# Patient Record
Sex: MALE | Race: WHITE | NOT HISPANIC OR LATINO | Employment: OTHER | URBAN - METROPOLITAN AREA
[De-identification: names, ages, dates, MRNs, and addresses within clinical notes are randomized per-mention and may not be internally consistent; named-entity substitution may affect disease eponyms.]

---

## 2023-02-24 LAB
ALANINE AMINOTRANSFERASE (SGPT) (U/L) IN SER/PLAS: 11 U/L (ref 10–52)
ALBUMIN (G/DL) IN SER/PLAS: 4.2 G/DL (ref 3.4–5)
ALKALINE PHOSPHATASE (U/L) IN SER/PLAS: 70 U/L (ref 33–136)
ANION GAP IN SER/PLAS: 10 MMOL/L (ref 10–20)
ASPARTATE AMINOTRANSFERASE (SGOT) (U/L) IN SER/PLAS: 14 U/L (ref 9–39)
BASOPHILS (10*3/UL) IN BLOOD BY AUTOMATED COUNT: 0.06 X10E9/L (ref 0–0.1)
BASOPHILS/100 LEUKOCYTES IN BLOOD BY AUTOMATED COUNT: 0.6 % (ref 0–2)
BILIRUBIN TOTAL (MG/DL) IN SER/PLAS: 0.7 MG/DL (ref 0–1.2)
CALCIUM (MG/DL) IN SER/PLAS: 9.6 MG/DL (ref 8.6–10.3)
CARBON DIOXIDE, TOTAL (MMOL/L) IN SER/PLAS: 30 MMOL/L (ref 21–32)
CHLORIDE (MMOL/L) IN SER/PLAS: 101 MMOL/L (ref 98–107)
CREATININE (MG/DL) IN SER/PLAS: 0.85 MG/DL (ref 0.5–1.3)
EOSINOPHILS (10*3/UL) IN BLOOD BY AUTOMATED COUNT: 0.27 X10E9/L (ref 0–0.4)
EOSINOPHILS/100 LEUKOCYTES IN BLOOD BY AUTOMATED COUNT: 2.6 % (ref 0–6)
ERYTHROCYTE DISTRIBUTION WIDTH (RATIO) BY AUTOMATED COUNT: 13.4 % (ref 11.5–14.5)
ERYTHROCYTE MEAN CORPUSCULAR HEMOGLOBIN CONCENTRATION (G/DL) BY AUTOMATED: 33.8 G/DL (ref 32–36)
ERYTHROCYTE MEAN CORPUSCULAR VOLUME (FL) BY AUTOMATED COUNT: 96 FL (ref 80–100)
ERYTHROCYTES (10*6/UL) IN BLOOD BY AUTOMATED COUNT: 4.79 X10E12/L (ref 4.5–5.9)
GFR MALE: >90 ML/MIN/1.73M2
GLUCOSE (MG/DL) IN SER/PLAS: 91 MG/DL (ref 74–99)
HEMATOCRIT (%) IN BLOOD BY AUTOMATED COUNT: 46.1 % (ref 41–52)
HEMOGLOBIN (G/DL) IN BLOOD: 15.6 G/DL (ref 13.5–17.5)
IMMATURE GRANULOCYTES/100 LEUKOCYTES IN BLOOD BY AUTOMATED COUNT: 0.2 % (ref 0–0.9)
LEUKOCYTES (10*3/UL) IN BLOOD BY AUTOMATED COUNT: 10.4 X10E9/L (ref 4.4–11.3)
LYMPHOCYTES (10*3/UL) IN BLOOD BY AUTOMATED COUNT: 4.11 X10E9/L (ref 0.8–3)
LYMPHOCYTES/100 LEUKOCYTES IN BLOOD BY AUTOMATED COUNT: 39.5 % (ref 13–44)
MONOCYTES (10*3/UL) IN BLOOD BY AUTOMATED COUNT: 1.04 X10E9/L (ref 0.05–0.8)
MONOCYTES/100 LEUKOCYTES IN BLOOD BY AUTOMATED COUNT: 10 % (ref 2–10)
NEUTROPHILS (10*3/UL) IN BLOOD BY AUTOMATED COUNT: 4.9 X10E9/L (ref 1.6–5.5)
NEUTROPHILS/100 LEUKOCYTES IN BLOOD BY AUTOMATED COUNT: 47.1 % (ref 40–80)
PLATELETS (10*3/UL) IN BLOOD AUTOMATED COUNT: 321 X10E9/L (ref 150–450)
POTASSIUM (MMOL/L) IN SER/PLAS: 5 MMOL/L (ref 3.5–5.3)
PROTEIN TOTAL: 7.1 G/DL (ref 6.4–8.2)
SODIUM (MMOL/L) IN SER/PLAS: 136 MMOL/L (ref 136–145)
UREA NITROGEN (MG/DL) IN SER/PLAS: 17 MG/DL (ref 6–23)

## 2023-03-01 ENCOUNTER — DOCUMENTATION (OUTPATIENT)
Dept: PRIMARY CARE | Facility: CLINIC | Age: 73
End: 2023-03-01
Payer: MEDICARE

## 2023-03-01 DIAGNOSIS — I10 HYPERTENSION, UNSPECIFIED TYPE: ICD-10-CM

## 2023-03-01 DIAGNOSIS — I25.10 CVD (CARDIOVASCULAR DISEASE): ICD-10-CM

## 2023-04-05 LAB
HEPATITIS C VIRUS AB PRESENCE IN SERUM: NONREACTIVE
HEPATITIS C VIRUS AB PRESENCE IN SERUM: NORMAL

## 2023-04-06 ENCOUNTER — TELEPHONE (OUTPATIENT)
Dept: PRIMARY CARE | Facility: CLINIC | Age: 73
End: 2023-04-06
Payer: MEDICARE

## 2023-04-11 PROBLEM — J32.9 CHRONIC SINUSITIS: Status: ACTIVE | Noted: 2023-04-11

## 2023-04-11 PROBLEM — R13.19 ESOPHAGEAL DYSPHAGIA: Status: ACTIVE | Noted: 2023-04-11

## 2023-04-11 PROBLEM — D72.820 PERSISTENT LYMPHOCYTOSIS: Status: ACTIVE | Noted: 2023-04-11

## 2023-04-11 PROBLEM — E78.5 HLD (HYPERLIPIDEMIA): Status: ACTIVE | Noted: 2023-04-11

## 2023-04-11 PROBLEM — M47.816 LUMBAR SPONDYLOSIS: Status: ACTIVE | Noted: 2023-04-11

## 2023-04-11 PROBLEM — M51.36 LUMBAR DEGENERATIVE DISC DISEASE: Status: ACTIVE | Noted: 2023-04-11

## 2023-04-11 PROBLEM — D72.829 LEUKOCYTOSIS: Status: ACTIVE | Noted: 2023-04-11

## 2023-04-11 PROBLEM — N52.9 MALE ERECTILE DISORDER OF ORGANIC ORIGIN: Status: ACTIVE | Noted: 2023-04-11

## 2023-04-11 PROBLEM — M17.32 POST-TRAUMATIC OSTEOARTHRITIS OF LEFT KNEE: Status: ACTIVE | Noted: 2023-04-11

## 2023-04-11 PROBLEM — M79.18 MYOFASCIAL PAIN SYNDROME: Status: ACTIVE | Noted: 2023-04-11

## 2023-04-11 PROBLEM — M19.90 ARTHRITIS: Status: ACTIVE | Noted: 2023-04-11

## 2023-04-11 PROBLEM — I25.10 CORONARY ARTERY DISEASE INVOLVING NATIVE CORONARY ARTERY OF NATIVE HEART WITHOUT ANGINA PECTORIS: Status: ACTIVE | Noted: 2023-04-11

## 2023-04-11 PROBLEM — N40.1 BENIGN PROSTATIC HYPERPLASIA WITH NOCTURIA: Status: ACTIVE | Noted: 2023-04-11

## 2023-04-11 PROBLEM — B37.0 THRUSH, ORAL: Status: RESOLVED | Noted: 2023-04-11 | Resolved: 2023-04-11

## 2023-04-11 PROBLEM — G57.63 MORTON'S NEUROMA OF BOTH FEET: Status: ACTIVE | Noted: 2023-04-11

## 2023-04-11 PROBLEM — B34.1 COXSACKIE VIRUSES: Status: ACTIVE | Noted: 2023-04-11

## 2023-04-11 PROBLEM — J02.9 SORE THROAT: Status: RESOLVED | Noted: 2023-04-11 | Resolved: 2023-04-11

## 2023-04-11 PROBLEM — M47.16 OSTEOARTHRITIS OF LUMBAR SPINE WITH MYELOPATHY: Status: ACTIVE | Noted: 2023-04-11

## 2023-04-11 PROBLEM — M96.1 CERVICAL POST-LAMINECTOMY SYNDROME: Status: ACTIVE | Noted: 2023-04-11

## 2023-04-11 PROBLEM — E04.2 NONTOXIC MULTINODULAR GOITER: Status: ACTIVE | Noted: 2023-04-11

## 2023-04-11 PROBLEM — M54.2 CERVICALGIA: Status: ACTIVE | Noted: 2023-04-11

## 2023-04-11 PROBLEM — G47.33 OBSTRUCTIVE SLEEP APNEA: Status: ACTIVE | Noted: 2023-04-11

## 2023-04-11 PROBLEM — M51.369 LUMBAR DEGENERATIVE DISC DISEASE: Status: ACTIVE | Noted: 2023-04-11

## 2023-04-11 PROBLEM — I10 HTN (HYPERTENSION): Status: ACTIVE | Noted: 2023-04-11

## 2023-04-11 PROBLEM — I51.9 HEART DISEASE: Status: ACTIVE | Noted: 2023-04-11

## 2023-04-11 PROBLEM — M51.16 DISPLACEMENT OF LUMBAR DISC WITH RADICULOPATHY: Status: ACTIVE | Noted: 2023-04-11

## 2023-04-11 PROBLEM — M47.812 SPONDYLOSIS OF CERVICAL REGION WITHOUT MYELOPATHY OR RADICULOPATHY: Status: ACTIVE | Noted: 2023-04-11

## 2023-04-11 PROBLEM — E66.9 OBESITY (BMI 30.0-34.9): Status: ACTIVE | Noted: 2023-04-11

## 2023-04-11 PROBLEM — R35.1 BENIGN PROSTATIC HYPERPLASIA WITH NOCTURIA: Status: ACTIVE | Noted: 2023-04-11

## 2023-04-11 PROBLEM — E66.811 OBESITY (BMI 30.0-34.9): Status: ACTIVE | Noted: 2023-04-11

## 2023-04-11 PROBLEM — L30.9 ECZEMA: Status: ACTIVE | Noted: 2023-04-11

## 2023-04-11 RX ORDER — FOLIC ACID 0.8 MG
1 TABLET ORAL DAILY
COMMUNITY
Start: 2019-12-26

## 2023-04-11 RX ORDER — OXYCODONE AND ACETAMINOPHEN 10; 325 MG/1; MG/1
TABLET ORAL EVERY 8 HOURS PRN
COMMUNITY
Start: 2022-11-16 | End: 2023-10-19 | Stop reason: SDUPTHER

## 2023-04-11 RX ORDER — ACETAMINOPHEN 500 MG
1000 TABLET ORAL EVERY 6 HOURS PRN
COMMUNITY

## 2023-04-11 RX ORDER — PRAVASTATIN SODIUM 10 MG/1
TABLET ORAL
COMMUNITY
End: 2023-09-08 | Stop reason: SDUPTHER

## 2023-04-11 RX ORDER — FUROSEMIDE 20 MG/1
TABLET ORAL
COMMUNITY
End: 2023-09-08 | Stop reason: SDUPTHER

## 2023-04-11 RX ORDER — UBIDECARENONE 75 MG
1 CAPSULE ORAL DAILY
COMMUNITY

## 2023-04-11 RX ORDER — QUINAPRIL 10 MG/1
TABLET ORAL
COMMUNITY
Start: 2013-12-24 | End: 2023-04-13 | Stop reason: ALTCHOICE

## 2023-04-11 RX ORDER — POTASSIUM CHLORIDE 750 MG/1
TABLET, EXTENDED RELEASE ORAL
COMMUNITY
End: 2023-04-13 | Stop reason: ALTCHOICE

## 2023-04-11 RX ORDER — CLOPIDOGREL BISULFATE 75 MG/1
75 TABLET ORAL DAILY
COMMUNITY
End: 2023-04-24

## 2023-04-11 RX ORDER — ATENOLOL 25 MG/1
25 TABLET ORAL DAILY
COMMUNITY
End: 2023-04-24

## 2023-04-11 RX ORDER — METHOCARBAMOL 750 MG/1
1 TABLET, FILM COATED ORAL
COMMUNITY
Start: 2023-04-02 | End: 2023-10-19 | Stop reason: SDUPTHER

## 2023-04-11 RX ORDER — SILDENAFIL 50 MG/1
TABLET, FILM COATED ORAL
COMMUNITY
Start: 2019-12-26 | End: 2023-04-13

## 2023-04-11 RX ORDER — LIDOCAINE HYDROCHLORIDE 20 MG/ML
SOLUTION OROPHARYNGEAL
COMMUNITY
Start: 2022-08-25 | End: 2023-04-13 | Stop reason: ALTCHOICE

## 2023-04-11 RX ORDER — PANTOPRAZOLE SODIUM 20 MG/1
1 TABLET, DELAYED RELEASE ORAL DAILY
COMMUNITY
Start: 2023-02-07 | End: 2023-10-19 | Stop reason: WASHOUT

## 2023-04-11 RX ORDER — CHOLECALCIFEROL (VITAMIN D3) 50 MCG
TABLET ORAL
COMMUNITY

## 2023-04-11 RX ORDER — CETIRIZINE HYDROCHLORIDE 10 MG/1
10 TABLET ORAL DAILY
COMMUNITY
Start: 2019-11-19

## 2023-04-11 RX ORDER — ESCITALOPRAM OXALATE 10 MG/1
10 TABLET ORAL DAILY
COMMUNITY
Start: 2023-02-17 | End: 2023-04-13 | Stop reason: ALTCHOICE

## 2023-04-11 RX ORDER — LISINOPRIL 10 MG/1
10 TABLET ORAL DAILY
COMMUNITY
Start: 2023-01-03 | End: 2023-07-07 | Stop reason: SDUPTHER

## 2023-04-11 RX ORDER — NALOXONE HYDROCHLORIDE 4 MG/.1ML
SPRAY NASAL
COMMUNITY
Start: 2019-10-04 | End: 2023-10-19 | Stop reason: SDUPTHER

## 2023-04-11 RX ORDER — PENICILLIN V POTASSIUM 500 MG/1
TABLET, FILM COATED ORAL
COMMUNITY
Start: 2023-03-08 | End: 2023-04-13 | Stop reason: ALTCHOICE

## 2023-04-11 RX ORDER — FAMOTIDINE 20 MG/1
20 TABLET, FILM COATED ORAL DAILY
COMMUNITY
Start: 2021-05-05

## 2023-04-11 RX ORDER — EZETIMIBE 10 MG/1
10 TABLET ORAL DAILY
COMMUNITY
End: 2023-04-24

## 2023-04-13 ENCOUNTER — OFFICE VISIT (OUTPATIENT)
Dept: PRIMARY CARE | Facility: CLINIC | Age: 73
End: 2023-04-13
Payer: MEDICARE

## 2023-04-13 VITALS
WEIGHT: 253 LBS | HEIGHT: 70 IN | DIASTOLIC BLOOD PRESSURE: 84 MMHG | SYSTOLIC BLOOD PRESSURE: 136 MMHG | OXYGEN SATURATION: 96 % | HEART RATE: 68 BPM | BODY MASS INDEX: 36.22 KG/M2

## 2023-04-13 DIAGNOSIS — R79.89 LOW TESTOSTERONE IN MALE: ICD-10-CM

## 2023-04-13 DIAGNOSIS — M47.16 OSTEOARTHRITIS OF LUMBAR SPINE WITH MYELOPATHY: ICD-10-CM

## 2023-04-13 DIAGNOSIS — M70.61 TROCHANTERIC BURSITIS OF RIGHT HIP: ICD-10-CM

## 2023-04-13 DIAGNOSIS — I10 PRIMARY HYPERTENSION: ICD-10-CM

## 2023-04-13 DIAGNOSIS — E66.01 OBESITY, MORBID (MULTI): Primary | ICD-10-CM

## 2023-04-13 PROCEDURE — 99214 OFFICE O/P EST MOD 30 MIN: CPT | Performed by: NURSE PRACTITIONER

## 2023-04-13 PROCEDURE — 3075F SYST BP GE 130 - 139MM HG: CPT | Performed by: NURSE PRACTITIONER

## 2023-04-13 PROCEDURE — 1159F MED LIST DOCD IN RCRD: CPT | Performed by: NURSE PRACTITIONER

## 2023-04-13 PROCEDURE — 1160F RVW MEDS BY RX/DR IN RCRD: CPT | Performed by: NURSE PRACTITIONER

## 2023-04-13 PROCEDURE — 1036F TOBACCO NON-USER: CPT | Performed by: NURSE PRACTITIONER

## 2023-04-13 PROCEDURE — 3079F DIAST BP 80-89 MM HG: CPT | Performed by: NURSE PRACTITIONER

## 2023-04-13 RX ORDER — MORPHINE SULFATE 15 MG/1
15 TABLET ORAL EVERY 4 HOURS PRN
COMMUNITY
End: 2023-10-19 | Stop reason: WASHOUT

## 2023-04-13 RX ORDER — MAGNESIUM 250 MG
250 TABLET ORAL 3 TIMES WEEKLY
COMMUNITY
End: 2023-08-14 | Stop reason: ALTCHOICE

## 2023-04-13 ASSESSMENT — ENCOUNTER SYMPTOMS
CARDIOVASCULAR NEGATIVE: 1
ARTHRALGIAS: 1
LOSS OF SENSATION IN FEET: 0
CONSTITUTIONAL NEGATIVE: 1
OCCASIONAL FEELINGS OF UNSTEADINESS: 0
NEUROLOGICAL NEGATIVE: 1
BACK PAIN: 1
GASTROINTESTINAL NEGATIVE: 1
JOINT SWELLING: 1
RESPIRATORY NEGATIVE: 1
DEPRESSION: 0

## 2023-04-13 ASSESSMENT — COLUMBIA-SUICIDE SEVERITY RATING SCALE - C-SSRS
1. IN THE PAST MONTH, HAVE YOU WISHED YOU WERE DEAD OR WISHED YOU COULD GO TO SLEEP AND NOT WAKE UP?: NO
6. HAVE YOU EVER DONE ANYTHING, STARTED TO DO ANYTHING, OR PREPARED TO DO ANYTHING TO END YOUR LIFE?: NO
2. HAVE YOU ACTUALLY HAD ANY THOUGHTS OF KILLING YOURSELF?: NO

## 2023-04-13 ASSESSMENT — PATIENT HEALTH QUESTIONNAIRE - PHQ9
SUM OF ALL RESPONSES TO PHQ9 QUESTIONS 1 AND 2: 0
2. FEELING DOWN, DEPRESSED OR HOPELESS: NOT AT ALL
1. LITTLE INTEREST OR PLEASURE IN DOING THINGS: NOT AT ALL

## 2023-04-13 NOTE — PATIENT INSTRUCTIONS
Bryson Sherwood MD  Endocrinologist in [place], [place]  Address: [id number], [place] [address]  Phone: [phone]    Consider Cymbalta if mood does not improve and help with pain    I printed out stretching exercises for you symptoms worsen with your hip and do not improve please consider following back up with orthopedic if needed   Wounds

## 2023-04-13 NOTE — PROGRESS NOTES
"Subjective   Patient ID: Phuc Cheek is a 72 y.o. male who presents for Follow-up (8 week ).    Pt  here to follow up on starting on Lexapro. He has stopped this medication 4 weeks into taking. He did not feel it helped any.  He is under pain management. He feels frustrated.  Would like to not start on any new medicatons.         Review of Systems   Constitutional: Negative.    Respiratory: Negative.     Cardiovascular: Negative.    Gastrointestinal: Negative.    Musculoskeletal:  Positive for arthralgias, back pain and joint swelling.   Neurological: Negative.        Objective   /84   Pulse 68   Ht 1.778 m (5' 10\")   Wt 115 kg (253 lb)   SpO2 96%   BMI 36.30 kg/m²     Physical Exam  Vitals reviewed.   Cardiovascular:      Rate and Rhythm: Normal rate.      Heart sounds: Normal heart sounds.   Pulmonary:      Effort: Pulmonary effort is normal.   Musculoskeletal:      Right upper leg: Tenderness present.      Right lower leg: Normal.      Comments: Tenderness to palpation at the right hip joint   Neurological:      Mental Status: He is alert.         Assessment/Plan   Problem List Items Addressed This Visit          Nervous    Osteoarthritis of lumbar spine with myelopathy     Continues to have pain in his right hip.  He does follow up with pain management            Circulatory    HTN (hypertension)     Blood pressure stable            Musculoskeletal    Trochanteric bursitis of right hip     We will print out stretching exercises patient to notify office if symptoms worsen do not improve continue to follow-up with orthopedic as needed and pain management            Endocrine/Metabolic    Obesity, morbid (CMS/HCC) - Primary     Other Visit Diagnoses       Low testosterone in male        Relevant Orders    Referral to Endocrinology               "

## 2023-04-23 DIAGNOSIS — I25.10 CORONARY ARTERY DISEASE INVOLVING NATIVE CORONARY ARTERY OF NATIVE HEART WITHOUT ANGINA PECTORIS: ICD-10-CM

## 2023-04-23 DIAGNOSIS — E78.5 HYPERLIPIDEMIA, UNSPECIFIED HYPERLIPIDEMIA TYPE: ICD-10-CM

## 2023-04-23 DIAGNOSIS — I10 PRIMARY HYPERTENSION: ICD-10-CM

## 2023-04-24 RX ORDER — CLOPIDOGREL BISULFATE 75 MG/1
TABLET ORAL
Qty: 90 TABLET | Refills: 0 | Status: SHIPPED | OUTPATIENT
Start: 2023-04-24 | End: 2023-07-17

## 2023-04-24 RX ORDER — ATENOLOL 25 MG/1
TABLET ORAL
Qty: 90 TABLET | Refills: 0 | Status: SHIPPED | OUTPATIENT
Start: 2023-04-24 | End: 2023-07-17

## 2023-04-24 RX ORDER — EZETIMIBE 10 MG/1
TABLET ORAL
Qty: 90 TABLET | Refills: 0 | Status: SHIPPED | OUTPATIENT
Start: 2023-04-24 | End: 2023-07-17

## 2023-04-25 ENCOUNTER — PATIENT OUTREACH (OUTPATIENT)
Dept: PRIMARY CARE | Facility: CLINIC | Age: 73
End: 2023-04-25
Payer: MEDICARE

## 2023-04-25 DIAGNOSIS — M47.16 OSTEOARTHRITIS OF LUMBAR SPINE WITH MYELOPATHY: ICD-10-CM

## 2023-04-25 DIAGNOSIS — I10 PRIMARY HYPERTENSION: ICD-10-CM

## 2023-04-25 DIAGNOSIS — E66.01 OBESITY, MORBID (MULTI): ICD-10-CM

## 2023-04-25 PROCEDURE — 99490 CHRNC CARE MGMT STAFF 1ST 20: CPT | Performed by: NURSE PRACTITIONER

## 2023-04-25 NOTE — PROGRESS NOTES
[facility] call to patient. He reports he is doing about the same. Continues with back pain and pain in joints. He went to see pain management Dr Enriquez on 4-5-23. No changes made. He also seen endocrinologist last Friday. Dr wants patient to have blood work done and then follow up with him again. Denies any new medications or changes. Patient stated he does have stretches to do but since he pulled a muscle in his groin, he hasn't really been able to do them. He reports blood pressure last week of 135/70.  He has appointment in June with sleep specialist and will further determine plan of care for sleep apnea at that time. Reports eating and drinking with no problems. He feels he is doing alright with anxiety and depression without taking the Lexapro. He does not want any different medication to try.  Denies any questions or concerns at time.

## 2023-05-16 ENCOUNTER — PATIENT OUTREACH (OUTPATIENT)
Dept: PRIMARY CARE | Facility: CLINIC | Age: 73
End: 2023-05-16
Payer: MEDICARE

## 2023-05-16 DIAGNOSIS — I10 PRIMARY HYPERTENSION: ICD-10-CM

## 2023-05-16 DIAGNOSIS — M47.16 OSTEOARTHRITIS OF LUMBAR SPINE WITH MYELOPATHY: ICD-10-CM

## 2023-05-16 DIAGNOSIS — I25.10 CVD (CARDIOVASCULAR DISEASE): ICD-10-CM

## 2023-05-16 NOTE — PROGRESS NOTES
No new appointments noted in [facility]. Dr Enriquez seen April 5,2023.  Call noted to patient from endocrinology. Will try at a later date to contact patient.

## 2023-05-23 ENCOUNTER — PATIENT OUTREACH (OUTPATIENT)
Dept: PRIMARY CARE | Facility: CLINIC | Age: 73
End: 2023-05-23
Payer: MEDICARE

## 2023-05-23 DIAGNOSIS — M47.16 OSTEOARTHRITIS OF LUMBAR SPINE WITH MYELOPATHY: ICD-10-CM

## 2023-05-23 DIAGNOSIS — I25.10 CVD (CARDIOVASCULAR DISEASE): ICD-10-CM

## 2023-05-23 DIAGNOSIS — I10 PRIMARY HYPERTENSION: ICD-10-CM

## 2023-05-23 PROCEDURE — 99490 CHRNC CARE MGMT STAFF 1ST 20: CPT | Performed by: NURSE PRACTITIONER

## 2023-05-23 NOTE — PROGRESS NOTES
[facility] call to patient. He reports he is not having a very good day. Reports pain in his back, hip, feet and knees and today received a call from dermatology that he has basal cell carcinoma on his nose and will have to have it removed. He reports he is scheduled to have moles procedure done on June 7. He is seeing foot Dr this Thursday 5-25-23 for his foot pain.   He did see Dr Clayton in March for the knee pain at which time the Dr did x-rays and said his knees were alright and the knee pain eventually went away on its own and now comes and goes. He has not had x-rays of his back recently. He did have injection for back pain in December that he reports really lasted about 1 day. Pain management Dr Lorraine Enriquez has retired and is now gone from . He reports he has a new pain management Dr that he is scheduled to see at the end of June. He currently is taking his morphine at night and percocet 3 x daily. He has exercises and stretches that were provided to him, however, he has not been able to do them due to pain. Instructed on need to at least walk for short distances and move around frequently to avoid further joint issues. He reports he is still doing as much as possible and trying to work through the pain. Instructed if pain becomes too bad before he sees his new pain management Dr, then to call as NP may be able to assist. He states he has not been monitoring his blood pressure. Instructed on blood pressure medications he is taking and need to monitor his blood pressure to assure they are affective with keeping blood pressure under control. Instructed on pain often times increases blood pressure as well. Patient agreeable to monitoring blood pressure. Patient reports he is eating good, taking all medications as ordered and no issues with urination or bowel movements.  Patient will call if any questions or concerns. Agreeable to follow up call after being seen by foot Dr.

## 2023-06-01 ENCOUNTER — PATIENT OUTREACH (OUTPATIENT)
Dept: PRIMARY CARE | Facility: CLINIC | Age: 73
End: 2023-06-01
Payer: MEDICARE

## 2023-06-01 DIAGNOSIS — I25.10 CVD (CARDIOVASCULAR DISEASE): ICD-10-CM

## 2023-06-01 DIAGNOSIS — M47.16 OSTEOARTHRITIS OF LUMBAR SPINE WITH MYELOPATHY: ICD-10-CM

## 2023-06-01 DIAGNOSIS — I10 PRIMARY HYPERTENSION: ICD-10-CM

## 2023-06-01 PROCEDURE — 99490 CHRNC CARE MGMT STAFF 1ST 20: CPT | Performed by: NURSE PRACTITIONER

## 2023-06-01 NOTE — PROGRESS NOTES
[facility] call to patient. He did see foot Dr . She provided him with a boot for his right foot. She also gave him pads for inside both shoes. Initially dr thought he had a stress fracture of the right foot but then noticed left foot was the same. He started prednisone dose back. He does feel the dose pack and pads have helped some. He is icing his foot 2 x daily as she instructed him to do and elevating it. He has appointment to return to her next week. He reports his blood pressure was 130/77. He continues with chronic back pain and taking his pain medications. He has appointment with new pain management  on 6-29-23. He reports taking all medications as ordered. Denies need for refills at this time. Instructed to continue to monitor blood pressure and call with any questions or concerns. Instructed pads in shoe may help with his back pain as sometimes stepping the wrong way can cause back pain. He will see what she says at next week's appointment. No concerns at this time. Agreeable to follow up call in 3-4 weeks.

## 2023-07-07 ENCOUNTER — PATIENT OUTREACH (OUTPATIENT)
Dept: PRIMARY CARE | Facility: CLINIC | Age: 73
End: 2023-07-07
Payer: MEDICARE

## 2023-07-07 DIAGNOSIS — I10 PRIMARY HYPERTENSION: ICD-10-CM

## 2023-07-07 DIAGNOSIS — I10 HYPERTENSION, UNSPECIFIED TYPE: ICD-10-CM

## 2023-07-07 DIAGNOSIS — I25.10 CVD (CARDIOVASCULAR DISEASE): ICD-10-CM

## 2023-07-07 DIAGNOSIS — M47.16 OSTEOARTHRITIS OF LUMBAR SPINE WITH MYELOPATHY: ICD-10-CM

## 2023-07-07 PROCEDURE — 99490 CHRNC CARE MGMT STAFF 1ST 20: CPT | Performed by: NURSE PRACTITIONER

## 2023-07-07 NOTE — PROGRESS NOTES
[facility] call to patient. He had MRI done of his right foot. It showed   Mild midfoot arthrosis.  Likely partial plantar plate tear 2nd MTP.  Evidence of stress fracture or stress-related injury.  Patient received injection in foot. He stated it was feeling better until he was walking outside yesterday and stepped on a stone where the tear is and started hurting all over again. He continues to be wearing the pads in his shoes. He continues to be using ice for pain management. He continues also with chronic back pain that he is using pain medication for. He reports the following blood pressures and pulses  6-21  7:11 pm      149/74     67           10:45 pm     152/74     66            11:25pm      147/68  6/24                      168/86  6/30                       158/85        66  7/5                         130/80       55  7/6                         128/71           69                                 124/73   He also was in need of refill. Refill request sent to NP. He denies any increased salt intake. He denies increased pain at the time of higher readings. Instructed to continue to monitor and I will send readings to NP and if she wants any further orders or changes I will call him back next week when NP returns. Patient agreeable.  He has appointment with new pain management  next month.  Appointment with NP 8-14-23.

## 2023-07-10 RX ORDER — LISINOPRIL 10 MG/1
10 TABLET ORAL DAILY
Qty: 90 TABLET | Refills: 1 | Status: SHIPPED | OUTPATIENT
Start: 2023-07-10 | End: 2023-10-05 | Stop reason: SDUPTHER

## 2023-07-14 DIAGNOSIS — E78.5 HYPERLIPIDEMIA, UNSPECIFIED HYPERLIPIDEMIA TYPE: ICD-10-CM

## 2023-07-14 DIAGNOSIS — I10 PRIMARY HYPERTENSION: ICD-10-CM

## 2023-07-14 DIAGNOSIS — I25.10 CORONARY ARTERY DISEASE INVOLVING NATIVE CORONARY ARTERY OF NATIVE HEART WITHOUT ANGINA PECTORIS: ICD-10-CM

## 2023-07-14 PROBLEM — I11.9 HYPERTENSIVE HEART DISEASE: Status: ACTIVE | Noted: 2022-07-30

## 2023-07-14 RX ORDER — METHOCARBAMOL 500 MG/1
500 TABLET, FILM COATED ORAL 3 TIMES DAILY PRN
COMMUNITY
Start: 2023-05-22 | End: 2023-08-14 | Stop reason: ALTCHOICE

## 2023-07-14 RX ORDER — POTASSIUM CHLORIDE 750 MG/1
TABLET, EXTENDED RELEASE ORAL
COMMUNITY
Start: 2023-05-03 | End: 2023-09-08 | Stop reason: SDUPTHER

## 2023-07-14 RX ORDER — DIAZEPAM 5 MG/1
TABLET ORAL
COMMUNITY
Start: 2022-01-12 | End: 2023-11-27 | Stop reason: ALTCHOICE

## 2023-07-17 RX ORDER — EZETIMIBE 10 MG/1
TABLET ORAL
Qty: 90 TABLET | Refills: 0 | Status: SHIPPED | OUTPATIENT
Start: 2023-07-17 | End: 2023-10-05 | Stop reason: SDUPTHER

## 2023-07-17 RX ORDER — ATENOLOL 25 MG/1
TABLET ORAL
Qty: 90 TABLET | Refills: 0 | Status: SHIPPED | OUTPATIENT
Start: 2023-07-17 | End: 2023-10-05 | Stop reason: SDUPTHER

## 2023-07-17 RX ORDER — CLOPIDOGREL BISULFATE 75 MG/1
TABLET ORAL
Qty: 90 TABLET | Refills: 0 | Status: SHIPPED | OUTPATIENT
Start: 2023-07-17 | End: 2023-10-05 | Stop reason: SDUPTHER

## 2023-08-07 ENCOUNTER — PATIENT OUTREACH (OUTPATIENT)
Dept: PRIMARY CARE | Facility: CLINIC | Age: 73
End: 2023-08-07
Payer: MEDICARE

## 2023-08-07 DIAGNOSIS — M47.16 OSTEOARTHRITIS OF LUMBAR SPINE WITH MYELOPATHY: ICD-10-CM

## 2023-08-07 DIAGNOSIS — I10 PRIMARY HYPERTENSION: ICD-10-CM

## 2023-08-07 DIAGNOSIS — I25.10 CVD (CARDIOVASCULAR DISEASE): ICD-10-CM

## 2023-08-07 LAB
ALANINE AMINOTRANSFERASE (SGPT) (U/L) IN SER/PLAS: 15 U/L (ref 10–52)
ALBUMIN (G/DL) IN SER/PLAS: 4 G/DL (ref 3.4–5)
ALKALINE PHOSPHATASE (U/L) IN SER/PLAS: 67 U/L (ref 33–136)
ANION GAP IN SER/PLAS: 13 MMOL/L (ref 10–20)
ASPARTATE AMINOTRANSFERASE (SGOT) (U/L) IN SER/PLAS: 18 U/L (ref 9–39)
BASOPHILS (10*3/UL) IN BLOOD BY AUTOMATED COUNT: 0.09 X10E9/L (ref 0–0.1)
BASOPHILS/100 LEUKOCYTES IN BLOOD BY AUTOMATED COUNT: 1 % (ref 0–2)
BILIRUBIN TOTAL (MG/DL) IN SER/PLAS: 0.6 MG/DL (ref 0–1.2)
CALCIUM (MG/DL) IN SER/PLAS: 9 MG/DL (ref 8.6–10.3)
CARBON DIOXIDE, TOTAL (MMOL/L) IN SER/PLAS: 24 MMOL/L (ref 21–32)
CHLORIDE (MMOL/L) IN SER/PLAS: 104 MMOL/L (ref 98–107)
CHOLESTEROL (MG/DL) IN SER/PLAS: 132 MG/DL (ref 0–199)
CHOLESTEROL IN HDL (MG/DL) IN SER/PLAS: 45.6 MG/DL
CHOLESTEROL/HDL RATIO: 2.9
CREATININE (MG/DL) IN SER/PLAS: 0.84 MG/DL (ref 0.5–1.3)
EOSINOPHILS (10*3/UL) IN BLOOD BY AUTOMATED COUNT: 0.84 X10E9/L (ref 0–0.4)
EOSINOPHILS/100 LEUKOCYTES IN BLOOD BY AUTOMATED COUNT: 8.9 % (ref 0–6)
ERYTHROCYTE DISTRIBUTION WIDTH (RATIO) BY AUTOMATED COUNT: 13.2 % (ref 11.5–14.5)
ERYTHROCYTE MEAN CORPUSCULAR HEMOGLOBIN CONCENTRATION (G/DL) BY AUTOMATED: 33.3 G/DL (ref 32–36)
ERYTHROCYTE MEAN CORPUSCULAR VOLUME (FL) BY AUTOMATED COUNT: 96 FL (ref 80–100)
ERYTHROCYTES (10*6/UL) IN BLOOD BY AUTOMATED COUNT: 4.77 X10E12/L (ref 4.5–5.9)
GFR MALE: >90 ML/MIN/1.73M2
GLUCOSE (MG/DL) IN SER/PLAS: 83 MG/DL (ref 74–99)
HEMATOCRIT (%) IN BLOOD BY AUTOMATED COUNT: 46 % (ref 41–52)
HEMOGLOBIN (G/DL) IN BLOOD: 15.3 G/DL (ref 13.5–17.5)
IMMATURE GRANULOCYTES/100 LEUKOCYTES IN BLOOD BY AUTOMATED COUNT: 0.1 % (ref 0–0.9)
LDL: 62 MG/DL (ref 0–99)
LEUKOCYTES (10*3/UL) IN BLOOD BY AUTOMATED COUNT: 9.4 X10E9/L (ref 4.4–11.3)
LYMPHOCYTES (10*3/UL) IN BLOOD BY AUTOMATED COUNT: 4.54 X10E9/L (ref 0.8–3)
LYMPHOCYTES/100 LEUKOCYTES IN BLOOD BY AUTOMATED COUNT: 48.3 % (ref 13–44)
MONOCYTES (10*3/UL) IN BLOOD BY AUTOMATED COUNT: 1.2 X10E9/L (ref 0.05–0.8)
MONOCYTES/100 LEUKOCYTES IN BLOOD BY AUTOMATED COUNT: 12.8 % (ref 2–10)
NEUTROPHILS (10*3/UL) IN BLOOD BY AUTOMATED COUNT: 2.71 X10E9/L (ref 1.6–5.5)
NEUTROPHILS/100 LEUKOCYTES IN BLOOD BY AUTOMATED COUNT: 28.9 % (ref 40–80)
PLATELETS (10*3/UL) IN BLOOD AUTOMATED COUNT: 318 X10E9/L (ref 150–450)
POTASSIUM (MMOL/L) IN SER/PLAS: 4 MMOL/L (ref 3.5–5.3)
PROTEIN TOTAL: 6.6 G/DL (ref 6.4–8.2)
SODIUM (MMOL/L) IN SER/PLAS: 137 MMOL/L (ref 136–145)
THYROTROPIN (MIU/L) IN SER/PLAS BY DETECTION LIMIT <= 0.05 MIU/L: 0.29 MIU/L (ref 0.44–3.98)
THYROXINE (T4) FREE (NG/DL) IN SER/PLAS: 0.95 NG/DL (ref 0.61–1.12)
TRIGLYCERIDE (MG/DL) IN SER/PLAS: 122 MG/DL (ref 0–149)
UREA NITROGEN (MG/DL) IN SER/PLAS: 16 MG/DL (ref 6–23)
VLDL: 24 MG/DL (ref 0–40)

## 2023-08-07 NOTE — PROGRESS NOTES
Message left on answering machine.  Chart reviewed.  Patient seen new pain management Dr on 7-25-23.

## 2023-08-08 LAB
COBALAMIN (VITAMIN B12) (PG/ML) IN SER/PLAS: 662 PG/ML (ref 211–911)
ESTIMATED AVERAGE GLUCOSE FOR HBA1C: 108 MG/DL
HEMOGLOBIN A1C/HEMOGLOBIN TOTAL IN BLOOD: 5.4 %
PROSTATE SPECIFIC ANTIGEN,SCREEN: 0.79 NG/ML (ref 0–4)

## 2023-08-11 LAB
TESTOSTERONE FREE (CHAN): 42.4 PG/ML (ref 30–135)
TESTOSTERONE,TOTAL,LC-MS/MS: 420 NG/DL (ref 250–1100)

## 2023-08-14 ENCOUNTER — OFFICE VISIT (OUTPATIENT)
Dept: PRIMARY CARE | Facility: CLINIC | Age: 73
End: 2023-08-14
Payer: MEDICARE

## 2023-08-14 VITALS
BODY MASS INDEX: 36.82 KG/M2 | WEIGHT: 248.6 LBS | OXYGEN SATURATION: 96 % | DIASTOLIC BLOOD PRESSURE: 80 MMHG | SYSTOLIC BLOOD PRESSURE: 128 MMHG | HEIGHT: 69 IN | HEART RATE: 53 BPM

## 2023-08-14 DIAGNOSIS — I25.10 CORONARY ARTERY DISEASE INVOLVING NATIVE CORONARY ARTERY OF NATIVE HEART WITHOUT ANGINA PECTORIS: ICD-10-CM

## 2023-08-14 DIAGNOSIS — E66.01 CLASS 2 SEVERE OBESITY DUE TO EXCESS CALORIES WITH SERIOUS COMORBIDITY AND BODY MASS INDEX (BMI) OF 36.0 TO 36.9 IN ADULT (MULTI): ICD-10-CM

## 2023-08-14 DIAGNOSIS — I10 PRIMARY HYPERTENSION: ICD-10-CM

## 2023-08-14 DIAGNOSIS — G47.33 OBSTRUCTIVE SLEEP APNEA: ICD-10-CM

## 2023-08-14 DIAGNOSIS — Z00.00 ROUTINE GENERAL MEDICAL EXAMINATION AT HEALTH CARE FACILITY: Primary | ICD-10-CM

## 2023-08-14 PROBLEM — E78.5 HLD (HYPERLIPIDEMIA): Status: RESOLVED | Noted: 2023-04-11 | Resolved: 2023-08-14

## 2023-08-14 PROBLEM — R35.1 BENIGN PROSTATIC HYPERPLASIA WITH NOCTURIA: Status: RESOLVED | Noted: 2023-04-11 | Resolved: 2023-08-14

## 2023-08-14 PROBLEM — N40.1 BENIGN PROSTATIC HYPERPLASIA WITH NOCTURIA: Status: RESOLVED | Noted: 2023-04-11 | Resolved: 2023-08-14

## 2023-08-14 PROBLEM — L30.9 ECZEMA: Status: RESOLVED | Noted: 2023-04-11 | Resolved: 2023-08-14

## 2023-08-14 PROCEDURE — 1170F FXNL STATUS ASSESSED: CPT | Performed by: NURSE PRACTITIONER

## 2023-08-14 PROCEDURE — 3074F SYST BP LT 130 MM HG: CPT | Performed by: NURSE PRACTITIONER

## 2023-08-14 PROCEDURE — 1160F RVW MEDS BY RX/DR IN RCRD: CPT | Performed by: NURSE PRACTITIONER

## 2023-08-14 PROCEDURE — 1159F MED LIST DOCD IN RCRD: CPT | Performed by: NURSE PRACTITIONER

## 2023-08-14 PROCEDURE — 1036F TOBACCO NON-USER: CPT | Performed by: NURSE PRACTITIONER

## 2023-08-14 PROCEDURE — 3079F DIAST BP 80-89 MM HG: CPT | Performed by: NURSE PRACTITIONER

## 2023-08-14 PROCEDURE — 1125F AMNT PAIN NOTED PAIN PRSNT: CPT | Performed by: NURSE PRACTITIONER

## 2023-08-14 PROCEDURE — G0439 PPPS, SUBSEQ VISIT: HCPCS | Performed by: NURSE PRACTITIONER

## 2023-08-14 RX ORDER — ESZOPICLONE 2 MG/1
2 TABLET, FILM COATED ORAL NIGHTLY
COMMUNITY
Start: 2023-07-17 | End: 2023-10-05 | Stop reason: ALTCHOICE

## 2023-08-14 ASSESSMENT — ACTIVITIES OF DAILY LIVING (ADL)
BATHING: INDEPENDENT
TAKING_MEDICATION: INDEPENDENT
DOING_HOUSEWORK: INDEPENDENT
DRESSING: INDEPENDENT
MANAGING_FINANCES: INDEPENDENT
GROCERY_SHOPPING: INDEPENDENT

## 2023-08-14 ASSESSMENT — PATIENT HEALTH QUESTIONNAIRE - PHQ9
SUM OF ALL RESPONSES TO PHQ9 QUESTIONS 1 AND 2: 1
1. LITTLE INTEREST OR PLEASURE IN DOING THINGS: NOT AT ALL
10. IF YOU CHECKED OFF ANY PROBLEMS, HOW DIFFICULT HAVE THESE PROBLEMS MADE IT FOR YOU TO DO YOUR WORK, TAKE CARE OF THINGS AT HOME, OR GET ALONG WITH OTHER PEOPLE: NOT DIFFICULT AT ALL
2. FEELING DOWN, DEPRESSED OR HOPELESS: SEVERAL DAYS

## 2023-08-14 ASSESSMENT — COLUMBIA-SUICIDE SEVERITY RATING SCALE - C-SSRS
2. HAVE YOU ACTUALLY HAD ANY THOUGHTS OF KILLING YOURSELF?: NO
6. HAVE YOU EVER DONE ANYTHING, STARTED TO DO ANYTHING, OR PREPARED TO DO ANYTHING TO END YOUR LIFE?: NO
1. IN THE PAST MONTH, HAVE YOU WISHED YOU WERE DEAD OR WISHED YOU COULD GO TO SLEEP AND NOT WAKE UP?: NO

## 2023-08-14 ASSESSMENT — ENCOUNTER SYMPTOMS
LOSS OF SENSATION IN FEET: 0
CONSTITUTIONAL NEGATIVE: 1
DEPRESSION: 0
OCCASIONAL FEELINGS OF UNSTEADINESS: 0
RESPIRATORY NEGATIVE: 1
PSYCHIATRIC NEGATIVE: 1
CARDIOVASCULAR NEGATIVE: 1
GASTROINTESTINAL NEGATIVE: 1
BACK PAIN: 1

## 2023-08-14 NOTE — PROGRESS NOTES
"Subjective   Reason for Visit: Phuc Cheek is an 73 y.o. male here for a Medicare Wellness visit.     Past Medical, Surgical, and Family History reviewed and updated in chart.    Reviewed all medications by prescribing practitioner or clinical pharmacist (such as prescriptions, OTCs, herbal therapies and supplements) and documented in the medical record.    Patient presents today for his routine Medicare wellness.  Patient frustrated as he has a list of diagnoses that have been associated with his name or his chart at 1 point in time.  Discussed with patient we would review.  Reviewed his current blood work        Patient Care Team:  JIM Juares as PCP - General  JIM Juares as P[facility] - [facility]   Dr. Osman cardiology  Eduin Giang: [facility] neurology  Dr. Enriquez (needs new provider): pain management  [facility] Endo: testoterone  Urology: Dr. Monroy   Review of Systems   Constitutional: Negative.    Respiratory: Negative.     Cardiovascular: Negative.    Gastrointestinal: Negative.    Musculoskeletal:  Positive for back pain.   Skin: Negative.    Psychiatric/Behavioral: Negative.         Objective   Vitals:  /80   Pulse 53   Ht 1.76 m (5' 9.3\")   Wt 113 kg (248 lb 9.6 oz)   SpO2 96%   BMI 36.39 kg/m²       Physical Exam  Constitutional:       Appearance: He is obese.   Eyes:      Conjunctiva/sclera: Conjunctivae normal.      Pupils: Pupils are equal, round, and reactive to light.   Cardiovascular:      Rate and Rhythm: Normal rate.      Heart sounds: Normal heart sounds.   Pulmonary:      Effort: Pulmonary effort is normal.      Breath sounds: Normal breath sounds.   Abdominal:      General: Bowel sounds are normal.      Palpations: Abdomen is soft.   Skin:     Capillary Refill: Capillary refill takes less than 2 seconds.   Neurological:      General: No focal deficit present.      Mental Status: He is alert and oriented to " person, place, and time.   Psychiatric:         Mood and Affect: Mood normal.         Behavior: Behavior normal.         Thought Content: Thought content normal.         Judgment: Judgment normal.         Assessment/Plan   Problem List Items Addressed This Visit       Coronary artery disease involving native coronary artery of native heart without angina pectoris    Current Assessment & Plan     Has upcoming appointment in November his cardiologist Dr Osman         HTN (hypertension)    Current Assessment & Plan     Blood pressure stable         Obstructive sleep apnea    Current Assessment & Plan     Scheduled for sleep study next month          Other Visit Diagnoses       Routine general medical examination at health care facility    -  Primary    Class 2 severe obesity due to excess calories with serious comorbidity and body mass index (BMI) of 36.0 to 36.9 in adult (CMS/[facility])              Reviewed recent blood work at length with patient.  Discussed importance of routine follow-up with a specialist.  Also discussed at length diagnoses that have been on his chart previously.  Extended time spent with patient.     Patient to follow-up in 6 months sooner if needed

## 2023-08-14 NOTE — PATIENT INSTRUCTIONS
"As we reviewed today.  I am unsure how some of those things did get on your list.  I am glad we were able to clarify them.  Please continue to follow-up with specialist    Follow-up every 6 months and as needed    I want to emphasize the importance of regular exercise for maintaining and improving your overall health and well-being.  If you only engage in activity for 15 minutes a day, engaging in physical activity is one of the most beneficial things you can do for your body and mind. Here are some key reasons why exercise is crucial for your health:    1. Cardiovascular Health:    Exercise strengthens your heart and improves circulation, reducing the risk of heart disease, high blood pressure, and stroke.  Regular aerobic exercise, like walking, jogging, or cycling, helps keep your cardiovascular system in excellent condition.  2. Weight Management:    Regular physical activity plays a vital role in managing and maintaining a healthy weight.  Combining exercise with a balanced diet can help you achieve and sustain a healthy body weight.  3. Muscle Strength and Flexibility:    Exercise helps build and maintain strong muscles, which support and protect your bones and joints.  Stretching exercises improve flexibility, reducing the risk of injuries and promoting better posture.  4. Bone Health:    Weight-bearing exercises, such as walking, dancing, or weightlifting, stimulate bone growth and help prevent bone loss as you age.  Strong bones reduce the risk of osteoporosis and fractures.  5. Mental Health and Mood:    Physical activity triggers the release of endorphins, the \"feel-good\" hormones, which can help reduce stress, anxiety, and depression.  Regular exercise is associated with improved mood, better sleep, and enhanced cognitive function.  6. Energy and Endurance:    Exercise boosts your energy levels and increases your stamina, making daily activities easier to accomplish.  Improved endurance allows you to " Bedside shift change report given to [address] [place] [address] (oncoming nurse) by Gelacio Patrick RN (offgoing nurse). Report included the following information SBAR, Kardex, ED Summary, Procedure Summary, Intake/Output, MAR, Accordion, Recent Results and Cardiac Rhythm NSR. engage in activities for more extended periods without feeling fatigued.  7. Chronic Disease Prevention:    Regular exercise can help prevent or manage various chronic conditions, such as type 2 diabetes, certain cancers, and metabolic syndrome.  8. Immune System Support:    Exercise can enhance the immune system, making it more effective in defending against illnesses and infections.  9. Social Interaction:    Participating in group exercise classes or outdoor activities provides opportunities for social interaction, reducing feelings of isolation and loneliness.  10. Longevity and Quality of Life:    Studies show that individuals who maintain an active lifestyle tend to live longer and enjoy a higher quality of life.

## 2023-08-14 NOTE — PROGRESS NOTES
"Subjective   Patient ID: Phuc Cheek is a 73 y.o. male who presents for Medicare Annual Wellness Visit Initial.    HPI     Review of Systems    Objective   /80   Pulse 53   Ht 1.76 m (5' 9.3\")   Wt 113 kg (248 lb 9.6 oz)   SpO2 96%   BMI 36.39 kg/m²     Physical Exam    Assessment/Plan   Problem List Items Addressed This Visit    None         "

## 2023-08-28 ENCOUNTER — PATIENT OUTREACH (OUTPATIENT)
Dept: PRIMARY CARE | Facility: CLINIC | Age: 73
End: 2023-08-28
Payer: MEDICARE

## 2023-08-28 DIAGNOSIS — I10 HYPERTENSION, UNSPECIFIED TYPE: ICD-10-CM

## 2023-08-28 DIAGNOSIS — G47.33 OBSTRUCTIVE SLEEP APNEA: ICD-10-CM

## 2023-08-28 DIAGNOSIS — M47.16 OSTEOARTHRITIS OF LUMBAR SPINE WITH MYELOPATHY: ICD-10-CM

## 2023-08-28 DIAGNOSIS — E66.01 OBESITY, MORBID (MULTI): ICD-10-CM

## 2023-08-28 PROCEDURE — 99490 CHRNC CARE MGMT STAFF 1ST 20: CPT | Performed by: NURSE PRACTITIONER

## 2023-08-28 NOTE — PROGRESS NOTES
[facility] call to patient. He had MWV with NP on 8-14-23. He reports all went well. He currently is experiencing pain in his left shoulder blade that goes down about 6 inches. He thinks+ he has a pinched nerve. In the past he had several bad discs in his neck and thinks this pain is the same type of pain. He follows with Dr Chaves. Instructed to call Dr Chaves and make appointment and make aware of what is happening. Instructed use of ice and heat. He has been using them but as soon as the ice and heat are removed, it starts to hurt again.  He currently takes morphine and percocet for pain. He also has tried massage that helps for a short time. He is eating well. Denies any issues with urination. He is trying to walk more as much as pain allows him to. Foot pain is slowly getting better.Instructed on pain management and to call Dr Chaves's office and if the pain becomes too bad while waiting to get in to see Dr Chaves to call. Instructed on NP's instructions of importance of getting exercise. He verbalized understanding. He is scheduled for sleep study on 9-7-23. He reports blood pressures averaging 120-138/ 70-78.

## 2023-08-31 PROBLEM — R73.09 ELEVATED GLUCOSE LEVEL: Status: ACTIVE | Noted: 2023-08-31

## 2023-08-31 PROBLEM — K64.8 BLEEDING INTERNAL HEMORRHOIDS: Status: ACTIVE | Noted: 2023-08-31

## 2023-08-31 PROBLEM — I50.9 CONGESTIVE HEART FAILURE (MULTI): Status: ACTIVE | Noted: 2022-07-30

## 2023-08-31 PROBLEM — R45.89 SYMPTOMS OF DEPRESSION: Status: ACTIVE | Noted: 2023-08-31

## 2023-08-31 PROBLEM — M47.816 LUMBAR FACET ARTHROPATHY: Status: ACTIVE | Noted: 2023-08-31

## 2023-08-31 PROBLEM — R53.83 FATIGUE: Status: ACTIVE | Noted: 2023-08-31

## 2023-08-31 PROBLEM — Q55.20 SCROTAL ANOMALY: Status: ACTIVE | Noted: 2023-08-31

## 2023-08-31 RX ORDER — IBUPROFEN 800 MG/1
800 TABLET ORAL EVERY 6 HOURS PRN
COMMUNITY
End: 2023-10-05 | Stop reason: ALTCHOICE

## 2023-08-31 RX ORDER — SILDENAFIL 50 MG/1
50 TABLET, FILM COATED ORAL DAILY PRN
COMMUNITY
End: 2023-10-05 | Stop reason: ALTCHOICE

## 2023-08-31 RX ORDER — ESCITALOPRAM OXALATE 10 MG/1
10 TABLET ORAL DAILY
COMMUNITY
End: 2023-10-19 | Stop reason: WASHOUT

## 2023-08-31 RX ORDER — ASPIRIN 81 MG/1
81 TABLET ORAL DAILY
COMMUNITY
End: 2023-10-05 | Stop reason: ALTCHOICE

## 2023-09-08 DIAGNOSIS — I10 HYPERTENSION, UNSPECIFIED TYPE: Primary | ICD-10-CM

## 2023-09-08 DIAGNOSIS — I25.10 CVD (CARDIOVASCULAR DISEASE): ICD-10-CM

## 2023-09-08 RX ORDER — FUROSEMIDE 20 MG/1
TABLET ORAL
Qty: 39 TABLET | Refills: 0 | Status: SHIPPED | OUTPATIENT
Start: 2023-09-08 | End: 2023-12-01

## 2023-09-08 RX ORDER — POTASSIUM CHLORIDE 750 MG/1
TABLET, FILM COATED, EXTENDED RELEASE ORAL
Qty: 36 TABLET | Refills: 0 | Status: SHIPPED | OUTPATIENT
Start: 2023-09-08 | End: 2023-09-12 | Stop reason: SDUPTHER

## 2023-09-08 RX ORDER — PRAVASTATIN SODIUM 10 MG/1
TABLET ORAL
Qty: 39 TABLET | Refills: 0 | Status: SHIPPED | OUTPATIENT
Start: 2023-09-08 | End: 2024-01-22 | Stop reason: SDUPTHER

## 2023-09-11 DIAGNOSIS — I25.10 CVD (CARDIOVASCULAR DISEASE): ICD-10-CM

## 2023-09-11 DIAGNOSIS — I10 HYPERTENSION, UNSPECIFIED TYPE: ICD-10-CM

## 2023-09-11 RX ORDER — POTASSIUM CHLORIDE 750 MG/1
TABLET, FILM COATED, EXTENDED RELEASE ORAL
Qty: 36 TABLET | Refills: 0 | Status: CANCELLED | OUTPATIENT
Start: 2023-09-11

## 2023-09-12 DIAGNOSIS — I10 HYPERTENSION, UNSPECIFIED TYPE: ICD-10-CM

## 2023-09-12 DIAGNOSIS — I25.10 CVD (CARDIOVASCULAR DISEASE): ICD-10-CM

## 2023-09-12 RX ORDER — POTASSIUM CHLORIDE 750 MG/1
TABLET, FILM COATED, EXTENDED RELEASE ORAL
Qty: 42 TABLET | Refills: 0 | Status: SHIPPED | OUTPATIENT
Start: 2023-09-12 | End: 2023-11-27

## 2023-09-29 ENCOUNTER — PATIENT OUTREACH (OUTPATIENT)
Dept: PRIMARY CARE | Facility: CLINIC | Age: 73
End: 2023-09-29
Payer: MEDICARE

## 2023-09-29 DIAGNOSIS — M47.16 OSTEOARTHRITIS OF LUMBAR SPINE WITH MYELOPATHY: ICD-10-CM

## 2023-09-29 DIAGNOSIS — E66.01 OBESITY, MORBID (MULTI): ICD-10-CM

## 2023-09-29 DIAGNOSIS — I10 HYPERTENSION, UNSPECIFIED TYPE: ICD-10-CM

## 2023-09-29 DIAGNOSIS — G47.33 OBSTRUCTIVE SLEEP APNEA: ICD-10-CM

## 2023-09-29 DIAGNOSIS — E66.01 CLASS 2 SEVERE OBESITY DUE TO EXCESS CALORIES WITH SERIOUS COMORBIDITY AND BODY MASS INDEX (BMI) OF 36.0 TO 36.9 IN ADULT (MULTI): ICD-10-CM

## 2023-09-29 PROCEDURE — 99490 CHRNC CARE MGMT STAFF 1ST 20: CPT | Performed by: NURSE PRACTITIONER

## 2023-09-29 NOTE — PROGRESS NOTES
[facility] call to patient. He reports he is doing alright. He did go for sleep study and reports he had failed. He reports he did not sleep enough to be able to qualify for CPAP and he will have to go to have another test done. He does not want to go to same place. Inquired if he is able to go to places he had gone in the past and he will have to get authorizations to have it done. He has appointment scheduled with sleep Dr in a few weeks and they will discuss future plans. He went to endocrinologist. No changes made. He is taking medications as ordered. He has follow up appointment with pain management Dr. He reports he did not schedule appointment with Dr Chaves due to his neck/left shoulder blade pain healed on its own.  Offered phone number for [facility] but he declines at this time. He does feel comfortable to manage his care. Instructed we can opt out of chronic care and always restart services if he does need assistance with anything or if something changes. He is agreeable. NP notified of graduating from [facility].

## 2023-10-05 ENCOUNTER — OFFICE VISIT (OUTPATIENT)
Dept: PRIMARY CARE | Facility: CLINIC | Age: 73
End: 2023-10-05
Payer: MEDICARE

## 2023-10-05 VITALS
OXYGEN SATURATION: 97 % | HEIGHT: 69 IN | SYSTOLIC BLOOD PRESSURE: 118 MMHG | BODY MASS INDEX: 36.76 KG/M2 | HEART RATE: 62 BPM | DIASTOLIC BLOOD PRESSURE: 70 MMHG | WEIGHT: 248.2 LBS

## 2023-10-05 DIAGNOSIS — Z09 HOSPITAL DISCHARGE FOLLOW-UP: Primary | ICD-10-CM

## 2023-10-05 DIAGNOSIS — I10 HYPERTENSION, UNSPECIFIED TYPE: ICD-10-CM

## 2023-10-05 DIAGNOSIS — I10 PRIMARY HYPERTENSION: ICD-10-CM

## 2023-10-05 DIAGNOSIS — J32.4 CHRONIC PANSINUSITIS: ICD-10-CM

## 2023-10-05 DIAGNOSIS — I25.10 CORONARY ARTERY DISEASE INVOLVING NATIVE CORONARY ARTERY OF NATIVE HEART WITHOUT ANGINA PECTORIS: ICD-10-CM

## 2023-10-05 DIAGNOSIS — E78.5 HYPERLIPIDEMIA, UNSPECIFIED HYPERLIPIDEMIA TYPE: ICD-10-CM

## 2023-10-05 PROCEDURE — 1125F AMNT PAIN NOTED PAIN PRSNT: CPT | Performed by: NURSE PRACTITIONER

## 2023-10-05 PROCEDURE — 99214 OFFICE O/P EST MOD 30 MIN: CPT | Performed by: NURSE PRACTITIONER

## 2023-10-05 PROCEDURE — 3074F SYST BP LT 130 MM HG: CPT | Performed by: NURSE PRACTITIONER

## 2023-10-05 PROCEDURE — 1159F MED LIST DOCD IN RCRD: CPT | Performed by: NURSE PRACTITIONER

## 2023-10-05 PROCEDURE — 1036F TOBACCO NON-USER: CPT | Performed by: NURSE PRACTITIONER

## 2023-10-05 PROCEDURE — 3078F DIAST BP <80 MM HG: CPT | Performed by: NURSE PRACTITIONER

## 2023-10-05 PROCEDURE — 1160F RVW MEDS BY RX/DR IN RCRD: CPT | Performed by: NURSE PRACTITIONER

## 2023-10-05 RX ORDER — EPINEPHRINE 0.3 MG/.3ML
1 INJECTION SUBCUTANEOUS ONCE AS NEEDED
COMMUNITY
Start: 2023-07-10

## 2023-10-05 RX ORDER — ATENOLOL 25 MG/1
25 TABLET ORAL DAILY
Qty: 90 TABLET | Refills: 0 | Status: SHIPPED | OUTPATIENT
Start: 2023-10-05 | End: 2024-01-22 | Stop reason: SDUPTHER

## 2023-10-05 RX ORDER — CLOPIDOGREL BISULFATE 75 MG/1
75 TABLET ORAL DAILY
Qty: 90 TABLET | Refills: 0 | Status: SHIPPED | OUTPATIENT
Start: 2023-10-05 | End: 2024-01-22 | Stop reason: SDUPTHER

## 2023-10-05 RX ORDER — LISINOPRIL 10 MG/1
10 TABLET ORAL DAILY
Qty: 90 TABLET | Refills: 1 | Status: SHIPPED | OUTPATIENT
Start: 2023-10-05

## 2023-10-05 RX ORDER — EZETIMIBE 10 MG/1
10 TABLET ORAL DAILY
Qty: 90 TABLET | Refills: 0 | Status: SHIPPED | OUTPATIENT
Start: 2023-10-05 | End: 2024-01-22 | Stop reason: SDUPTHER

## 2023-10-05 ASSESSMENT — COLUMBIA-SUICIDE SEVERITY RATING SCALE - C-SSRS
1. IN THE PAST MONTH, HAVE YOU WISHED YOU WERE DEAD OR WISHED YOU COULD GO TO SLEEP AND NOT WAKE UP?: NO
2. HAVE YOU ACTUALLY HAD ANY THOUGHTS OF KILLING YOURSELF?: NO
6. HAVE YOU EVER DONE ANYTHING, STARTED TO DO ANYTHING, OR PREPARED TO DO ANYTHING TO END YOUR LIFE?: NO

## 2023-10-05 ASSESSMENT — ENCOUNTER SYMPTOMS
DEPRESSION: 0
LOSS OF SENSATION IN FEET: 0
OCCASIONAL FEELINGS OF UNSTEADINESS: 0
NEUROLOGICAL NEGATIVE: 1
CONSTITUTIONAL NEGATIVE: 1
RESPIRATORY NEGATIVE: 1
CARDIOVASCULAR NEGATIVE: 1

## 2023-10-05 ASSESSMENT — PATIENT HEALTH QUESTIONNAIRE - PHQ9
2. FEELING DOWN, DEPRESSED OR HOPELESS: NOT AT ALL
SUM OF ALL RESPONSES TO PHQ9 QUESTIONS 1 AND 2: 0
1. LITTLE INTEREST OR PLEASURE IN DOING THINGS: NOT AT ALL

## 2023-10-05 NOTE — PATIENT INSTRUCTIONS
ER Follow-up and Scalp Wound:    The staples in your scalp need to be removed.  There is a large scab over the fifth staple.  You should apply bacitracin to the wound three times a day for several days.  A follow-up appointment has been scheduled for Tuesday to remove the staples.    Chest X-ray:    A chest x-ray was performed after a fall.  It showed mild atelectasis.  Atelectasis is a condition where a part of the lung collapses or doesn't inflate properly. It can occur for various reasons, including pressure on the lung due to injury or other factors.  You are not currently experiencing any associated symptoms.  The healthcare provider does not believe this is an acute issue.    CT Scan and Chronic Sinusitis:    A CT scan conducted in the ER revealed chronic sinus changes, indicating chronic sinusitis.  You've had sinus surgery in the past, but it hasn't been successful.  A referral has been made for you to follow up with an Ear, Nose, and Throat (ENT) specialist to discuss your chronic sinusitis and explore potential treatment options.

## 2023-10-05 NOTE — PROGRESS NOTES
"Subjective   Patient ID: Phuc Cheek is a 73 y.o. male who presents for Suture / Staple Removal (ER visit on 10/29).    Patient presents today for an ER follow-up.  Patient had fallen.  Patient had CT scans chest x-rays.  Patient reports he has questions on the scans.  Patient has staples on the right aspect of his scalp.  They report there is been a lot of dried blood.  No fevers no chills    Suture / Staple Removal  The sutures were placed 3 to 6 days ago. He tried soaks since the wound repair. There has been bloody (Patient reports appears to be dry blood) discharge from the wound. There is no redness present. There is no swelling present. There is no pain present. He has no difficulty moving the affected extremity or digit.        Review of Systems   Constitutional: Negative.    Respiratory: Negative.     Cardiovascular: Negative.    Neurological: Negative.        Objective   /70   Pulse 62   Ht 1.76 m (5' 9.3\")   Wt 113 kg (248 lb 3.2 oz)   SpO2 97%   BMI 36.34 kg/m²     Physical Exam  Constitutional:       Appearance: Normal appearance.   Pulmonary:      Effort: Pulmonary effort is normal. No respiratory distress.   Skin:            Comments: Ecchymosis noted on right shoulder blade  Scalp laceration with staples intact on right aspect midline area.  4 staples noted unable to see the fifth staple as a large scab over area.   Neurological:      Mental Status: He is alert.         Assessment/Plan   Problem List Items Addressed This Visit             ICD-10-CM    Chronic sinusitis J32.9    Relevant Orders    Referral to ENT     Other Visit Diagnoses         Codes    Hospital discharge follow-up    -  Primary Z09          Reviewed with both patient and wife would like them to use the bacitracin 3 times a day until next Tuesday hopefully that will soften the scabs we can get to the fifth staple and remove staples at that time.  Reviewed CT scan as well as chest x-ray with patient.  Discussed we " will place referral to ENT due to patient having chronic sinusitis noted on CT scan.

## 2023-10-09 ENCOUNTER — OFFICE VISIT (OUTPATIENT)
Dept: PRIMARY CARE | Facility: CLINIC | Age: 73
End: 2023-10-09
Payer: MEDICARE

## 2023-10-09 ENCOUNTER — APPOINTMENT (OUTPATIENT)
Dept: PAIN MEDICINE | Facility: CLINIC | Age: 73
End: 2023-10-09
Payer: MEDICARE

## 2023-10-09 VITALS
BODY MASS INDEX: 36.78 KG/M2 | WEIGHT: 248.3 LBS | SYSTOLIC BLOOD PRESSURE: 128 MMHG | OXYGEN SATURATION: 95 % | HEART RATE: 67 BPM | DIASTOLIC BLOOD PRESSURE: 76 MMHG | HEIGHT: 69 IN
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
## 2023-10-09 DIAGNOSIS — S01.01XD LACERATION OF SCALP, SUBSEQUENT ENCOUNTER: ICD-10-CM

## 2023-10-09 PROBLEM — M51.37 DEGENERATION OF LUMBAR OR LUMBOSACRAL INTERVERTEBRAL DISC: Status: ACTIVE | Noted: 2023-04-11

## 2023-10-09 PROBLEM — K21.9 GASTROESOPHAGEAL REFLUX DISEASE: Status: ACTIVE | Noted: 2023-04-11

## 2023-10-09 PROBLEM — S01.01XA LACERATION OF SCALP: Status: ACTIVE | Noted: 2023-10-09

## 2023-10-09 PROBLEM — M51.379 DEGENERATION OF LUMBAR OR LUMBOSACRAL INTERVERTEBRAL DISC: Status: ACTIVE | Noted: 2023-04-11

## 2023-10-09 PROCEDURE — 1125F AMNT PAIN NOTED PAIN PRSNT: CPT | Performed by: NURSE PRACTITIONER

## 2023-10-09 PROCEDURE — 99213 OFFICE O/P EST LOW 20 MIN: CPT | Performed by: NURSE PRACTITIONER

## 2023-10-09 PROCEDURE — 3078F DIAST BP <80 MM HG: CPT | Performed by: NURSE PRACTITIONER

## 2023-10-09 PROCEDURE — 1159F MED LIST DOCD IN RCRD: CPT | Performed by: NURSE PRACTITIONER

## 2023-10-09 PROCEDURE — 3074F SYST BP LT 130 MM HG: CPT | Performed by: NURSE PRACTITIONER

## 2023-10-09 PROCEDURE — 1036F TOBACCO NON-USER: CPT | Performed by: NURSE PRACTITIONER

## 2023-10-09 PROCEDURE — 1160F RVW MEDS BY RX/DR IN RCRD: CPT | Performed by: NURSE PRACTITIONER

## 2023-10-09 ASSESSMENT — ENCOUNTER SYMPTOMS
CHANGE IN BOWEL HABIT: 0
NECK PAIN: 0
FATIGUE: 0
COUGH: 0
NAUSEA: 0
NUMBNESS: 0
WEAKNESS: 0
FEVER: 0
VERTIGO: 0
ARTHRALGIAS: 0
SWOLLEN GLANDS: 0
VISUAL CHANGE: 0
HEADACHES: 0
MYALGIAS: 0
JOINT SWELLING: 0
ANOREXIA: 0
CHILLS: 0
VOMITING: 0
DIAPHORESIS: 0
SORE THROAT: 0
ABDOMINAL PAIN: 0

## 2023-10-09 NOTE — PROGRESS NOTES
"Subjective   Patient ID: Phuc Cheek is a 73 y.o. male who presents for Suture / Staple Removal and ER Follow-up (9/29/23).    Suture / Staple Removal  The sutures were placed 7 to 10 days ago. He tried antibiotic ointment use since the wound repair. The treatment provided moderate relief. His temperature was unmeasured prior to arrival. There has been clear discharge from the wound. There is no redness present. The swelling has improved. The pain has improved. He has no difficulty moving the affected extremity or digit.   ER Follow-up  This is a new problem. The current episode started 1 to 4 weeks ago. The problem has been gradually improving. Pertinent negatives include no abdominal pain, anorexia, arthralgias, change in bowel habit, chest pain, chills, congestion, coughing, diaphoresis, fatigue, fever, headaches, joint swelling, myalgias, nausea, neck pain, numbness, rash, sore throat, swollen glands, urinary symptoms, vertigo, visual change, vomiting or weakness. Nothing aggravates the symptoms. The treatment provided significant relief.        Review of Systems   Constitutional:  Negative for chills, diaphoresis, fatigue and fever.   HENT:  Negative for congestion and sore throat.    Respiratory:  Negative for cough.    Cardiovascular:  Negative for chest pain.   Gastrointestinal:  Negative for abdominal pain, anorexia, change in bowel habit, nausea and vomiting.   Musculoskeletal:  Negative for arthralgias, joint swelling, myalgias and neck pain.   Skin:  Negative for rash.   Neurological:  Negative for vertigo, weakness, numbness and headaches.       Objective   /76   Pulse 67   Ht 1.76 m (5' 9.3\")   Wt 113 kg (248 lb 4.8 oz)   SpO2 95%   BMI 36.35 kg/m²     Physical Exam  Constitutional:       Appearance: Normal appearance.   Cardiovascular:      Rate and Rhythm: Normal rate.      Heart sounds: Normal heart sounds.   Pulmonary:      Effort: Pulmonary effort is normal.      Breath sounds: " Normal breath sounds.   Skin:     Comments: Scalp laceration on the right side of scalp with some scabbing pink healing no odor or drainage or sign of infection 4 staples intact removed patient tolerated   Neurological:      General: No focal deficit present.      Mental Status: He is alert and oriented to person, place, and time.      Cranial Nerves: No cranial nerve deficit.      Sensory: No sensory deficit.      Motor: No weakness.      Coordination: Coordination normal.      Gait: Gait normal.   Psychiatric:         Mood and Affect: Mood normal.         Behavior: Behavior normal.         Thought Content: Thought content normal.         Judgment: Judgment normal.         Assessment/Plan   Problem List Items Addressed This Visit             ICD-10-CM    Fall at home, sequela - Primary W19.XXXS, Y92.009    Laceration of scalp S01.01XA        Patient ID: Phuc Cheek is a 73 y.o. male.    Suture Removal    Date/Time: 10/9/2023 12:16 PM    Performed by: JIM Juares  Authorized by: JIM Juares    Consent:     Consent obtained:  Verbal    Consent given by:  Patient    Risks, benefits, and alternatives were discussed: yes      Risks discussed:  Bleeding, wound separation and pain    Alternatives discussed:  Delayed treatment, alternative treatment and referral  Universal protocol:     Procedure explained and questions answered to patient or proxy's satisfaction: yes      Relevant documents present and verified: yes      Test results available: yes      Imaging studies available: yes      Site/side marked: yes      Patient identity confirmed:  Verbally with patient  Location:     Location:  Head/neck    Head/neck location:  Scalp  Procedure details:     Wound appearance:  No signs of infection, good wound healing, clean and pink    Number of staples removed:  4  Post-procedure details:     Post-removal:  Antibiotic ointment applied and no dressing applied    Procedure completion:   Tolerated

## 2023-10-09 NOTE — ASSESSMENT & PLAN NOTE
Doing well status post fall.  Patient denies any symptoms of increased intercranial pressure such as dizziness blurred vision loss of balance.  No slurred speech.

## 2023-10-10 ENCOUNTER — APPOINTMENT (OUTPATIENT)
Dept: PRIMARY CARE | Facility: CLINIC | Age: 73
End: 2023-10-10
Payer: MEDICARE

## 2023-10-17 ENCOUNTER — APPOINTMENT (OUTPATIENT)
Dept: PAIN MEDICINE | Facility: CLINIC | Age: 73
End: 2023-10-17
Payer: MEDICARE

## 2023-10-19 ENCOUNTER — OFFICE VISIT (OUTPATIENT)
Dept: PAIN MEDICINE | Facility: CLINIC | Age: 73
End: 2023-10-19
Payer: MEDICARE

## 2023-10-19 VITALS — SYSTOLIC BLOOD PRESSURE: 130 MMHG | HEART RATE: 60 BPM | DIASTOLIC BLOOD PRESSURE: 80 MMHG | RESPIRATION RATE: 16 BRPM

## 2023-10-19 DIAGNOSIS — M96.1 CERVICAL POST-LAMINECTOMY SYNDROME: ICD-10-CM

## 2023-10-19 DIAGNOSIS — F11.90 CHRONIC, CONTINUOUS USE OF OPIOIDS: ICD-10-CM

## 2023-10-19 PROCEDURE — 1159F MED LIST DOCD IN RCRD: CPT | Performed by: ANESTHESIOLOGY

## 2023-10-19 PROCEDURE — 3075F SYST BP GE 130 - 139MM HG: CPT | Performed by: ANESTHESIOLOGY

## 2023-10-19 PROCEDURE — 1036F TOBACCO NON-USER: CPT | Performed by: ANESTHESIOLOGY

## 2023-10-19 PROCEDURE — 1125F AMNT PAIN NOTED PAIN PRSNT: CPT | Performed by: ANESTHESIOLOGY

## 2023-10-19 PROCEDURE — 99213 OFFICE O/P EST LOW 20 MIN: CPT | Performed by: ANESTHESIOLOGY

## 2023-10-19 PROCEDURE — 1160F RVW MEDS BY RX/DR IN RCRD: CPT | Performed by: ANESTHESIOLOGY

## 2023-10-19 PROCEDURE — 3079F DIAST BP 80-89 MM HG: CPT | Performed by: ANESTHESIOLOGY

## 2023-10-19 RX ORDER — OXYCODONE AND ACETAMINOPHEN 10; 325 MG/1; MG/1
1 TABLET ORAL 3 TIMES DAILY PRN
Qty: 84 TABLET | Refills: 0 | Status: SHIPPED | OUTPATIENT
Start: 2023-12-14 | End: 2024-01-09 | Stop reason: SDUPTHER

## 2023-10-19 RX ORDER — NALOXONE HYDROCHLORIDE 4 MG/.1ML
SPRAY NASAL
Qty: 1 EACH | Refills: 1 | Status: SHIPPED | OUTPATIENT
Start: 2023-10-19 | End: 2023-11-27 | Stop reason: ALTCHOICE

## 2023-10-19 RX ORDER — OXYCODONE AND ACETAMINOPHEN 10; 325 MG/1; MG/1
1 TABLET ORAL 3 TIMES DAILY PRN
Qty: 84 TABLET | Refills: 0 | Status: SHIPPED | OUTPATIENT
Start: 2023-10-19 | End: 2024-01-09 | Stop reason: SDUPTHER

## 2023-10-19 RX ORDER — OXYCODONE AND ACETAMINOPHEN 10; 325 MG/1; MG/1
1 TABLET ORAL 3 TIMES DAILY PRN
Qty: 84 TABLET | Refills: 0 | Status: SHIPPED | OUTPATIENT
Start: 2023-11-16 | End: 2024-01-09 | Stop reason: SDUPTHER

## 2023-10-19 RX ORDER — METHOCARBAMOL 750 MG/1
750 TABLET, FILM COATED ORAL 3 TIMES DAILY PRN
Qty: 90 TABLET | Refills: 2 | Status: SHIPPED | OUTPATIENT
Start: 2023-10-19 | End: 2024-01-09 | Stop reason: SDUPTHER

## 2023-10-19 ASSESSMENT — ENCOUNTER SYMPTOMS
DEPRESSION: 0
LOSS OF SENSATION IN FEET: 0
OCCASIONAL FEELINGS OF UNSTEADINESS: 0

## 2023-10-19 ASSESSMENT — PAIN - FUNCTIONAL ASSESSMENT: PAIN_FUNCTIONAL_ASSESSMENT: 0-10

## 2023-10-19 ASSESSMENT — PAIN DESCRIPTION - DESCRIPTORS: DESCRIPTORS: SHARP;JABBING

## 2023-10-19 ASSESSMENT — PAIN SCALES - GENERAL: PAINLEVEL_OUTOF10: 8

## 2023-10-19 NOTE — PROGRESS NOTES
Subjective   Patient ID: Phuc Cheek is a 73 y.o. male who presents for Med Refill.    Med Refill      Patient sustained a head injury secondary to a fall in September 2023 that for which patient was transferred to the emergency room.  Patient had a scalp laceration.  CT of the head and cervical spine no evidence of acute trauma.  Patient has been complaining of neck pain left greater than right.  Patient describes this pain as sharp stabbing in nature exacerbated with the lateral rotation.  No history of radiation to upper extremities.  No history of weakness or numbness in upper extremities.  Patient is not doing any physical therapy.  Current pain medication include oxycodone 10 mg 3 times daily and methocarbamol 750 mg once a night.  Patient reports 80% relief with the current medication    Review of Systems   All other systems reviewed and are negative.         Current Outpatient Medications:     acetaminophen (Tylenol) 500 mg tablet, Take 2 tablets (1,000 mg) by mouth every 6 hours if needed., Disp: , Rfl:     atenolol (Tenormin) 25 mg tablet, Take 1 tablet (25 mg) by mouth once daily., Disp: 90 tablet, Rfl: 0    cetirizine (ZyrTEC) 10 mg tablet, Take 1 tablet (10 mg) by mouth once daily., Disp: , Rfl:     cholecalciferol (Vitamin D-3) 50 MCG (2000 UT) tablet, 1 each orally once a day, Disp: , Rfl:     clopidogrel (Plavix) 75 mg tablet, Take 1 tablet (75 mg) by mouth once daily., Disp: 90 tablet, Rfl: 0    cyanocobalamin (Vitamin B-12) 500 mcg tablet, Take 1 tablet (500 mcg) by mouth once daily., Disp: , Rfl:     EPINEPHrine 0.3 mg/0.3 mL injection syringe, Inject 0.3 mL (0.3 mg) into the shoulder, thigh, or buttocks 1 time if needed for anaphylaxis., Disp: , Rfl:     ezetimibe (Zetia) 10 mg tablet, Take 1 tablet (10 mg) by mouth once daily., Disp: 90 tablet, Rfl: 0    famotidine (Pepcid) 20 mg tablet, Take 1 tablet (20 mg) by mouth once daily., Disp: , Rfl:     folic acid (Folvite) 800 mcg tablet, Take  1 tablet (800 mcg) by mouth once daily., Disp: , Rfl:     furosemide (Lasix) 20 mg tablet, TAKE 1 TABLET BY MOUTH THREE TIMES A WEEK, Disp: 39 tablet, Rfl: 0    lisinopril 10 mg tablet, Take 1 tablet (10 mg) by mouth once daily. for blood pressure, Disp: 90 tablet, Rfl: 1    methocarbamol (Robaxin) 750 mg tablet, Take 1 tablet (750 mg) by mouth every 6 hours during the day., Disp: , Rfl:     naloxone (Narcan) 4 mg/0.1 mL nasal spray, USE AS DIRECTED, Disp: , Rfl:     oxyCODONE-acetaminophen (Percocet)  mg tablet, Take by mouth every 8 hours if needed., Disp: , Rfl:     potassium chloride CR 10 mEq ER tablet, 2 tablets daily, 3 x weekly, Disp: 42 tablet, Rfl: 0    pravastatin (Pravachol) 10 mg tablet, TAKE 1 TABLET BY MOUTH THREE TIMES A WEEK, Disp: 39 tablet, Rfl: 0    zinc chelated (M2-Zinc-50) 50 mg tablet, Take 1 tablet (50 mg) by mouth once daily., Disp: , Rfl:     diazePAM (Valium) 5 mg tablet, Take by mouth. Prn MRI, Disp: , Rfl:      Past Medical History:   Diagnosis Date    Arthritis Unknown    Basal cell carcinoma of skin, unspecified     Skin cancer, basal cell    CHF (congestive heart failure) (CMS/[facility]) 2014    Diverticulosis of intestine, part unspecified, without perforation or abscess without bleeding     Diverticulosis    Eczema 04/11/2023    Heart disease 1999    HLD (hyperlipidemia) 04/11/2023    Hypertension 1999    Myocardial infarction (CMS/[facility]) 11/5/2014    Noninfective gastroenteritis and colitis, unspecified     Colitis    Other abnormal glucose     Hemoglobin A1c less than 7.0%    Personal history of (healed) traumatic fracture     History of fracture of upper extremity    Personal history of other diseases of the circulatory system     History of arteriosclerotic cardiovascular disease    Personal history of other diseases of the digestive system     History of anal fissures    Personal history of other diseases of the digestive system     History of esophageal reflux    Personal  history of other diseases of the digestive system     History of hemorrhoids    Personal history of other diseases of the musculoskeletal system and connective tissue     History of osteoarthritis    Personal history of other diseases of the nervous system and sense organs     History of sleep apnea    Personal history of other diseases of the respiratory system     History of sinusitis    Personal history of other medical treatment     History of stress test    Personal history of other specified conditions     History of headache    Personal history of urinary calculi     History of renal calculi    Restless legs syndrome     RLS (restless legs syndrome)    Sore throat 04/11/2023    Thrush, oral 04/11/2023    Umbilical hernia without obstruction or gangrene     Umbilical hernia    Unspecified atherosclerosis     Blocked artery    Varicella unknown        Past Surgical History:   Procedure Laterality Date    APPENDECTOMY  02/17/2017    Appendectomy    CARDIAC CATHETERIZATION  1999,2000    CERVICAL FUSION  07/22/2016    Cervical Vertebral Fusion    COLONOSCOPY  02/17/2017    Colonoscopy    CORONARY ARTERY BYPASS GRAFT  07/22/2016    CABG    CORONARY STENT PLACEMENT  1999,2000,2014    ESOPHAGOGASTRODUODENOSCOPY  02/17/2017    Diagnostic Esophagogastroduodenoscopy    HAND TENDON SURGERY  07/22/2016    Hand Incision Tendon Sheath Of A Finger    HEMORRHOID SURGERY  02/17/2017    Hemorrhoidectomy    HERNIA REPAIR  02/17/2017    Inguinal Hernia Repair    JOINT REPLACEMENT  4/29/2014 &3/14/2022 knees    KNEE SURGERY  07/22/2016    Knee Surgery    MR HEAD ANGIO WO IV CONTRAST  05/28/2016    MR HEAD ANGIO WO IV CONTRAST 5/28/2016 [facility] CLINICAL LEGACY    MR HEAD ANGIO WO IV CONTRAST  05/03/2018    MR HEAD ANGIO WO IV CONTRAST 5/3/2018 [facility]    MR HEAD ANGIO WO IV CONTRAST  05/03/2018    MR HEAD ANGIO WO IV CONTRAST 5/3/2018 [facility]    MR NECK ANGIO WO IV CONTRAST  05/28/2016    MR NECK ANGIO WO IV  CONTRAST 5/28/2016 [facility] CLINICAL LEGACY    MR NECK ANGIO WO IV CONTRAST  05/03/2018    MR NECK ANGIO WO IV CONTRAST 5/3/2018 [facility]    OTHER SURGICAL HISTORY  07/22/2016    Biopsy Skin    OTHER SURGICAL HISTORY  10/08/2021    Heart surgery    OTHER SURGICAL HISTORY  02/17/2017    Excision Of Leg Soft Tissue Tumor    OTHER SURGICAL HISTORY  02/17/2017    Nerve Block Cervical Plexus    OTHER SURGICAL HISTORY  02/17/2017    Nerve Block Lumbar Plexus    OTHER SURGICAL HISTORY  02/17/2017    Acupuncture One Or More Tignall    OTHER SURGICAL HISTORY  02/17/2017    Angioplasty    OTHER SURGICAL HISTORY  02/17/2017    Arterial Catheterization    OTHER SURGICAL HISTORY  02/17/2017    Cystoscopy With Biopsy    SINUS SURGERY  10/29/2014    SPINE SURGERY  1993 & 2018 neck fusion    SPLENECTOMY, TOTAL  02/17/2017    Splenectomy    TONSILLECTOMY  07/22/2016    Tonsillectomy        Family History   Problem Relation Name Age of Onset    Cancer Mother MOM         breast    Heart disease Mother MOM     Breast cancer Mother MOM     Arthritis Mother MOM     Hyperlipidemia Mother MOM     Hypertension Mother MOM     Heart attack Mother MOM     Arthritis Father DAD     Other (bladder cancer) Father DAD     Diabetes Father DAD     Heart disease Father DAD     Other (cabg) Father DAD     Hypertension Father DAD     Heart attack Father DAD     Skin cancer Father DAD     Diabetes Sister Sister     Arthritis Sister Sister     Goiter Sister Sister     Hyperlipidemia Sister Sister     Heart disease Sister Sister only     Accidental death Brother Brother     Arthritis Brother Brother     Hyperlipidemia Brother Brother     Heart disease Brother Brother only         Allergies   Allergen Reactions    Adhesive Tape-Silicones Unknown     Adhesive Tape TAPE    Bee Venom Protein (Honey Bee) Hives and Swelling    Niacin Other    Simvastatin Myalgia    Tetracyclines Unknown     Nausea and GI upset        Objective     Vitals:    10/19/23  1451   BP: 130/80   Pulse: 60   Resp: 16        === 06/24/23 ===    MR FOOT    - Impression -  Mild midfoot arthrosis.    Likely partial plantar plate tear 2nd MTP.    Evidence of stress fracture or stress-related injury.     Physical Exam    GENERAL EXAM  Vital Signs: Vital signs to include heart rate, respiration rate, blood pressure, and temperature were reviewed.  General Appearance:  Awake, alert, healthy appearing, well developed, No acute distress.  Head: Normocephalic without evidence of head injury.  Neck: The appearance of the neck was normal without swelling with a midline trachea.  Eyes: The eyelids and eyebrows exhibited no abnormalities.  Pupils were not pin-point.  Sclera was without icterus.  Lungs: Respiration rhythm and depth was normal.  Respiratory movements were normal without labored breathing.  Cardiovascular: No peripheral edema was present.    Neurological: Patient was oriented to time, place, and person.  Speech was normal.  Balance, gait, and stance were unremarkable.    Psychiatric: Appearance was normal with appropriate dress.  Mood was euthymic and affect was normal.  Skin: Affected regions were without ecchymosis or skin lesions.        Physical exam as above except:  Neck: Limitation of motion with the lateral rotation left greater than right as well as flexion extension.  Tenderness to palpation left paraspinal muscles especially at C3-C4 cervical facet joint.  Negative Spurling sign.  Sensory: Normal to touch and pinprick in upper extremities bilaterally.  Okay      Assessment/Plan        Cervical facet arthropathy  Plan  Continue Percocet 10/325 1 tablet 3 times daily  Continue methocarbamol 750 mg once at bedtime  Refer patient for physical therapy for neck muscle strengthening  Consider left C3-C4 medial branch block  Risk and benefits were discussed and patient is amenable to the plan.

## 2023-10-19 NOTE — PROGRESS NOTES
"Where is your pain neck-recent fall 3 feet off ladder. 4 staples to scalp. Ct scan of head normal. Left arm tingling. LBP \"under control\" Denies numbness or tingling to lower extramities.  Average pain score with medication 8  How effective is medication in providing relief ? 80% for LBP, \"not touching the neck pain\"  Functional status retired  Are you able to manage ADL'S? yes  Do you take medications as prescribed? yes  Overall quality of family/social life with current provider average  ER visits since last office visit? Yes, fall off ladder  Any new medical issues since last visit? no  Aberrant behavior? no  Contract date 12/14/22  Toxicology date 12/14/22 consistent  Narcan RX Needs    "

## 2023-11-06 ENCOUNTER — EVALUATION (OUTPATIENT)
Dept: PHYSICAL THERAPY | Facility: CLINIC | Age: 73
End: 2023-11-06
Payer: MEDICARE

## 2023-11-06 DIAGNOSIS — R29.898 DECREASED RANGE OF MOTION OF NECK: ICD-10-CM

## 2023-11-06 DIAGNOSIS — R29.3 POOR POSTURE: ICD-10-CM

## 2023-11-06 DIAGNOSIS — M96.1 CERVICAL POST-LAMINECTOMY SYNDROME: Primary | ICD-10-CM

## 2023-11-06 PROCEDURE — 97110 THERAPEUTIC EXERCISES: CPT | Mod: GP

## 2023-11-06 PROCEDURE — 97161 PT EVAL LOW COMPLEX 20 MIN: CPT | Mod: GP

## 2023-11-06 ASSESSMENT — ENCOUNTER SYMPTOMS
OCCASIONAL FEELINGS OF UNSTEADINESS: 1
DEPRESSION: 0
LOSS OF SENSATION IN FEET: 0

## 2023-11-06 NOTE — PROGRESS NOTES
Physical Therapy    Physical Therapy Evaluation and Treatment      Patient Name: Phuc Cheek  MRN: [id number]  Today's Date: 11/6/2023  Time Calculation  Start Time: 1220  Stop Time: 1300  Time Calculation (min): 40 min      Assessment:  Rehab Prognosis: Good  Evaluation/Treatment Tolerance: Patient limited by pain  The patient is a 72 y/o male being seen in outpatient therapy to address cervical pain s/p a fall in September 2023.  The patient demonstrates the following impairments: decreased cervical AROM, decreased UE strength per MMT, impaired postural positioning, and patient reports of pain along B sides of neck and sx into LUE. The above listed impairments lead to the following functional limitations: difficulty completing ADLs/IADLs, difficulty competing work around his home, difficulty turning head during functional activities, and decreased sleep quality due to pain in neck when initially laying down. The patient would benefit from skilled physical therapy services to address the above listed impairments and functional deficits in order to return the patient to their PLOF and improve QoL.     Plan:  Treatment/Interventions: Cryotherapy, Dry needling, Education/ Instruction, Gait training, Hot pack, Manual therapy, Neuromuscular re-education, Therapeutic activities, Therapeutic exercises  PT Plan: Skilled PT  PT Frequency: 2 times per week  Duration: 6 weeks  Onset Date: 11/06/23  Rehab Potential: Good  Plan of Care Agreement: Patient    Current Problem:   1. Cervical post-laminectomy syndrome  Referral to Physical Therapy    Follow Up In Physical Therapy      2. Decreased range of motion of neck        3. Poor posture            Subjective    General:  General  Reason for Referral: Cervical Post laminectomy syndrome  Referred By: Dr. Osvaldo Kuhn  Past Medical History Relevant to Rehab: hx of 2 fusion surgeries    The patient reports he has a history of cervical fusion in 1993 and 2016. Notes he had a  fall in September of this year, hit his head. Notes pain was initially on right side of neck, but then as it started to heal pain went onto right side. Notes no brain injury from fall in September. Notes he has had injecftions over the years, but not since September. Currently taking prescription medication for pain. Notes he gets N/T in palm of right hand when driving, nothing else seems to bring it on. Has pain when first laying down.     Did PT following fusions but no PT for neck recently.   Pt notes no pain down BUEs. No changes in B/B function .    Precautions:  Precautions  STEADI Fall Risk Score (The score of 4 or more indicates an increased risk of falling): 6  PMH: hx of 2 fusions (notes C4-7 is fused), hx of basal cell cancer, hx of HD and stents in heart, HTN, hx of 2 TKAs      Pain:  Current: 7-8/10 pain on back of left side of neck  Best: 3/10; improved by heating pad, massage, pain medication   Worst: 10/10 ; worsened by driving, when first laying down, turning neck to both directions but mostly left   Patient reported decreased neck pain at end of session (did not rate pain numerically). Patient left therapy in no distress.     Home Livin[place]. No trouble getting around home.   Lives with wife.     Currently retired.   Hobbies: working outside    Objective   STEADI Score: 6 (4 or more indicates increased fall risk)  Cervical Spine    Observation  Cervical Posture: Forward head and B shoulders (rounded)  C-Spine Palpation/Joint Mobility Assessment   C-Spine Palpation/Joint Mobility Assessment:: TTP noted along proximal right upper trap and levator. Tightness palpable in B UT  Cervical AROM  Cervical flexion: (80°): 36 P!  Cervical extension: (50°): 38 stiff  Cervical roatation right: (80°): 63 P! end ROM  Cervical rotation left: (80°): 46 P! whole motion  Cervical sidebend right: (45°): 22 P!  Cervical sidebend left: (45°): 28  Shoulder AROM  Shoulder AROM WFL: yes    Myotomes (MMT)  R Scapular  Elevation (C4 ): (5/5): 5 (P!)  L Scapular Elevation (C4 ): (5/5): 5 (P!)  R Shoulder Abduction (C5 ): (5/5): 4+ (P!)  L Shoulder Abduction (C5 ): (5/5): 4+ (P!)  R Elbow Flexion (C6 ): (5/5): 5  L Elbow Flexion (C6 ): (5/5): 5  R Elbow Extension (C7 ): (5/5): 4+  L Elbow Extension (C7 ): (5/5): 4+  R/L ER at side: 4+/5  R/L IR at side: 5/5   Strength:   R: 45#   L: 60#   Patient is right handed    Shoulder AROM WFL unless documented below  Shoulder AROM WFL: yes      Outcome Measures:  Neck Disability Index: 10 points     Treatments:   Therapeutic Exercise:   Seated scapular retraction x 10 reps 5 sec hold  Seated B shoulder rolls posterior x 10 reps   Supine cervical retraction x 3 reps; increased pain on B sides of neck   Supine cervical SB in pain free ROM x 8 reps R/L in pain free ROM     Patient educated on safe completion of HEP in pain free ROM and to hold any exercise that causes increased pain. Patient educated on centralization of pain and avoiding activities that cause increased UE symptoms. Pt reported understanding.      OP EDUCATION:  Education  Individual(s) Educated: Patient  Education Provided: POC, Home Exercise Program  Patient/Caregiver Demonstrated Understanding: yes  Plan of Care Discussed and Agreed Upon: yes  Patient Response to Education: Patient/Caregiver Verbalized Understanding of Information  Access Code: [email or web address]  URL: [email or web address]  Date: 11/06/2023  Prepared by:  [employer]    Program Notes  Hold any activity that causes increased pain/symptoms. Complete all exercises in pain free range of motion.     Exercises  - Seated Scapular Retraction  - 2 x daily - 7 x weekly - 2 sets - 10 reps  - Seated Shoulder Rolls  - 2 x daily - 7 x weekly - 2 sets - 10 reps  - Supine Cervical Sidebending  - 2 x daily - 7 x weekly - 1-2 sets - 10 reps - 3 sec hold  Patient educated on PT purpose, POC, d/c planning, and importance of HEP compliance.    Goals:  Active        PT Problem       PT Goal 1       Start:  11/06/23    Expected End:  12/18/23       The patient will score 5 points or less on Neck Disability Index in order to demonstrate improved sx level and improved ability to complete functional mobility tasks and ADLs/IADLs.           PT Goal 2       Start:  11/06/23    Expected End:  12/18/23       The patient will demonstrate improved cervical AROM (by at least 5-10 degrees) in order to improve ability to complete functional activities and mobility to complete ADLs/IADLs and daily activities          PT Goal 3       Start:  11/06/23    Expected End:  12/18/23       The patient will report decreased  (0-3/10) pain in neck during functional activities in order to demonstrate improved pain levels.           PT Goal 4       Start:  11/06/23    Expected End:  12/18/23       The patient will demonstrate 5/5 strength via MMT of UEs in order to improve ability to complete functional activities and mobility for ADLs/IADLs.           PT Goal 5       Start:  11/06/23    Expected End:  12/18/23       The patient will subjectively report compliance with recommended HEP in order to maximize functional gains during physical therapy plan of care.

## 2023-11-06 NOTE — Clinical Note
November 6, 2023     Patient: Phuc Cheek   YOB: 1950   Date of Visit: 11/6/2023       To Whom it May Concern:    Phuc Cheek was seen in my clinic on 11/6/2023. He {Return to school/sport:46101}.    If you have any questions or concerns, please don't hesitate to call.         Sincerely,          Paige Calloway, PT        CC: No Recipients

## 2023-11-06 NOTE — Clinical Note
November 6, 2023     Patient: Phuc Cheek   YOB: 1950   Date of Visit: 11/6/2023       To Whom It May Concern:    It is my medical opinion that Phuc Cheek {Work release (duty restriction):13974}.    If you have any questions or concerns, please don't hesitate to call.         Sincerely,        Paige Calloway, PT    CC: No Recipients

## 2023-11-16 NOTE — PROGRESS NOTES
Physical Therapy    Physical Therapy Treatment    Patient Name: Phuc Cheek  MRN: [id number]  Today's Date: 11/17/2023   Time In: 08;15  Time Out: 09:07         Assessment:   In-clinic program initiated this date. Patient with great difficulty completing L cervical rotation, noting increased numbness in L hand when turning head to L. Patient noted no improvement in symptoms following manual therapy this date, noting he continued to have pain down L side of neck. Patient educated to hold cervical retraction from HEP due to patient reports of it causing a flare up in his pain levels. Patient reported decreased pain to 5/10 at end of session. Patient would continue to benefit form skilled PT services.     Plan:   Progress POC as able and tolerated by patient.     Current Problem  1. Decreased range of motion of neck        2. Cervical post-laminectomy syndrome  Follow Up In Physical Therapy      3. Poor posture            General     General  Reason for Referral: Cervical Post laminectomy syndrome  Referred By: Dr. Osvaldo Kuhn  Past Medical History Relevant to Rehab: hx of 2 fusion surgeries    Subjective    The patient reports his neck is sore today. Took a drive to [facility] last week, felt okay until the drive home and it tightened up. Notes he thinks that the supine chin tuck flared up his pain yesterday. Notes he did some leaf blowing and barely had any pain during it, but then as evening progressed, he had more pain. Notes pain down left shoulder blade when carrying backpack at [employer] back in June, notes it has been going on for a while.     Pain   Pt notes 6-7/10 pain along L shoulder/UT region, worse with turning head to L at start of session. Patient initially reported increased pain after completing exercises, but noted decreased pain to 5/10 during MHP and at end of session. Patient left therapy in no distress.       Treatments:  Therapeutic Exercise:   -Seated scapular retraction x 10 reps 5 sec  hold  -Seated B shoulder rolls posterior x 10 reps; increased discomfort   -Seated B shoulder rolls anterior 2 x 10 reps; no increased pain   -Seated R cervical rotation 2 x 5 reps, 5 sec hold; no increased pain   -Seated B scapular retraction 5 sec hold x 10 reps   -Supine horizontal abduction prayer hands x 4 reps (pt noted increased pain in L elbow) so modified to hands behind head x 10 reps, no increased pain   -Supine horizontal abduction with orange tband palms down x 10 reps       Manual Therapy:   Light STM to cervical paraspinals and suboccipitals with patient in supine laying. Tightness present on L suboccipitals versus R. Very light SOR 20 sec x 3 reps     MHP to L side of neck/UT in seated x 10 min at end of session.       OP EDUCATION:   Patient educated on safe completion of HEP in pain free ROM. Patient educated on correct exercise technique  Access Code: [email or web address]  URL: [email or web address]  Date: 11/17/2023  Prepared by: [employer]    Program Notes  Hold any activity that causes increased pain/symptoms. Complete all exercises in pain free range of motion.     Exercises  - Seated Scapular Retraction  - 2 x daily - 7 x weekly - 2 sets - 10 reps  - Seated Shoulder Rolls  - 2 x daily - 7 x weekly - 2 sets - 10 reps  - Supine Cervical Sidebending  - 2 x daily - 7 x weekly - 1-2 sets - 10 reps - 3 sec hold  - Supine Chest Stretch with Elbows Bent  - 2 x daily - 7 x weekly - 1-2 sets - 10 reps - 5 sec  hold    Goals:  Active       PT Problem       PT Goal 1       Start:  11/06/23    Expected End:  12/18/23       The patient will score 5 points or less on Neck Disability Index in order to demonstrate improved sx level and improved ability to complete functional mobility tasks and ADLs/IADLs.           PT Goal 2       Start:  11/06/23    Expected End:  12/18/23       The patient will demonstrate improved cervical AROM (by at least 5-10 degrees) in order to improve ability to  complete functional activities and mobility to complete ADLs/IADLs and daily activities          PT Goal 3       Start:  11/06/23    Expected End:  12/18/23       The patient will report decreased  (0-3/10) pain in neck during functional activities in order to demonstrate improved pain levels.           PT Goal 4       Start:  11/06/23    Expected End:  12/18/23       The patient will demonstrate 5/5 strength via MMT of UEs in order to improve ability to complete functional activities and mobility for ADLs/IADLs.           PT Goal 5       Start:  11/06/23    Expected End:  12/18/23       The patient will subjectively report compliance with recommended HEP in order to maximize functional gains during physical therapy plan of care.

## 2023-11-17 ENCOUNTER — TREATMENT (OUTPATIENT)
Dept: PHYSICAL THERAPY | Facility: CLINIC | Age: 73
End: 2023-11-17
Payer: MEDICARE

## 2023-11-17 DIAGNOSIS — R29.3 POOR POSTURE: ICD-10-CM

## 2023-11-17 DIAGNOSIS — R29.898 DECREASED RANGE OF MOTION OF NECK: Primary | ICD-10-CM

## 2023-11-17 DIAGNOSIS — M96.1 CERVICAL POST-LAMINECTOMY SYNDROME: ICD-10-CM

## 2023-11-17 PROCEDURE — 97110 THERAPEUTIC EXERCISES: CPT | Mod: GP

## 2023-11-17 PROCEDURE — 97140 MANUAL THERAPY 1/> REGIONS: CPT | Mod: GP

## 2023-11-21 NOTE — PROGRESS NOTES
"Physical Therapy    Physical Therapy Treatment    Patient Name: Phuc Cheek  MRN: [id number]  Today's Date: 11/22/2023  Time Calculation  Start Time: 0803  Stop Time: 0843  Time Calculation (min): 40 min      Assessment:   Session modified this date per patient reports of increased pain in neck following last session. Patient able to complete activities this date without reports of increased pain in neck noting 5/10 pain at start of session and 1-2/10 pain at end of session. Patient educated to complete HEP as able, pending it does not cause increased pain. Assess patient's response to this session next visit and progress program as able and tolerated by patient     Plan:   Assess patient's response to this session next visit and progress program as able and tolerated.     Current Problem  1. Cervical post-laminectomy syndrome  Follow Up In Physical Therapy          General     General  Reason for Referral: Cervical Post laminectomy syndrome  Referred By: Dr. Osvaldo Kuhn  Past Medical History Relevant to Rehab: hx of 2 fusion surgeries    Subjective    The patient reports he went out to breakfast and shopping with his wife after last visit and then was \"miserable\" the rest of the day. Notes it was it could have been muscle soreness, but had pain. Notes numbness in L hand comes and goes- notes Monday he was laying on left side and his neck was sore and the pain shot down into his hand/numbness in hand. Notes pain started to resolve as he got up and moved around. Notes he does not believe that the home exercises have made it worse, but he isn't sure.     Precautions  Fall risk   Pain   Patient notes 5/10 pain in neck on left side, down into lower aspect of neck/upper thoracic region. Pt notes 1-2/10 pain at end of session in same region. Pt left therapy in no distress.     Treatments:  Therapeutic Exercise:   -Seated B anterior shoulder rolls 2 x 10 reps- no change in pain   -Seated L LS stretch in seated 2 x " 20 sec   - Seated B scapular retraction, 5 sec hold x 20 reps; felt pull on B sides   Supine with towel roll under neck and 1 pillow:   -B horizontal abduction with hands behind head 2 x 4 reps, 10 sec hold   -R cervical SB x 10 reps, 5 sec hold  -R cervical rotation x 6 reps, 5 sec hold; increased discomfort, resolved upon sitting up       MHP to L side of neck/shoulder in seated with back support x 10 minutes       OP EDUCATION:   Correct exercise technique and to hold any exercises at home that cause increased pain.      Goals:  Active       PT Problem       PT Goal 1       Start:  11/06/23    Expected End:  12/18/23       The patient will score 5 points or less on Neck Disability Index in order to demonstrate improved sx level and improved ability to complete functional mobility tasks and ADLs/IADLs.           PT Goal 2       Start:  11/06/23    Expected End:  12/18/23       The patient will demonstrate improved cervical AROM (by at least 5-10 degrees) in order to improve ability to complete functional activities and mobility to complete ADLs/IADLs and daily activities          PT Goal 3       Start:  11/06/23    Expected End:  12/18/23       The patient will report decreased  (0-3/10) pain in neck during functional activities in order to demonstrate improved pain levels.           PT Goal 4       Start:  11/06/23    Expected End:  12/18/23       The patient will demonstrate 5/5 strength via MMT of UEs in order to improve ability to complete functional activities and mobility for ADLs/IADLs.           PT Goal 5       Start:  11/06/23    Expected End:  12/18/23       The patient will subjectively report compliance with recommended HEP in order to maximize functional gains during physical therapy plan of care.

## 2023-11-22 ENCOUNTER — TREATMENT (OUTPATIENT)
Dept: PHYSICAL THERAPY | Facility: CLINIC | Age: 73
End: 2023-11-22
Payer: MEDICARE

## 2023-11-22 DIAGNOSIS — M96.1 CERVICAL POST-LAMINECTOMY SYNDROME: ICD-10-CM

## 2023-11-22 PROCEDURE — 97110 THERAPEUTIC EXERCISES: CPT | Mod: GP

## 2023-11-24 ENCOUNTER — TREATMENT (OUTPATIENT)
Dept: PHYSICAL THERAPY | Facility: CLINIC | Age: 73
End: 2023-11-24
Payer: MEDICARE

## 2023-11-24 DIAGNOSIS — M96.1 CERVICAL POST-LAMINECTOMY SYNDROME: ICD-10-CM

## 2023-11-24 DIAGNOSIS — R29.3 POOR POSTURE: ICD-10-CM

## 2023-11-24 DIAGNOSIS — R29.898 DECREASED RANGE OF MOTION OF NECK: Primary | ICD-10-CM

## 2023-11-24 PROCEDURE — 97110 THERAPEUTIC EXERCISES: CPT | Mod: GP

## 2023-11-24 NOTE — PROGRESS NOTES
Physical Therapy    Physical Therapy Treatment    Patient Name: Phuc Cheek  MRN: [id number]  Today's Date: 11/24/2023  Time Calculation  Start Time: 0819  Stop Time: 0900  Time Calculation (min): 41 min      Assessment:   Attempted cervical retraction in supine with patient in a relatively flexed cervical spine position to assess response. Patient noted increased pain down LUE that worsened as he laid supine for longer period of time, so patient completed remainder of session in seated position. Added light cervical isometric exercises to session this date, with patient reporting slightly increased discomfort after completing them, but noted decreased pain by end of session. Assess patient's response to this session next visit and progress program as able and tolerated by patient.       Plan:   Assess patient's response to this session next visit and progress program as able and tolerated.    Current Problem  1. Decreased range of motion of neck        2. Cervical post-laminectomy syndrome  Follow Up In Physical Therapy      3. Poor posture            General          Subjective    The patient reports he went shopping after last visit, notes he was carrying bread and had tingling all down left arm. Notes he had a flood in his kitchen and spent a couple hours on his back fixing it. Notes yesterday he did not do any exercises, notes it would flare up and then calm down. Notes he felt okay this morning but then started to have burning in neck when driving here.     Precautions     Vital Signs     Pain   3/10 burning pain on left side of neck. No numbness in either hand/UE at start of session. Patient reported 2/10 pain on left side of neck/UT at end of session. Pt declined use of MHP at end of session.     Treatments:  Therapeutic Exercise:   Supine with large wedge, 2 pillows:   -Sub max chin tuck with 5 sec hold x 10 reps; pt reported increased pain sensation in L arm (whole arm). Noted initial increase in neck  pain but it resolved after the first rep   Seated:   -Cervical flexion x 10 reps, 3 sec hold; no worse LUE discomfort, noted no difficulty completing flexion, noting increased pain on left side of neck when completing motion from flexion to neutral   -Seated scapular retraction 2 x 10 reps, 5 sec hold; no change in pain   -Seated anterior shoulder rolls x 20 reps; no change in pain   -Seated isometric cervical flexors 2 x 5 reps, 5 sec hold   -Seated isometric cervical SB to right  x 5 reps, 3 sec hold; increased discomfort across neck to right side of neck     OP EDUCATION:   Correct exercise technique. Completing HEP in pain free ROM only and holding any exercise that causes increased pain     Goals:  Active       PT Problem       PT Goal 1       Start:  11/06/23    Expected End:  12/18/23       The patient will score 5 points or less on Neck Disability Index in order to demonstrate improved sx level and improved ability to complete functional mobility tasks and ADLs/IADLs.           PT Goal 2       Start:  11/06/23    Expected End:  12/18/23       The patient will demonstrate improved cervical AROM (by at least 5-10 degrees) in order to improve ability to complete functional activities and mobility to complete ADLs/IADLs and daily activities          PT Goal 3       Start:  11/06/23    Expected End:  12/18/23       The patient will report decreased  (0-3/10) pain in neck during functional activities in order to demonstrate improved pain levels.           PT Goal 4       Start:  11/06/23    Expected End:  12/18/23       The patient will demonstrate 5/5 strength via MMT of UEs in order to improve ability to complete functional activities and mobility for ADLs/IADLs.           PT Goal 5       Start:  11/06/23    Expected End:  12/18/23       The patient will subjectively report compliance with recommended HEP in order to maximize functional gains during physical therapy plan of care.

## 2023-11-27 ENCOUNTER — TREATMENT (OUTPATIENT)
Dept: PHYSICAL THERAPY | Facility: CLINIC | Age: 73
End: 2023-11-27
Payer: MEDICARE

## 2023-11-27 DIAGNOSIS — M96.1 CERVICAL POST-LAMINECTOMY SYNDROME: ICD-10-CM

## 2023-11-27 PROCEDURE — 97110 THERAPEUTIC EXERCISES: CPT | Mod: GP

## 2023-11-27 NOTE — PROGRESS NOTES
"Physical Therapy    Physical Therapy Treatment    Patient Name: Phuc Cheek  MRN: [id number]  Today's Date: 11/27/2023  Time Calculation  Start Time: 0845  Stop Time: 0928  Time Calculation (min): 43 min      Assessment:   Initially cervical motion and LUE motion in sitting aggravated tingling in LUE, but not the pain in neck.  However, he was able to tolerate scapular strengthening exercises in supine with only one pillow without increase in symptoms.  Demonstrates poor sitting posture.  Reports symptoms no worse at the end of session.     Plan:   Assess patient's response to this session next visit and progress program as able and tolerated.        Current Problem  1. Cervical post-laminectomy syndrome  Follow Up In Physical Therapy          Subjective   Patient reports \"not too bad\", but felt numbness in left hand while driving here.  Also 1-2/10 pain in left neck. States he has had numbness and tingling in arms off and on  since the surgery.     Precautions  Precautions  STEADI Fall Risk Score (The score of 4 or more indicates an increased risk of falling): 6  PMH: hx of 2 fusions (notes C4-7 is fused), hx of basal cell cancer, hx of HD and stents in heart, HTN, hx of 2 TKAs    Treatments:  Seated  Cervical flexion 1 x 10 reps, produced tingling in LUE  B shoulder shrugs 1 x 10 reps   B shoulder circles BWD 2 x 10 reps   B scapular retraction with shoulder ER 2 x 10 reps  B shoulder ER with orange TB 2 x 10 reps    Supine with one pillow  B shoulder flexion with wand 2 x 10 reps  B scapular retraction with UE in neutral 3-5\" hold 2 x 10 reps   B shoulder horizontal ABD with palms down and orange TB 2 x 10 reps  R/L UE PNF D2 flexion with orange TB 2 x 10 reps    Manual Therapy  STM to L posterolateral neck in sitting x 5 mins at the end of session.     Seated - deferred this date 11/27/23  -Seated isometric cervical flexors 2 x 5 reps, 5 sec hold   -Seated isometric cervical SB to right  x 5 reps, 3 sec " hold; increased discomfort across neck to right side of neck      OP EDUCATION:  Correct exercise technique. Completing HEP in pain free ROM only and holding any exercise that causes increased pain         Goals:  Active       PT Problem       PT Goal 1       Start:  11/06/23    Expected End:  12/18/23       The patient will score 5 points or less on Neck Disability Index in order to demonstrate improved sx level and improved ability to complete functional mobility tasks and ADLs/IADLs.           PT Goal 2       Start:  11/06/23    Expected End:  12/18/23       The patient will demonstrate improved cervical AROM (by at least 5-10 degrees) in order to improve ability to complete functional activities and mobility to complete ADLs/IADLs and daily activities          PT Goal 3       Start:  11/06/23    Expected End:  12/18/23       The patient will report decreased  (0-3/10) pain in neck during functional activities in order to demonstrate improved pain levels.           PT Goal 4       Start:  11/06/23    Expected End:  12/18/23       The patient will demonstrate 5/5 strength via MMT of UEs in order to improve ability to complete functional activities and mobility for ADLs/IADLs.           PT Goal 5       Start:  11/06/23    Expected End:  12/18/23       The patient will subjectively report compliance with recommended HEP in order to maximize functional gains during physical therapy plan of care.

## 2023-11-28 NOTE — PROGRESS NOTES
Physical Therapy    Physical Therapy Treatment    Patient Name: Phuc Cheek  MRN: [id number]  Today's Date: 11/28/2023         Assessment:     Plan:       Current Problem  No diagnosis found.    General          Subjective    Precautions     Vital Signs     Pain       Objective   Cognition     Posture     Extremity/Trunk Assessment  {Extremity/Trunk Assessments:27734}  {Spine,UE,LE,HAND,LYMPHEDEMA:75888}  Activity Tolerance:     Outcome Measures:  {PT Outcome Measures:43714}  Treatments:  {PT Treatments:58861}    OP EDUCATION:       Goals:  Active       PT Problem       PT Goal 1       Start:  11/06/23    Expected End:  12/18/23       The patient will score 5 points or less on Neck Disability Index in order to demonstrate improved sx level and improved ability to complete functional mobility tasks and ADLs/IADLs.           PT Goal 2       Start:  11/06/23    Expected End:  12/18/23       The patient will demonstrate improved cervical AROM (by at least 5-10 degrees) in order to improve ability to complete functional activities and mobility to complete ADLs/IADLs and daily activities          PT Goal 3       Start:  11/06/23    Expected End:  12/18/23       The patient will report decreased  (0-3/10) pain in neck during functional activities in order to demonstrate improved pain levels.           PT Goal 4       Start:  11/06/23    Expected End:  12/18/23       The patient will demonstrate 5/5 strength via MMT of UEs in order to improve ability to complete functional activities and mobility for ADLs/IADLs.           PT Goal 5       Start:  11/06/23    Expected End:  12/18/23       The patient will subjectively report compliance with recommended HEP in order to maximize functional gains during physical therapy plan of care.

## 2023-11-29 ENCOUNTER — APPOINTMENT (OUTPATIENT)
Dept: PHYSICAL THERAPY | Facility: CLINIC | Age: 73
End: 2023-11-29
Payer: MEDICARE

## 2023-11-30 DIAGNOSIS — I25.10 CVD (CARDIOVASCULAR DISEASE): ICD-10-CM

## 2023-11-30 DIAGNOSIS — I10 HYPERTENSION, UNSPECIFIED TYPE: ICD-10-CM

## 2023-12-01 RX ORDER — FUROSEMIDE 20 MG/1
TABLET ORAL
Qty: 39 TABLET | Refills: 0 | Status: SHIPPED | OUTPATIENT
Start: 2023-12-01 | End: 2024-02-19 | Stop reason: SDUPTHER

## 2023-12-04 ENCOUNTER — TREATMENT (OUTPATIENT)
Dept: PHYSICAL THERAPY | Facility: CLINIC | Age: 73
End: 2023-12-04
Payer: MEDICARE

## 2023-12-04 DIAGNOSIS — R29.898 DECREASED RANGE OF MOTION OF NECK: Primary | ICD-10-CM

## 2023-12-04 DIAGNOSIS — M96.1 CERVICAL POST-LAMINECTOMY SYNDROME: ICD-10-CM

## 2023-12-04 DIAGNOSIS — R29.3 POOR POSTURE: ICD-10-CM

## 2023-12-04 PROCEDURE — 97110 THERAPEUTIC EXERCISES: CPT | Mod: GP | Performed by: PHYSICAL THERAPIST

## 2023-12-04 NOTE — PROGRESS NOTES
Physical Therapy    Physical Therapy Treatment    Patient Name: Phuc Cheek  MRN: [id number]  Today's Date: 12/4/2023  Time Calculation  Start Time: 0930  Stop Time: 1015  Time Calculation (min): 45 min        Current Problem  1. Decreased range of motion of neck        2. Cervical post-laminectomy syndrome  Follow Up In Physical Therapy      3. Poor posture            Goals:  Active       PT Problem       PT Goal 1       Start:  11/06/23    Expected End:  12/18/23       The patient will score 5 points or less on Neck Disability Index in order to demonstrate improved sx level and improved ability to complete functional mobility tasks and ADLs/IADLs.           PT Goal 2       Start:  11/06/23    Expected End:  12/18/23       The patient will demonstrate improved cervical AROM (by at least 5-10 degrees) in order to improve ability to complete functional activities and mobility to complete ADLs/IADLs and daily activities          PT Goal 3       Start:  11/06/23    Expected End:  12/18/23       The patient will report decreased  (0-3/10) pain in neck during functional activities in order to demonstrate improved pain levels.           PT Goal 4       Start:  11/06/23    Expected End:  12/18/23       The patient will demonstrate 5/5 strength via MMT of UEs in order to improve ability to complete functional activities and mobility for ADLs/IADLs.           PT Goal 5       Start:  11/06/23    Expected End:  12/18/23       The patient will subjectively report compliance with recommended HEP in order to maximize functional gains during physical therapy plan of care.               Assessment:  Pt tolerated treatment well without complaint of increased pain after treatment today. Overall he feels that his pain is improving.  Pt may benefit from further PT to address impairments and to progress toward goals as tolerated.      Plan:   Assess patient's response to this session next visit and progress program as able and  "tolerated.        Subjective   Pt reports 1/10 left sided neck and scapular pain.  Pt feels that PT has been 'helping'. He has been able to drive without his left hand going numb.      Precautions  Precautions  STEADI Fall Risk Score (The score of 4 or more indicates an increased risk of falling): 6  PMH: hx of 2 fusions (notes C4-7 is fused), hx of basal cell cancer, hx of HD and stents in heart, HTN, hx of 2 TKAs    Treatments:  Seated:  Cervical flexion 2 x 10 reps, no radicular symptoms  B shoulder shrugs 1 x 10 reps   B shoulder circles BWD 2 x 10 reps   B scapular retraction with shoulder ER 2 x 10 reps  B shoulder ER with orange TB 2 x 10 reps  Thoracic rotation x 15 L/R    Supine with one pillow:  B shoulder flexion with wand 2 x 10 reps  Cervical retraction x 5  Pectoral stretch with hands behind head 3 x 30 seconds bilateral  B scapular retraction with UE in neutral 3-5\" hold 2 x 10 reps   B shoulder horizontal ABD with palms down and orange TB 2 x 10 reps  R/L UE PNF D2 flexion with orange TB 2 x 10 reps    Not completed today:  -Seated isometric cervical flexors 2 x 5 reps, 5 sec hold   -Seated isometric cervical SB to right  x 5 reps, 3 sec hold; increased discomfort across neck to right side of neck      OP EDUCATION:  Correct exercise technique. Completing HEP in pain free ROM only and holding any exercise that causes increased pain.  Pt verbalized understanding           "

## 2023-12-06 ENCOUNTER — TREATMENT (OUTPATIENT)
Dept: PHYSICAL THERAPY | Facility: CLINIC | Age: 73
End: 2023-12-06
Payer: MEDICARE

## 2023-12-06 DIAGNOSIS — R29.3 POOR POSTURE: Primary | ICD-10-CM

## 2023-12-06 DIAGNOSIS — R29.898 DECREASED RANGE OF MOTION OF NECK: ICD-10-CM

## 2023-12-06 DIAGNOSIS — M96.1 CERVICAL POST-LAMINECTOMY SYNDROME: ICD-10-CM

## 2023-12-06 PROCEDURE — 97140 MANUAL THERAPY 1/> REGIONS: CPT | Mod: GP | Performed by: PHYSICAL THERAPIST

## 2023-12-06 PROCEDURE — 97110 THERAPEUTIC EXERCISES: CPT | Mod: GP | Performed by: PHYSICAL THERAPIST

## 2023-12-06 NOTE — PROGRESS NOTES
Physical Therapy    Physical Therapy Treatment    Patient Name: Phuc Cheek  MRN: [id number]  Today's Date: 12/8/2023       10:05-10:45  Assessment: Therapist eliminated some exercises from treatment today due to patient complaints of an episode of left UE radicular pain yesterday after having a thyroid biopsy procedure where his neck was extended for a length of time.  The patient was able to flex neck slowly with tightness but less pain at the end of session today.  He left in no distress or increase in pain.  He was advised to use heat and avoid any pain provoking motions/exercises, contact doctor if persistent.     Plan: Resume full program as tolerated.        Current Problem  1. Poor posture        2. Cervical post-laminectomy syndrome  Follow Up In Physical Therapy      3. Decreased range of motion of neck            General          Subjective    Precautions: Patient reports that he had left UE pain that began around 10:00 p.m., after thyroid procedure where neck was placed in slight extension for what he believes was about 15 minutes or so. The pain has subsided today, but he is still sore in the left side of neck/shoulder. He denies any N/T, SOB, chest pain/pressure.          Pain: 2-3/10       Objective   Manual Therapy: Gentle STM to R/L cervical paraspinals, upper trapezius muscles. Patient was in sitting position.     Seated:  Cervical flexion  no radicular symptoms - attempted x 2 reps, but to painful, so ceased (Able to flex slowly without pain at end of session)  -B shoulder shrugs 1 x 10 reps   -B shoulder circles BWD 2 x 10 reps   -B scapular retraction without shoulder ER 2 x 10 reps  -B shoulder ER with orange TB 2 x 10 reps, small ROM  -Thoracic rotation x 10 L/R     Supine with one pillow:  Pectoral stretch with hands behind head 3 x 30 seconds bilateral      Not completed today:  -Seated isometric cervical flexors 2 x 5 reps, 5 sec hold   -Seated isometric cervical SB to right  x 5 reps,  "3 sec hold; increased discomfort across neck to right side of neck    -B shoulder flexion with wand 2 x 10 reps  -Cervical retraction x 5  -B scapular retraction with UE in neutral 3-5\" hold 2 x 10 reps   -B shoulder horizontal ABD with palms down and orange TB 2 x 10 reps  -R/L UE PNF D2 flexion with orange TB 2 x 10 reps    OP EDUCATION: Use heat to reduce muscle tightness, avoid pain-provoking movements/exercises.        Goals:  Active       PT Problem       PT Goal 1       Start:  11/06/23    Expected End:  12/18/23       The patient will score 5 points or less on Neck Disability Index in order to demonstrate improved sx level and improved ability to complete functional mobility tasks and ADLs/IADLs.           PT Goal 2       Start:  11/06/23    Expected End:  12/18/23       The patient will demonstrate improved cervical AROM (by at least 5-10 degrees) in order to improve ability to complete functional activities and mobility to complete ADLs/IADLs and daily activities          PT Goal 3       Start:  11/06/23    Expected End:  12/18/23       The patient will report decreased  (0-3/10) pain in neck during functional activities in order to demonstrate improved pain levels.           PT Goal 4       Start:  11/06/23    Expected End:  12/18/23       The patient will demonstrate 5/5 strength via MMT of UEs in order to improve ability to complete functional activities and mobility for ADLs/IADLs.           PT Goal 5       Start:  11/06/23    Expected End:  12/18/23       The patient will subjectively report compliance with recommended HEP in order to maximize functional gains during physical therapy plan of care.             "

## 2023-12-08 NOTE — PROGRESS NOTES
"Physical Therapy    Physical Therapy Treatment    Patient Name: Phuc Cheek  MRN: [id number]  Today's Date: 12/11/2023  Time Calculation  Start Time: 1003  Stop Time: 1048  Time Calculation (min): 45 min      Assessment:  Patient with TTP along upper thoracic paraspinals/medial L scapular region, with tightness in the above musculature noted during STM this date. Avoided R rotation and forward flexion of cervical spine this date during therapeutic exercises per patient reports of severely increased pain with these motions over the weekend. Patient noted no increased pain at end of session. Assess patient's response to this session next visit and progress as able and tolerated by patient.     Plan:   Assess patient's response to this session next visit and progress as able.     Current Problem  1. Decreased range of motion of neck        2. Cervical post-laminectomy syndrome  Follow Up In Physical Therapy      3. Poor posture            General     General  Reason for Referral: Cervical Post laminectomy syndrome  Referred By: Dr. Osvaldo Kuhn  Past Medical History Relevant to Rehab: hx of 2 fusion surgeries    Subjective    The patient reports he felt okay after last session (last Wednesday). But then was driving down to [place] on Thursday and started to have severe pain in middle of back and L shoulder blade. Notes no falls or ARVIND. Notes massage to shoulder blade and mid back helped with pain but then it came back when he drove home. Pt notes increased pain when looking to right side as well. Notes no N/T or pain down arms currently. Pt notes he has a history of thoracic disc herniations, but no fusions in thoracic spine.     Pain   Notes 8/10 pain along midback and neck. \"It'll go to 10 if I look down\" at start of session. Pt noted no increased pain at end of session, noted still 8/10 pulling pain in neck but noted no pain in L scapular region/upper thoracic region at end of session. No N/T or pain in UEs at " end of session.Patient left therapy in no apparent distress.       Treatments:  Therapeutic Exercise:   -Seated retro shoulder rolls 2 x 10 reps   -Seated scapular retraction 5 sec hold x 10 reps, slight tingling in L hand that then resolved within 30 sec of stopping exercise   -Small ROM thoracic extension with cervical spine in neutral in seated with small bolster horizontal behind back 2 x 5 reps; slight tingling in L hand that resolved quickly (pt notes he has no thoracic fusions)  -B shoulder shrugs 1 x 10 reps   -L shoulder/arm across body stretch (adduction) 3 x 20 sec, pt noted he felt a stretch   - L seated cervical SB 2 x 5 reps, 5 sec hold     Manual Therapy:   -Gentle STM to L UT, medial L scapular region. Tightness present in upper left thoracic paraspinals. Patient in seated with back support. Pt noted no change in pain following STM completed this date     OP EDUCATION:   Correct exercise technique. Patient educated to avoid any exercises at home that cause increased pain.     Goals:  Active       PT Problem       PT Goal 1       Start:  11/06/23    Expected End:  12/18/23       The patient will score 5 points or less on Neck Disability Index in order to demonstrate improved sx level and improved ability to complete functional mobility tasks and ADLs/IADLs.           PT Goal 2       Start:  11/06/23    Expected End:  12/18/23       The patient will demonstrate improved cervical AROM (by at least 5-10 degrees) in order to improve ability to complete functional activities and mobility to complete ADLs/IADLs and daily activities          PT Goal 3       Start:  11/06/23    Expected End:  12/18/23       The patient will report decreased  (0-3/10) pain in neck during functional activities in order to demonstrate improved pain levels.           PT Goal 4       Start:  11/06/23    Expected End:  12/18/23       The patient will demonstrate 5/5 strength via MMT of UEs in order to improve ability to complete  functional activities and mobility for ADLs/IADLs.           PT Goal 5       Start:  11/06/23    Expected End:  12/18/23       The patient will subjectively report compliance with recommended HEP in order to maximize functional gains during physical therapy plan of care.

## 2023-12-11 ENCOUNTER — TREATMENT (OUTPATIENT)
Dept: PHYSICAL THERAPY | Facility: CLINIC | Age: 73
End: 2023-12-11
Payer: MEDICARE

## 2023-12-11 DIAGNOSIS — R29.898 DECREASED RANGE OF MOTION OF NECK: Primary | ICD-10-CM

## 2023-12-11 DIAGNOSIS — R29.3 POOR POSTURE: ICD-10-CM

## 2023-12-11 DIAGNOSIS — M96.1 CERVICAL POST-LAMINECTOMY SYNDROME: ICD-10-CM

## 2023-12-11 PROCEDURE — 97110 THERAPEUTIC EXERCISES: CPT | Mod: GP

## 2023-12-11 PROCEDURE — 97010 HOT OR COLD PACKS THERAPY: CPT | Mod: GP

## 2023-12-13 ENCOUNTER — TREATMENT (OUTPATIENT)
Dept: PHYSICAL THERAPY | Facility: CLINIC | Age: 73
End: 2023-12-13
Payer: MEDICARE

## 2023-12-13 DIAGNOSIS — R29.3 POOR POSTURE: ICD-10-CM

## 2023-12-13 DIAGNOSIS — R29.898 DECREASED RANGE OF MOTION OF NECK: Primary | ICD-10-CM

## 2023-12-13 DIAGNOSIS — M96.1 CERVICAL POST-LAMINECTOMY SYNDROME: ICD-10-CM

## 2023-12-13 PROCEDURE — 97010 HOT OR COLD PACKS THERAPY: CPT | Mod: GP

## 2023-12-13 PROCEDURE — 97110 THERAPEUTIC EXERCISES: CPT | Mod: GP

## 2023-12-13 NOTE — PROGRESS NOTES
"Physical Therapy    Physical Therapy Treatment    Patient Name: Phuc Cheek  MRN: [id number]  Today's Date: 12/13/2023  Time Calculation  Start Time: 1019  Stop Time: 1103  Time Calculation (min): 44 min      Assessment:   Patient noted no lasting increased pain in neck during session this date, but did note increased pain in mid back when completing cervical retraction (pt noted pain resolved by end of session). Patient continues to be very limited in B cervical side bending, but was able to complete exercise without reports of increased pain or N/T. Plan to complete recheck next visit.    Plan:   Plan to complete recheck next visit and proceed as appropriate.     Current Problem  1. Decreased range of motion of neck        2. Cervical post-laminectomy syndrome  Follow Up In Physical Therapy      3. Poor posture            General     General  Reason for Referral: Cervical Post laminectomy syndrome  Referred By: Dr. Osvaldo Kuhn  Past Medical History Relevant to Rehab: hx of 2 fusion surgeries    Subjective    The patient reports his mid back is feeling a little better today. Notes his neck is still hurting him. Noticed a tingling sensation, \"it feels like after I shave my face and put after shave on and it feels cooling.\" Pt notes tingling lasts 1-2 min and subsides quickly. Has been going on for about a week. Notes no other new sx, weakness, etc. Still has pain in neck when looking down. Notes he felt okay after last session. Feels like therapy has helped with tingling in L arm, loosened neck. Notes it is a day to day thing, feels tight right now. But notes it could feel looser during the day.     Pain   Pt notes 2-3/10 pain along back of B sides of neck. Notes mid back pain is gone. Pt noted no N/T in LUE, face, or neck at end of session.  Pt noted no pain in mid back at end of session. Pt left therapy in no distress.       Treatments:  Therapeutic Exercise:  -B retro shoulder rolls x 30 reps   -B shoulder " shrugs 2 x 10 rep (pt noted tingling across superior portion of L scapula that resolved once he stopped exercise)   -Cervical retraction 2 x 10 reps; pt noted he felt stretch on right side of neck (posterior); pt noted increased pain in mid back region on left side   -Seated posterior shoulder rolls x 20 reps   -Seated LUE stretch across body 10 sec hold x 8 reps; pt noted pulling and tingling across L superior scapula   -Seated R/L cervical sidebend 2 x 10 reps; pt noted stretching     Modalities:   -Seated MHP to L side of neck/scapula x 10 min at end of session to address pt reports of tightness     OP EDUCATION:   Patient educated on POC and plan to complete recheck next visit with probable discharge to follow up with referring physician due to continued reports of pain with minimal improvement noted. Patient educated to complete HEP in pain free ROM only and to hold any exercises that cause increased pain. Pt educated to contact doctor/go to ED if he has weakness or constant N/T that does not resolve in face. Pt reported understanding.     Goals:  Active       PT Problem       PT Goal 1       Start:  11/06/23    Expected End:  12/18/23       The patient will score 5 points or less on Neck Disability Index in order to demonstrate improved sx level and improved ability to complete functional mobility tasks and ADLs/IADLs.           PT Goal 2       Start:  11/06/23    Expected End:  12/18/23       The patient will demonstrate improved cervical AROM (by at least 5-10 degrees) in order to improve ability to complete functional activities and mobility to complete ADLs/IADLs and daily activities          PT Goal 3       Start:  11/06/23    Expected End:  12/18/23       The patient will report decreased  (0-3/10) pain in neck during functional activities in order to demonstrate improved pain levels.           PT Goal 4       Start:  11/06/23    Expected End:  12/18/23       The patient will demonstrate 5/5 strength via  MMT of UEs in order to improve ability to complete functional activities and mobility for ADLs/IADLs.           PT Goal 5       Start:  11/06/23    Expected End:  12/18/23       The patient will subjectively report compliance with recommended HEP in order to maximize functional gains during physical therapy plan of care.

## 2023-12-18 ENCOUNTER — TREATMENT (OUTPATIENT)
Dept: PHYSICAL THERAPY | Facility: CLINIC | Age: 73
End: 2023-12-18
Payer: MEDICARE

## 2023-12-18 DIAGNOSIS — M96.1 CERVICAL POST-LAMINECTOMY SYNDROME: ICD-10-CM

## 2023-12-18 DIAGNOSIS — R29.898 DECREASED RANGE OF MOTION OF NECK: Primary | ICD-10-CM

## 2023-12-18 DIAGNOSIS — R29.3 POOR POSTURE: ICD-10-CM

## 2023-12-18 PROCEDURE — 97530 THERAPEUTIC ACTIVITIES: CPT | Mod: GP

## 2023-12-18 NOTE — PROGRESS NOTES
"Physical Therapy    Physical Therapy Treatment/Re-Check/Discharge Summary    Patient Name: Phuc Cheek  MRN: [id number]  Today's Date: 12/18/2023  Time Calculation  Start Time: 1010  Stop Time: 1031  Time Calculation (min): 21 min      Assessment:  PT Assessment  PT Assessment Results: Decreased strength, Decreased range of motion  Rehab Prognosis: Fair  The patient is a 72 y/o male who has attended 10 sessions of skilled physical therapy to address neck pain and radicular sx into LUE.  The patient subjectively reports compliance with HEP. The patient demonstrated slight improvements with regard to UE strength and  strength, and noted decreased LUE pain/symptoms since starting therapy. The patient continues to demonstrate impairments with regard to neck AROM, UE strength per MMT, tightness in cervical musculature, and patient reports of pain in neck that inhibit his ability to complete ADLs/IADLs. At this time, the patient is appropriate to be discharged from therapy and is being referred back to referring provider due to continued reports of pain in neck that have been unchanged over the last several visits. Patient reports understanding and is in agreement with POC.      Plan:  OP PT Plan  PT Plan: No Additional PT interventions required at this time  Plan of Care Agreement: Patient    Current Problem  1. Decreased range of motion of neck        2. Poor posture        3. Cervical post-laminectomy syndrome  Follow Up In Physical Therapy          General       General  Reason for Referral: Cervical Post laminectomy syndrome  Referred By: Dr. Osvaldo Kuhn  Past Medical History Relevant to Rehab: hx of 2 fusion surgeries    Subjective    The patient reports he had a lot of neck pain on Saturday night, notes he had pain when laying on right side and on back. No trouble with laying on left side. Notes that since starting therapy, his L arm symptoms seem to be better. But notes he now has \"weird sensation, " "creepy crawly\" on left side of face that then resolves quickly and is not constant (not present during session). Notes he has a follow up with referring pain management physician next month. Notes pain currently is only on left side of neck, nothing on right side or down into shoulder blade. Notes the pain in his neck is limiting him from doing work around his home. Notes he is doing shoulder rolls, scapular retraction, supine pec stretch and they typically do not increase pain, but pt notes they did yesterday.      Pain   2/10 when walking into therapy. Notes 5/10 pain after sitting down and filling out NDI. Pt noted no increased pain at end of session and left therapy in no distress.     Objective   PT messaged referring provider Dr. Kuhn via SecureChat to make him aware of plan to discharge patient from PT and refer back to Dr. Kuhn due to continued symptoms.      Strength:   R: 61#   L: 64#   Patient is right handed    Observation  Cervical Posture: Forward head, B rounded shoulders posturing  C-Spine Palpation/Joint Mobility Assessment   C-Spine Palpation/Joint Mobility Assessment:: TTP noted along L levator scapula region. Tightness palpable in B UT. No TTP noted along B cervical paraspinals  Cervical AROM  Cervical flexion: (80°): 38 P! worse than ext  Cervical extension: (50°): 20 P!  Cervical roatation right: (80°): 29 P!  Cervical rotation left: (80°): 32 P!  Cervical sidebend right: (45°): 20 P!  Cervical sidebend left: (45°): 28  Shoulder AROM  Shoulder AROM WFL: yes    Myotomes (MMT)  R Scapular Elevation (C4 ): (5/5): 4+  L Scapular Elevation (C4 ): (5/5): 5 (P!)  R Shoulder Abduction (C5 ): (5/5): 5  L Shoulder Abduction (C5 ): (5/5): 5 (P!)  R Elbow Flexion (C6 ): (5/5): 5  L Elbow Flexion (C6 ): (5/5): 5  R Elbow Extension (C7 ): (5/5): 4  L Elbow Extension (C7 ): (5/5): 4+  R/L ER at side: 4+/5  R/L IR at side: 4/5    Shoulder AROM  Shoulder AROM WFL: yes  Outcome Measures:  Neck Disability " "Index: 10 points   Treatments:  Therapeutic Activity:   -Re-check completed this date. Please see goals and objective for details. Patient educated on outcomes of re-check and POC going forward.    -Pt educated to return to referring provider for follow-up. Pt educated to continue completing HEP only in pain free ROM and to hold exercises if they cause increased pain. Pt educated to seek care sooner if peripheral sx (\"weird sensation on L side of face) increase. Pt reported understanding.   OP EDUCATION:  Outpatient Education  Individual(s) Educated: Patient  Education Provided: POC  Risk and Benefits Discussed with Patient/Caregiver/Other: yes  Patient/Caregiver Demonstrated Understanding: yes  Plan of Care Discussed and Agreed Upon: yes  Patient Response to Education: Patient/Caregiver Verbalized Understanding of Information    Goals:  Resolved       PT Problem       PT Goal 1 (Not met)       Start:  11/06/23    Expected End:  12/18/23    Resolved:  12/18/23    The patient will score 5 points or less on Neck Disability Index in order to demonstrate improved sx level and improved ability to complete functional mobility tasks and ADLs/IADLs.           PT Goal 2 (Not met)       Start:  11/06/23    Expected End:  12/18/23    Resolved:  12/18/23    The patient will demonstrate improved cervical AROM (by at least 5-10 degrees) in order to improve ability to complete functional activities and mobility to complete ADLs/IADLs and daily activities          PT Goal 3 (Adequate for Discharge)       Start:  11/06/23    Expected End:  12/18/23       The patient will report decreased  (0-3/10) pain in neck during functional activities in order to demonstrate improved pain levels.           PT Goal 4 (Not met)       Start:  11/06/23    Expected End:  12/18/23    Resolved:  12/18/23    The patient will demonstrate 5/5 strength via MMT of UEs in order to improve ability to complete functional activities and mobility for ADLs/IADLs. "           PT Goal 5 (Met)       Start:  11/06/23    Expected End:  12/18/23    Resolved:  12/18/23    The patient will subjectively report compliance with recommended HEP in order to maximize functional gains during physical therapy plan of care.

## 2023-12-20 ENCOUNTER — APPOINTMENT (OUTPATIENT)
Dept: PHYSICAL THERAPY | Facility: CLINIC | Age: 73
End: 2023-12-20
Payer: MEDICARE

## 2023-12-27 ENCOUNTER — APPOINTMENT (OUTPATIENT)
Dept: PHYSICAL THERAPY | Facility: CLINIC | Age: 73
End: 2023-12-27
Payer: MEDICARE

## 2024-01-09 ENCOUNTER — OFFICE VISIT (OUTPATIENT)
Dept: PAIN MEDICINE | Facility: CLINIC | Age: 74
End: 2024-01-09
Payer: MEDICARE

## 2024-01-09 VITALS — RESPIRATION RATE: 18 BRPM | DIASTOLIC BLOOD PRESSURE: 84 MMHG | HEART RATE: 62 BPM | SYSTOLIC BLOOD PRESSURE: 139 MMHG

## 2024-01-09 DIAGNOSIS — R20.0 LEFT FACIAL NUMBNESS: ICD-10-CM

## 2024-01-09 DIAGNOSIS — M96.1 CERVICAL POST-LAMINECTOMY SYNDROME: ICD-10-CM

## 2024-01-09 DIAGNOSIS — F11.90 CHRONIC, CONTINUOUS USE OF OPIOIDS: ICD-10-CM

## 2024-01-09 PROCEDURE — 99213 OFFICE O/P EST LOW 20 MIN: CPT | Performed by: ANESTHESIOLOGY

## 2024-01-09 PROCEDURE — 3075F SYST BP GE 130 - 139MM HG: CPT | Performed by: ANESTHESIOLOGY

## 2024-01-09 PROCEDURE — 3079F DIAST BP 80-89 MM HG: CPT | Performed by: ANESTHESIOLOGY

## 2024-01-09 PROCEDURE — 1036F TOBACCO NON-USER: CPT | Performed by: ANESTHESIOLOGY

## 2024-01-09 PROCEDURE — 1159F MED LIST DOCD IN RCRD: CPT | Performed by: ANESTHESIOLOGY

## 2024-01-09 PROCEDURE — 1125F AMNT PAIN NOTED PAIN PRSNT: CPT | Performed by: ANESTHESIOLOGY

## 2024-01-09 RX ORDER — OXYCODONE AND ACETAMINOPHEN 10; 325 MG/1; MG/1
1 TABLET ORAL 3 TIMES DAILY PRN
Qty: 84 TABLET | Refills: 0 | Status: SHIPPED | OUTPATIENT
Start: 2024-03-08 | End: 2024-04-01 | Stop reason: SDUPTHER

## 2024-01-09 RX ORDER — NALOXONE HYDROCHLORIDE 4 MG/.1ML
4 SPRAY NASAL AS NEEDED
Qty: 2 EACH | Refills: 0 | Status: SHIPPED | OUTPATIENT
Start: 2024-01-09 | End: 2025-01-08

## 2024-01-09 RX ORDER — METHOCARBAMOL 750 MG/1
750 TABLET, FILM COATED ORAL 3 TIMES DAILY PRN
Qty: 90 TABLET | Refills: 2 | Status: SHIPPED | OUTPATIENT
Start: 2024-01-09 | End: 2024-04-01 | Stop reason: SDUPTHER

## 2024-01-09 RX ORDER — OXYCODONE AND ACETAMINOPHEN 10; 325 MG/1; MG/1
1 TABLET ORAL 3 TIMES DAILY PRN
Qty: 84 TABLET | Refills: 0 | Status: SHIPPED | OUTPATIENT
Start: 2024-02-09 | End: 2024-04-01 | Stop reason: SDUPTHER

## 2024-01-09 RX ORDER — OXYCODONE AND ACETAMINOPHEN 10; 325 MG/1; MG/1
1 TABLET ORAL 3 TIMES DAILY PRN
Qty: 84 TABLET | Refills: 0 | Status: SHIPPED | OUTPATIENT
Start: 2024-01-12 | End: 2024-02-19 | Stop reason: SDUPTHER

## 2024-01-09 ASSESSMENT — PAIN SCALES - GENERAL
PAINLEVEL_OUTOF10: 4
PAINLEVEL: 4

## 2024-01-09 ASSESSMENT — ENCOUNTER SYMPTOMS
LOSS OF SENSATION IN FEET: 0
OCCASIONAL FEELINGS OF UNSTEADINESS: 0
DEPRESSION: 0

## 2024-01-09 ASSESSMENT — LIFESTYLE VARIABLES: TOTAL SCORE: 1

## 2024-01-09 ASSESSMENT — PAIN DESCRIPTION - DESCRIPTORS: DESCRIPTORS: SHARP;SHOOTING

## 2024-01-09 ASSESSMENT — PAIN - FUNCTIONAL ASSESSMENT: PAIN_FUNCTIONAL_ASSESSMENT: 0-10

## 2024-01-09 NOTE — PROGRESS NOTES
History Of Present Illness  Phuc Cheek is a 73 y.o. male presenting with neck pain.  Patient sustained a fall in September 2023 that exacerbated his neck pain right greater than left.  Patient was referred for physical therapy for which she reported good relief of his neck pain until recently when he started noticing tingling and numbness on the left side of his face.  No history of any numbness or weakness in the upper extremity no history of any amaurosis fugax or blurred vision.  No history of any slurred speech or imbalance.  Current pain medications include oxycodone 10 mg 3 times daily and methocarbamol 750 mg once at bedtime.  Patient reports 80% relief with the current regimen.  Patient was scheduled for a left C3-C4 cervical facet joint injection however patient declined from doing the procedure.  Past medical history significant for cervical spine fusion  .     Past Medical History  He has a past medical history of Arthritis (Unknown), Basal cell carcinoma of skin, unspecified, CHF (congestive heart failure) (CMS/[facility]) (2014), Diverticulosis of intestine, part unspecified, without perforation or abscess without bleeding, Eczema (04/11/2023), Heart disease (1999), HLD (hyperlipidemia) (04/11/2023), Hypertension (1999), Myocardial infarction (CMS/[facility]) (11/5/2014), Noninfective gastroenteritis and colitis, unspecified, Other abnormal glucose, Personal history of (healed) traumatic fracture, Personal history of other diseases of the circulatory system, Personal history of other diseases of the digestive system, Personal history of other diseases of the digestive system, Personal history of other diseases of the digestive system, Personal history of other diseases of the musculoskeletal system and connective tissue, Personal history of other diseases of the nervous system and sense organs, Personal history of other diseases of the respiratory system, Personal history of other medical treatment, Personal  history of other specified conditions, Personal history of urinary calculi, Restless legs syndrome, Sore throat (04/11/2023), Thrush, oral (04/11/2023), Umbilical hernia without obstruction or gangrene, Unspecified atherosclerosis, and Varicella (unknown).    Surgical History  He has a past surgical history that includes Tonsillectomy (07/22/2016); Cervical fusion (07/22/2016); Hand tendon surgery (07/22/2016); Knee surgery (07/22/2016); Other surgical history (07/22/2016); Coronary artery bypass graft (07/22/2016); Other surgical history (10/08/2021); Other surgical history (02/17/2017); Esophagogastroduodenoscopy (02/17/2017); Splenectomy, total (02/17/2017); Colonoscopy (02/17/2017); Other surgical history (02/17/2017); Other surgical history (02/17/2017); Other surgical history (02/17/2017); Hemorrhoid surgery (02/17/2017); Appendectomy (02/17/2017); Other surgical history (02/17/2017); Other surgical history (02/17/2017); Other surgical history (02/17/2017); Hernia repair (02/17/2017); MR angio head wo IV contrast (05/28/2016); MR angio neck wo IV contrast (05/28/2016); MR angio head wo IV contrast (05/03/2018); MR angio neck wo IV contrast (05/03/2018); MR angio head wo IV contrast (05/03/2018); Coronary stent placement (1999,2000,2014); Joint replacement (4/29/2014 &3/14/2022 knees); Sinus surgery (10/29/2014); Spine surgery (1993 & 2018 neck fusion); and Cardiac catheterization (1999,2000).     Social History  He reports that he has never smoked. He has never used smokeless tobacco. He reports current alcohol use of about 1.0 - 2.0 standard drink of alcohol per week. He reports that he does not use drugs.    Family History  Family History   Problem Relation Name Age of Onset    Cancer Mother MOM         breast    Heart disease Mother MOM     Breast cancer Mother MOM     Arthritis Mother MOM     Hyperlipidemia Mother MOM     Hypertension Mother MOM     Heart attack Mother MOM     Arthritis Father DAD      Other (bladder cancer) Father DAD     Diabetes Father DAD     Heart disease Father DAD     Other (cabg) Father DAD     Hypertension Father DAD     Heart attack Father DAD     Skin cancer Father DAD     Diabetes Sister Sister     Arthritis Sister Sister     Goiter Sister Sister     Hyperlipidemia Sister Sister     Heart disease Sister Sister only     Accidental death Brother Brother     Arthritis Brother Brother     Hyperlipidemia Brother Brother     Heart disease Brother Brother only         Allergies  Adhesive tape-silicones, Bee venom protein (honey bee), Niacin, Simvastatin, and Tetracyclines    Review of systems:   13-point review of systems done and negative except as noted in HPI      Physical Exam   Pertinent findings: Limitation of motion with flexion extension and lateral rotation.  Tenderness on palpation C3-C4 cervical facet bilaterally.  Cranial nerve examination 1 through 12 was negative.  Vital signs: Reviewed  Constitutional: No acute distress, well appearing and well nourished. Patient appears stated age.   Eyes: Conjunctiva non-icteric and eye lids are without obvious rash or drooping. Pupils are symmetric.   Ears, Nose, Mouth, and Throat: External ears and nose appear to be without deformity or rash. No lesions or masses noted. Hearing is grossly intact.   Neck:. No JVD noted, tracheal position is midline.   Head and Face: Examination of the head and face revealed no abnormalities.   Respiratory: No gasping or shortness of breath noted, no use of accessory muscles noted.   Cardiovascular: Examination for edema is normal.   GI: Abdomen nontender to palpation.   Skin: No rashes or open lesions/ulcers identified on skin.   Musk: Digits/nails show no clubbing or cyanosis. No asymmetry or masses noted of the musculature. Examination of the muscles/joints/bones show normal range of motion. Gait is grossly normal.  Able to walk on toes and heels.   Neurologic: Cranial nerves II-XII intact, motor strength  5/5 muscle strength of the lower extremities bilaterally and equal. 5/5 muscle strength of the upper extremities bilaterally and equal.   Reflexes: normal.   Sensation: Normal to touch and pinprick in upper extremities bilaterally  Provocative tests: Negative Doris bilaterally       Last Recorded Vitals  /84   Pulse 62   Resp 18 Comment: 95%    Relevant Results            Last OARRS Review: No data recorded    I have personally reviewed the OARRS report for Phuc Cheek I have considered the risks of abuse, dependence, addiction and diversion       Assessment/Plan   Diagnoses and all orders for this visit:  Cervical post-laminectomy syndrome  -     oxyCODONE-acetaminophen (Percocet)  mg tablet; Take 1 tablet by mouth 3 times a day as needed for severe pain (7 - 10). Do not start before January 12, 2024.  -     oxyCODONE-acetaminophen (Percocet)  mg tablet; Take 1 tablet by mouth 3 times a day as needed for severe pain (7 - 10). Do not start before February 9, 2024.  -     oxyCODONE-acetaminophen (Percocet)  mg tablet; Take 1 tablet by mouth 3 times a day as needed for severe pain (7 - 10). Do not start before March 8, 2024.  -     methocarbamol (Robaxin) 750 mg tablet; Take 1 tablet (750 mg) by mouth 3 times a day as needed for muscle spasms.  Chronic, continuous use of opioids  -     naloxone (Narcan) 4 mg/0.1 mL nasal spray; Administer 1 spray (4 mg) into affected nostril(s) if needed for opioid reversal. May repeat every 2-3 minutes if needed, alternating nostrils, until medical assistance becomes available.  Left facial numbness  -     Vascular US carotid artery duplex bilateral; Future      I discussed with the patient the concern of having left sided facial numbness and recommended that he follows up with his neurologist.  I also recommended doing a ultrasound of the carotids bilaterally to rule out any stenosis.  Patient was agreeable with the plan and would like to  proceed.       Osvaldo Kuhn MD

## 2024-01-11 ENCOUNTER — HOSPITAL ENCOUNTER (OUTPATIENT)
Dept: VASCULAR MEDICINE | Facility: HOSPITAL | Age: 74
Discharge: HOME | End: 2024-01-11
Payer: MEDICARE

## 2024-01-11 DIAGNOSIS — R20.0 LEFT FACIAL NUMBNESS: ICD-10-CM

## 2024-01-11 PROCEDURE — 93880 EXTRACRANIAL BILAT STUDY: CPT

## 2024-01-11 PROCEDURE — 93880 EXTRACRANIAL BILAT STUDY: CPT | Performed by: SURGERY

## 2024-01-22 ENCOUNTER — TELEPHONE (OUTPATIENT)
Dept: PRIMARY CARE | Facility: CLINIC | Age: 74
End: 2024-01-22
Payer: MEDICARE

## 2024-01-22 DIAGNOSIS — E78.5 HYPERLIPIDEMIA, UNSPECIFIED HYPERLIPIDEMIA TYPE: ICD-10-CM

## 2024-01-22 DIAGNOSIS — I10 PRIMARY HYPERTENSION: ICD-10-CM

## 2024-01-22 DIAGNOSIS — I25.10 CORONARY ARTERY DISEASE INVOLVING NATIVE CORONARY ARTERY OF NATIVE HEART WITHOUT ANGINA PECTORIS: ICD-10-CM

## 2024-01-22 DIAGNOSIS — I10 HYPERTENSION, UNSPECIFIED TYPE: ICD-10-CM

## 2024-01-22 DIAGNOSIS — I25.10 CVD (CARDIOVASCULAR DISEASE): ICD-10-CM

## 2024-01-22 NOTE — TELEPHONE ENCOUNTER
Rx Refill Request Telephone Encounter    Name:  Phuc Cheek  :  [id number]  Medication Name:    EXETIMIBE 10MG Q/D 90 DAY   PRAVASTATIN 10MG TID   ATENOLOL 25MG Q/D   CLOPIDOGREL 75MG Q/D   Specific Pharmacy location:  /  Date of last appointment:  10/09/2023  Date of next appointment:  2024  Best number to reach patient:  [phone]

## 2024-01-24 RX ORDER — PRAVASTATIN SODIUM 10 MG/1
TABLET ORAL
Qty: 39 TABLET | Refills: 0 | Status: SHIPPED | OUTPATIENT
Start: 2024-01-24 | End: 2024-02-19 | Stop reason: SDUPTHER

## 2024-01-24 RX ORDER — ATENOLOL 25 MG/1
25 TABLET ORAL DAILY
Qty: 90 TABLET | Refills: 0 | Status: SHIPPED | OUTPATIENT
Start: 2024-01-24 | End: 2024-05-03

## 2024-01-24 RX ORDER — CLOPIDOGREL BISULFATE 75 MG/1
75 TABLET ORAL DAILY
Qty: 90 TABLET | Refills: 0 | Status: SHIPPED | OUTPATIENT
Start: 2024-01-24 | End: 2024-05-03

## 2024-01-24 RX ORDER — EZETIMIBE 10 MG/1
10 TABLET ORAL DAILY
Qty: 90 TABLET | Refills: 0 | Status: SHIPPED | OUTPATIENT
Start: 2024-01-24 | End: 2024-05-03

## 2024-02-16 PROBLEM — B34.1 COXSACKIE VIRUSES: Status: RESOLVED | Noted: 2023-04-11 | Resolved: 2024-02-16

## 2024-02-16 PROBLEM — K42.9 UMBILICAL HERNIA: Status: ACTIVE | Noted: 2024-02-16

## 2024-02-16 PROBLEM — S76.019A STRAIN OF FLEXOR MUSCLE OF HIP: Status: ACTIVE | Noted: 2024-02-16

## 2024-02-16 PROBLEM — R21 RASH AND OTHER NONSPECIFIC SKIN ERUPTION: Status: ACTIVE | Noted: 2023-09-10

## 2024-02-16 PROBLEM — J20.9 ACUTE BRONCHITIS: Status: RESOLVED | Noted: 2024-02-16 | Resolved: 2024-02-16

## 2024-02-16 PROBLEM — K57.90 DIVERTICULAR DISEASE: Status: ACTIVE | Noted: 2024-02-16

## 2024-02-16 PROBLEM — K52.9 COLITIS: Status: RESOLVED | Noted: 2024-02-16 | Resolved: 2024-02-16

## 2024-02-16 PROBLEM — C44.91 MALIGNANT BASAL CELL NEOPLASM OF SKIN: Status: ACTIVE | Noted: 2024-02-16

## 2024-02-16 PROBLEM — Z86.79 HISTORY OF CARDIOVASCULAR DISORDER: Status: ACTIVE | Noted: 2024-02-16

## 2024-02-16 PROBLEM — F11.20 CONTINUOUS OPIOID DEPENDENCE (MULTI): Status: ACTIVE | Noted: 2024-02-16

## 2024-02-16 PROBLEM — L03.119 CELLULITIS OF LOWER EXTREMITY: Status: RESOLVED | Noted: 2024-02-16 | Resolved: 2024-02-16

## 2024-02-16 PROBLEM — M99.79 NARROWING OF INTERVERTEBRAL DISC SPACE: Status: ACTIVE | Noted: 2022-11-22

## 2024-02-16 PROBLEM — R10.31 RIGHT LOWER QUADRANT ABDOMINAL PAIN: Status: RESOLVED | Noted: 2023-02-24 | Resolved: 2024-02-16

## 2024-02-16 PROBLEM — E55.9 VITAMIN D DEFICIENCY: Status: ACTIVE | Noted: 2024-02-16

## 2024-02-16 PROBLEM — E78.00 ELEVATED LOW DENSITY LIPOPROTEIN (LDL) CHOLESTEROL LEVEL: Status: ACTIVE | Noted: 2024-02-16

## 2024-02-16 PROBLEM — E87.6 HYPOKALEMIA: Status: ACTIVE | Noted: 2024-02-16

## 2024-02-16 PROBLEM — M12.569 TRAUMATIC ARTHROPATHY OF KNEE: Status: ACTIVE | Noted: 2024-02-16

## 2024-02-16 PROBLEM — S00.03XA SCALP HEMATOMA: Status: RESOLVED | Noted: 2024-02-16 | Resolved: 2024-02-16

## 2024-02-16 PROBLEM — S01.01XA LACERATION OF SCALP: Status: RESOLVED | Noted: 2023-10-09 | Resolved: 2024-02-16

## 2024-02-16 PROBLEM — G45.4 TRANSIENT GLOBAL AMNESIA: Status: ACTIVE | Noted: 2024-02-16

## 2024-02-16 PROBLEM — B88.0: Status: ACTIVE | Noted: 2024-02-16

## 2024-02-16 PROBLEM — M25.569 KNEE PAIN: Status: ACTIVE | Noted: 2024-02-16

## 2024-02-16 PROBLEM — R31.9 HEMATURIA: Status: RESOLVED | Noted: 2024-02-16 | Resolved: 2024-02-16

## 2024-02-16 PROBLEM — U07.1 DISEASE DUE TO SEVERE ACUTE RESPIRATORY SYNDROME CORONAVIRUS 2 (SARS-COV-2): Status: RESOLVED | Noted: 2023-04-11 | Resolved: 2024-02-16

## 2024-02-16 PROBLEM — G25.81 RESTLESS LEGS SYNDROME: Status: ACTIVE | Noted: 2024-02-16

## 2024-02-16 PROBLEM — S83.289A TEAR OF LATERAL MENISCUS OF KNEE: Status: ACTIVE | Noted: 2024-02-16

## 2024-02-16 PROBLEM — M79.659 PAIN OF THIGH: Status: ACTIVE | Noted: 2024-02-16

## 2024-02-16 PROBLEM — M77.40 METATARSALGIA: Status: ACTIVE | Noted: 2024-02-16

## 2024-02-16 PROBLEM — R20.0 NUMBNESS OF FACE: Status: ACTIVE | Noted: 2024-02-16

## 2024-02-19 ENCOUNTER — APPOINTMENT (OUTPATIENT)
Dept: PRIMARY CARE | Facility: CLINIC | Age: 74
End: 2024-02-19
Payer: MEDICARE

## 2024-02-19 ENCOUNTER — LAB (OUTPATIENT)
Dept: LAB | Facility: LAB | Age: 74
End: 2024-02-19
Payer: MEDICARE

## 2024-02-19 ENCOUNTER — OFFICE VISIT (OUTPATIENT)
Dept: PRIMARY CARE | Facility: CLINIC | Age: 74
End: 2024-02-19
Payer: MEDICARE

## 2024-02-19 VITALS
HEART RATE: 62 BPM | OXYGEN SATURATION: 95 % | SYSTOLIC BLOOD PRESSURE: 114 MMHG | HEIGHT: 70 IN | WEIGHT: 250.13 LBS | DIASTOLIC BLOOD PRESSURE: 70 MMHG | BODY MASS INDEX: 35.81 KG/M2

## 2024-02-19 DIAGNOSIS — E78.5 HYPERLIPIDEMIA, UNSPECIFIED HYPERLIPIDEMIA TYPE: ICD-10-CM

## 2024-02-19 DIAGNOSIS — I10 HYPERTENSION, UNSPECIFIED TYPE: Primary | ICD-10-CM

## 2024-02-19 DIAGNOSIS — I25.10 CVD (CARDIOVASCULAR DISEASE): ICD-10-CM

## 2024-02-19 DIAGNOSIS — I10 HYPERTENSION, UNSPECIFIED TYPE: ICD-10-CM

## 2024-02-19 LAB
ANION GAP SERPL CALC-SCNC: 12 MMOL/L (ref 10–20)
BUN SERPL-MCNC: 14 MG/DL (ref 6–23)
CALCIUM SERPL-MCNC: 9.5 MG/DL (ref 8.6–10.3)
CHLORIDE SERPL-SCNC: 103 MMOL/L (ref 98–107)
CO2 SERPL-SCNC: 30 MMOL/L (ref 21–32)
CREAT SERPL-MCNC: 0.88 MG/DL (ref 0.5–1.3)
EGFRCR SERPLBLD CKD-EPI 2021: 90 ML/MIN/1.73M*2
GLUCOSE SERPL-MCNC: 59 MG/DL (ref 74–99)
POTASSIUM SERPL-SCNC: 5.1 MMOL/L (ref 3.5–5.3)
SODIUM SERPL-SCNC: 140 MMOL/L (ref 136–145)

## 2024-02-19 PROCEDURE — 1159F MED LIST DOCD IN RCRD: CPT | Performed by: FAMILY MEDICINE

## 2024-02-19 PROCEDURE — 99214 OFFICE O/P EST MOD 30 MIN: CPT | Performed by: FAMILY MEDICINE

## 2024-02-19 PROCEDURE — 36415 COLL VENOUS BLD VENIPUNCTURE: CPT

## 2024-02-19 PROCEDURE — 3074F SYST BP LT 130 MM HG: CPT | Performed by: FAMILY MEDICINE

## 2024-02-19 PROCEDURE — 3078F DIAST BP <80 MM HG: CPT | Performed by: FAMILY MEDICINE

## 2024-02-19 PROCEDURE — 1036F TOBACCO NON-USER: CPT | Performed by: FAMILY MEDICINE

## 2024-02-19 PROCEDURE — 80048 BASIC METABOLIC PNL TOTAL CA: CPT

## 2024-02-19 PROCEDURE — 1125F AMNT PAIN NOTED PAIN PRSNT: CPT | Performed by: FAMILY MEDICINE

## 2024-02-19 RX ORDER — FUROSEMIDE 20 MG/1
TABLET ORAL
Qty: 39 TABLET | Refills: 1 | Status: SHIPPED | OUTPATIENT
Start: 2024-02-19

## 2024-02-19 RX ORDER — PRAVASTATIN SODIUM 10 MG/1
TABLET ORAL
Qty: 39 TABLET | Refills: 1 | Status: SHIPPED | OUTPATIENT
Start: 2024-02-19 | End: 2024-05-03

## 2024-02-19 RX ORDER — POTASSIUM CHLORIDE 750 MG/1
TABLET, FILM COATED, EXTENDED RELEASE ORAL
Qty: 78 TABLET | Refills: 1 | Status: SHIPPED | OUTPATIENT
Start: 2024-02-19

## 2024-02-19 ASSESSMENT — PATIENT HEALTH QUESTIONNAIRE - PHQ9
2. FEELING DOWN, DEPRESSED OR HOPELESS: NOT AT ALL
1. LITTLE INTEREST OR PLEASURE IN DOING THINGS: NOT AT ALL
SUM OF ALL RESPONSES TO PHQ9 QUESTIONS 1 AND 2: 0

## 2024-02-19 ASSESSMENT — ENCOUNTER SYMPTOMS
DEPRESSION: 0
VOMITING: 0
WHEEZING: 0
CONFUSION: 0
NUMBNESS: 0
DIARRHEA: 0
WEAKNESS: 0
FEVER: 0
ABDOMINAL PAIN: 0
LOSS OF SENSATION IN FEET: 0
BLOOD IN STOOL: 0
UNEXPECTED WEIGHT CHANGE: 0
DYSURIA: 0
COUGH: 0
DIZZINESS: 0
OCCASIONAL FEELINGS OF UNSTEADINESS: 0
DIFFICULTY URINATING: 0
SHORTNESS OF BREATH: 0
NAUSEA: 0
CHILLS: 0
LIGHT-HEADEDNESS: 0
TROUBLE SWALLOWING: 0

## 2024-02-19 NOTE — PROGRESS NOTES
"Subjective   Patient ID: Phuc Cheek is a 73 y.o. male who presents for Follow-up (Pt presents with spouse in F/U, rx's needed, would like more than 1 refill, .BL).  HPI    Generally feeling well.    Checks BP at home, typically 120-130/75-80.    Review of Systems   Constitutional:  Negative for chills, fever and unexpected weight change.   HENT:  Negative for ear pain and trouble swallowing.    Respiratory:  Negative for cough, shortness of breath and wheezing.    Cardiovascular:  Negative for chest pain.   Gastrointestinal:  Negative for abdominal pain, blood in stool, diarrhea, nausea and vomiting.   Genitourinary:  Negative for difficulty urinating and dysuria.   Skin:  Negative for rash.   Neurological:  Negative for dizziness, syncope, weakness, light-headedness and numbness.   Psychiatric/Behavioral:  Negative for behavioral problems and confusion.          Objective   /70   Pulse 62   Ht 1.778 m (5' 10\")   Wt 113 kg (250 lb 2 oz)   SpO2 95%   BMI 35.89 kg/m²     Physical Exam  Vitals and nursing note reviewed.   Constitutional:       General: He is not in acute distress.     Appearance: Normal appearance.   HENT:      Head: Normocephalic and atraumatic.   Eyes:      General: No scleral icterus.     Extraocular Movements: Extraocular movements intact.      Conjunctiva/sclera: Conjunctivae normal.   Pulmonary:      Effort: Pulmonary effort is normal. No respiratory distress.   Skin:     General: Skin is warm and dry.      Coloration: Skin is not jaundiced.   Neurological:      Mental Status: He is alert and oriented to person, place, and time. Mental status is at baseline.   Psychiatric:         Behavior: Behavior normal.         Assessment/Plan   Problem List Items Addressed This Visit       HTN (hypertension) - Primary     Well controlled.          Relevant Medications    potassium chloride CR (Klor-Con) 10 mEq ER tablet    furosemide (Lasix) 20 mg tablet    pravastatin (Pravachol) 10 mg " [id number]: Study Visit Note   Subject name: Caitlyn Cardenas     Visit: Week 4, injection 2    Did the study visit occur within the appropriate window allowed by the protocol? yes    Since the last study visit, She has been doing well. She reports no aGVHD symptoms. Reports that she is following steroid taper. She stated that she has only been taking one ruxolitinib (5mg) pill BID, rather than 10mg BID. After reviewing labs and response with PI, PI has decided to keep patient on 5mg BID dose. Will continue to monitor labs.    I have personally interviewed Caitlyn Cardenas and reviewed her medical record for adverse events and concomitant medications and these have been recorded on the corresponding logs in Caitlyn Cardenas's research file.     Special considerations for today's visit were reviewed with staff administering the study intervention including: ok to receive subcutaneous pregnyl    Caitlyn Cardenas was given the opportunity to ask any trial related questions.  Please see provider progress note for physical exam and other clinical information. Labs were reviewed - any significant lab values were addressed and reviewed.    Jaylene Miramontes RN     tablet    Other Relevant Orders    Basic Metabolic Panel    Hyperlipidemia    Relevant Orders    Comprehensive Metabolic Panel    Creatine Kinase    Lipid Panel     Other Visit Diagnoses       CVD (cardiovascular disease)        Relevant Medications    potassium chloride CR (Klor-Con) 10 mEq ER tablet    furosemide (Lasix) 20 mg tablet    pravastatin (Pravachol) 10 mg tablet

## 2024-02-19 NOTE — PATIENT INSTRUCTIONS
Please return for a(n) blood pressure, medication, Wellness visit, follow-up appointment in August 2024, after tests to review results and options, earlier if any question or concern.    Avoid taking Biotin for a week prior to any blood tests, as it can interfere with certain results. Fasting for labs means 12 hours, nothing to eat or drink, except water and medications, unless directed otherwise.    For assistance with scheduling referrals or consultations, please call [phone]. For laboratory, radiology, and other tests, please call [phone] ([phone] for pediatrics). Please review prescription inserts and published information for possible adverse effects of all medications. Return after testing or consultation to review results and recommendations, if symptoms persist, change, worsen, or return, or if you have any question or concern. If you do not get results within 7-10 days, or you have any question or concern, please send a message, call the office ([phone]), or return to the office for a follow-up appointment. For non-emergencies, you may call the office. For emergencies, call 9-1-1 or go to the nearest Emergency Department. Please schedule additional appointment(s) to address concern(s) not addressed today.    In general, results are not released or discussed over the telephone, but at an appointment or via  Connectivity. Results of tests done through [facility] are released via  Connectivity (see below).  [email or web address]   Connectivity support line: [phone]

## 2024-03-04 ENCOUNTER — OFFICE VISIT (OUTPATIENT)
Dept: PRIMARY CARE | Facility: CLINIC | Age: 74
End: 2024-03-04
Payer: MEDICARE

## 2024-03-04 VITALS
BODY MASS INDEX: 36.01 KG/M2 | WEIGHT: 251 LBS | OXYGEN SATURATION: 93 % | HEART RATE: 58 BPM | DIASTOLIC BLOOD PRESSURE: 68 MMHG | SYSTOLIC BLOOD PRESSURE: 109 MMHG

## 2024-03-04 DIAGNOSIS — M54.2 CERVICALGIA: Primary | ICD-10-CM

## 2024-03-04 PROCEDURE — 1036F TOBACCO NON-USER: CPT | Performed by: FAMILY MEDICINE

## 2024-03-04 PROCEDURE — 1159F MED LIST DOCD IN RCRD: CPT | Performed by: FAMILY MEDICINE

## 2024-03-04 PROCEDURE — 1125F AMNT PAIN NOTED PAIN PRSNT: CPT | Performed by: FAMILY MEDICINE

## 2024-03-04 PROCEDURE — 99214 OFFICE O/P EST MOD 30 MIN: CPT | Performed by: FAMILY MEDICINE

## 2024-03-04 PROCEDURE — 3078F DIAST BP <80 MM HG: CPT | Performed by: FAMILY MEDICINE

## 2024-03-04 PROCEDURE — 3074F SYST BP LT 130 MM HG: CPT | Performed by: FAMILY MEDICINE

## 2024-03-04 RX ORDER — DIAZEPAM 2 MG/1
2-4 TABLET ORAL ONCE
Qty: 2 TABLET | Refills: 0 | Status: SHIPPED | OUTPATIENT
Start: 2024-03-04 | End: 2024-03-04

## 2024-03-04 RX ORDER — PREDNISONE 10 MG/1
TABLET ORAL
Qty: 20 TABLET | Refills: 0 | Status: SHIPPED | OUTPATIENT
Start: 2024-03-04 | End: 2024-03-17

## 2024-03-04 ASSESSMENT — ENCOUNTER SYMPTOMS
NAUSEA: 0
NECK PAIN: 1
WEAKNESS: 0
CHILLS: 0
TROUBLE SWALLOWING: 0
FEVER: 0
CONFUSION: 0
ABDOMINAL PAIN: 0
UNEXPECTED WEIGHT CHANGE: 0
SHORTNESS OF BREATH: 0
WHEEZING: 0
DIARRHEA: 0
DIFFICULTY URINATING: 0
NUMBNESS: 0
VOMITING: 0
DYSURIA: 0
BLOOD IN STOOL: 0
DIZZINESS: 0
LIGHT-HEADEDNESS: 0
COUGH: 0

## 2024-03-04 NOTE — PROGRESS NOTES
Subjective   Patient ID: Phuc Cheek is a 73 y.o. male who presents for Neck Pain (Pt presents with spouse c/o severe neck pain has appt with ortho March 21, [facility], +10 requesting scan.BL).    HPI     c/o severe neck pain. Started a week ago Wednesday. Wearing a soft cervical collar, which helps. Tried rigid cervical collar, which provided a lot of relief. Constant pain 8/10 with 10+/10 occasional jabbing pain left neck just lateral to the spine, radiates down the neck into the trapezius. Denies radiation to the scalp, head, ear.    Facial symptoms have resolved. PT helped with symptoms radiating into the left arm.    Using warm compresses, seems to help the most when it's on. TENS, cold compresses didn't seem to help.    Review of Systems   Constitutional:  Negative for chills, fever and unexpected weight change.   HENT:  Negative for ear pain and trouble swallowing.    Respiratory:  Negative for cough, shortness of breath and wheezing.    Cardiovascular:  Negative for chest pain.   Gastrointestinal:  Negative for abdominal pain, blood in stool, diarrhea, nausea and vomiting.   Genitourinary:  Negative for difficulty urinating and dysuria.   Musculoskeletal:  Positive for neck pain.   Skin:  Negative for rash.   Neurological:  Negative for dizziness, syncope, weakness, light-headedness and numbness.   Psychiatric/Behavioral:  Negative for behavioral problems and confusion.        Objective   /68   Pulse 58   Wt 114 kg (251 lb)   SpO2 93%   BMI 36.01 kg/m²     Physical Exam  Vitals and nursing note reviewed.   Constitutional:       General: He is in acute distress (mild due to pain).      Appearance: Normal appearance.   HENT:      Head: Normocephalic and atraumatic.   Eyes:      General: No scleral icterus.     Extraocular Movements: Extraocular movements intact.      Conjunctiva/sclera: Conjunctivae normal.   Pulmonary:      Effort: Pulmonary effort is normal. No respiratory distress.    Musculoskeletal:         General: Tenderness (C3-5 midline and left cervical) present.   Skin:     General: Skin is warm and dry.      Coloration: Skin is not jaundiced.   Neurological:      Mental Status: He is alert and oriented to person, place, and time. Mental status is at baseline.      Motor: No weakness.   Psychiatric:         Behavior: Behavior normal.         Assessment/Plan   Problem List Items Addressed This Visit             ICD-10-CM    Cervicalgia - Primary M54.2     Prednisone taper, check MRI, keep appointment with ortho on 3-. ER if any change/worsening.         Relevant Medications    diazePAM (Valium) 2 mg tablet    predniSONE (Deltasone) 10 mg tablet    Other Relevant Orders    MR cervical spine wo IV contrast

## 2024-03-04 NOTE — PATIENT INSTRUCTIONS
Don't take Valium/diazepam when taking Oxycodone/Percocet. Don't drive after taking Valium.    Please return or seek medical attention if symptoms persist, change, worsen, or return. For emergencies, call 9-1-1 or go to the nearest Emergency Room.    Avoid taking Biotin for a week prior to any blood tests, as it can interfere with certain results. Fasting for labs means 12 hours, nothing to eat or drink, except water and medications, unless directed otherwise.    For assistance with scheduling referrals or consultations, please call [phone]. For laboratory, radiology, and other tests, please call [phone] ([phone] for pediatrics). Please review prescription inserts and published information for possible adverse effects of all medications. Return after testing or consultation to review results and recommendations, if symptoms persist, change, worsen, or return, or if you have any question or concern. If you do not get results within 7-10 days, or you have any question or concern, please send a message, call the office ([phone]), or return to the office for a follow-up appointment. For non-emergencies, you may call the office. For emergencies, call 9-1-1 or go to the nearest Emergency Department. Please schedule additional appointment(s) to address concern(s) not addressed today.    In general, results are not released or discussed over the telephone, but at an appointment or via  KLab. Results of tests done through [facility] are released via  KLab (see below).  [email or web address]   KLab support line: [phone]

## 2024-03-08 ENCOUNTER — HOSPITAL ENCOUNTER (OUTPATIENT)
Dept: RADIOLOGY | Facility: HOSPITAL | Age: 74
Discharge: HOME | End: 2024-03-08
Payer: MEDICARE

## 2024-03-08 DIAGNOSIS — M54.2 CERVICALGIA: ICD-10-CM

## 2024-03-08 PROCEDURE — 72141 MRI NECK SPINE W/O DYE: CPT | Performed by: STUDENT IN AN ORGANIZED HEALTH CARE EDUCATION/TRAINING PROGRAM

## 2024-03-08 PROCEDURE — 72141 MRI NECK SPINE W/O DYE: CPT

## 2024-04-01 ENCOUNTER — OFFICE VISIT (OUTPATIENT)
Dept: PAIN MEDICINE | Facility: CLINIC | Age: 74
End: 2024-04-01
Payer: MEDICARE

## 2024-04-01 VITALS — RESPIRATION RATE: 20 BRPM | DIASTOLIC BLOOD PRESSURE: 82 MMHG | SYSTOLIC BLOOD PRESSURE: 148 MMHG | HEART RATE: 68 BPM

## 2024-04-01 DIAGNOSIS — M96.1 CERVICAL POST-LAMINECTOMY SYNDROME: ICD-10-CM

## 2024-04-01 PROCEDURE — 1159F MED LIST DOCD IN RCRD: CPT | Performed by: ANESTHESIOLOGY

## 2024-04-01 PROCEDURE — 99213 OFFICE O/P EST LOW 20 MIN: CPT | Performed by: ANESTHESIOLOGY

## 2024-04-01 PROCEDURE — 3079F DIAST BP 80-89 MM HG: CPT | Performed by: ANESTHESIOLOGY

## 2024-04-01 PROCEDURE — 3077F SYST BP >= 140 MM HG: CPT | Performed by: ANESTHESIOLOGY

## 2024-04-01 PROCEDURE — 1125F AMNT PAIN NOTED PAIN PRSNT: CPT | Performed by: ANESTHESIOLOGY

## 2024-04-01 RX ORDER — OXYCODONE AND ACETAMINOPHEN 10; 325 MG/1; MG/1
1 TABLET ORAL 3 TIMES DAILY PRN
Qty: 84 TABLET | Refills: 0 | Status: SHIPPED | OUTPATIENT
Start: 2024-04-05

## 2024-04-01 RX ORDER — OXYCODONE AND ACETAMINOPHEN 10; 325 MG/1; MG/1
1 TABLET ORAL 3 TIMES DAILY PRN
Qty: 84 TABLET | Refills: 0 | Status: SHIPPED | OUTPATIENT
Start: 2024-05-03

## 2024-04-01 RX ORDER — OXYCODONE AND ACETAMINOPHEN 10; 325 MG/1; MG/1
1 TABLET ORAL EVERY 6 HOURS PRN
Qty: 84 TABLET | Refills: 0 | Status: SHIPPED | OUTPATIENT
Start: 2024-05-31 | End: 2024-06-28

## 2024-04-01 RX ORDER — ROPINIROLE 0.25 MG/1
0.25 TABLET, FILM COATED ORAL NIGHTLY
Qty: 30 TABLET | Refills: 11 | Status: SHIPPED | OUTPATIENT
Start: 2024-04-01 | End: 2025-04-01

## 2024-04-01 RX ORDER — METHOCARBAMOL 750 MG/1
750 TABLET, FILM COATED ORAL 3 TIMES DAILY PRN
Qty: 90 TABLET | Refills: 2 | Status: SHIPPED | OUTPATIENT
Start: 2024-04-01

## 2024-04-01 ASSESSMENT — ENCOUNTER SYMPTOMS
LOSS OF SENSATION IN FEET: 0
DEPRESSION: 0
OCCASIONAL FEELINGS OF UNSTEADINESS: 0

## 2024-04-01 ASSESSMENT — PAIN DESCRIPTION - DESCRIPTORS: DESCRIPTORS: ACHING

## 2024-04-01 ASSESSMENT — PAIN - FUNCTIONAL ASSESSMENT
PAIN_FUNCTIONAL_ASSESSMENT: 0-10
PAIN_FUNCTIONAL_ASSESSMENT: 0-10

## 2024-04-01 ASSESSMENT — PAIN SCALES - GENERAL: PAINLEVEL_OUTOF10: 4

## 2024-04-01 NOTE — PROGRESS NOTES
History Of Present Illness  Phuc Cheek is a 73 y.o. male presenting with  neck pain.  Patient sustained a fall in January 2024 that exacerbated his neck pain right greater than left.  Patient was referred for physical therapy for which she reported good relief of his neck pain until recently when he started noticing tingling and numbness on the left side of his face.  No history of any numbness or weakness in the upper extremity no history of any amaurosis fugax or blurred vision.  No history of any slurred speech or imbalance.  Current pain medications include oxycodone 10 mg 3 times daily and methocarbamol 750 mg once at bedtime.  Patient reports 80% relief with the current regimen.  Patient was scheduled for a left C3, C4 . C5 cervical medial branch injection however patient declined and is having the procedure done at [facility] .  Past medical history significant for cervical spine fusion .     Past Medical History  He has a past medical history of Acute bronchitis (02/16/2024), Arthritis (Unknown), Basal cell carcinoma of skin, unspecified, Cellulitis of lower extremity (02/16/2024), CHF (congestive heart failure) (CMS/[facility]) (2014), Disease due to severe acute respiratory syndrome coronavirus 2 (SARS-CoV-2) (04/11/2023), Diverticulosis of intestine, part unspecified, without perforation or abscess without bleeding, Eczema (04/11/2023), Heart disease (1999), Hematuria (02/16/2024), HLD (hyperlipidemia) (04/11/2023), Hypertension (1999), Myocardial infarction (CMS/[facility]) (11/5/2014), Noninfective gastroenteritis and colitis, unspecified, Other abnormal glucose, Personal history of (healed) traumatic fracture, Personal history of other diseases of the circulatory system, Personal history of other diseases of the digestive system, Personal history of other diseases of the digestive system, Personal history of other diseases of the digestive system, Personal history of other diseases of the  musculoskeletal system and connective tissue, Personal history of other diseases of the nervous system and sense organs, Personal history of other diseases of the respiratory system, Personal history of other medical treatment, Personal history of other specified conditions, Personal history of urinary calculi, Restless legs syndrome, Right lower quadrant abdominal pain (02/24/2023), Scalp hematoma (02/16/2024), Sore throat (04/11/2023), Thrush, oral (04/11/2023), Umbilical hernia without obstruction or gangrene, Unspecified atherosclerosis, and Varicella (unknown).    Surgical History  He has a past surgical history that includes Tonsillectomy (07/22/2016); Cervical fusion (07/22/2016); Hand tendon surgery (07/22/2016); Knee surgery (07/22/2016); Other surgical history (07/22/2016); Coronary artery bypass graft (07/22/2016); Other surgical history (10/08/2021); Other surgical history (02/17/2017); Esophagogastroduodenoscopy (02/17/2017); Splenectomy, total (02/17/2017); Colonoscopy (02/17/2017); Other surgical history (02/17/2017); Other surgical history (02/17/2017); Other surgical history (02/17/2017); Hemorrhoid surgery (02/17/2017); Appendectomy (02/17/2017); Other surgical history (02/17/2017); Other surgical history (02/17/2017); Other surgical history (02/17/2017); Hernia repair (02/17/2017); MR angio head wo IV contrast (05/28/2016); MR angio neck wo IV contrast (05/28/2016); MR angio head wo IV contrast (05/03/2018); MR angio neck wo IV contrast (05/03/2018); MR angio head wo IV contrast (05/03/2018); Coronary stent placement (1999,2000,2014); Joint replacement (4/29/2014 &3/14/2022 knees); Sinus surgery (10/29/2014); Spine surgery (1993 & 2018 neck fusion); and Cardiac catheterization (1999,2000).     Social History  He reports that he has never smoked. He has never used smokeless tobacco. He reports current alcohol use of about 1.0 - 2.0 standard drink of alcohol per week. He reports that he does not use  drugs.    Family History  Family History   Problem Relation Name Age of Onset    Cancer Mother MOM         breast    Heart disease Mother MOM     Breast cancer Mother MOM     Arthritis Mother MOM     Hyperlipidemia Mother MOM     Hypertension Mother MOM     Heart attack Mother MOM     Arthritis Father DAD     Other (bladder cancer) Father DAD     Diabetes Father DAD     Heart disease Father DAD     Other (cabg) Father DAD     Hypertension Father DAD     Heart attack Father DAD     Skin cancer Father DAD     Diabetes Sister Sister     Arthritis Sister Sister     Goiter Sister Sister     Hyperlipidemia Sister Sister     Heart disease Sister Sister only     Accidental death Brother Brother     Arthritis Brother Brother     Hyperlipidemia Brother Brother     Heart disease Brother Brother only         Allergies  Adhesive tape-silicones, Bee venom protein (honey bee), Niacin, Simvastatin, and Tetracyclines    Review of systems:   13-point review of systems done and negative except as noted in HPI      Physical Exam   Pertinent findings: Limitation of motion with flexion extension and lateral rotation.  Tenderness on palpation C3-C4 cervical facet bilaterally.  Cranial nerve examination 1 through 12 was negative  Vital signs: Reviewed  Constitutional: No acute distress, well appearing and well nourished. Patient appears stated age.   Eyes: Conjunctiva non-icteric and eye lids are without obvious rash or drooping. Pupils are symmetric.   Ears, Nose, Mouth, and Throat: External ears and nose appear to be without deformity or rash. No lesions or masses noted. Hearing is grossly intact.   Neck:. No JVD noted, tracheal position is midline.   Head and Face: Examination of the head and face revealed no abnormalities.   Respiratory: No gasping or shortness of breath noted, no use of accessory muscles noted.   Cardiovascular: Examination for edema is normal.   GI: Abdomen nontender to palpation.   Skin: No rashes or open  lesions/ulcers identified on skin.   Musk: Digits/nails show no clubbing or cyanosis. No asymmetry or masses noted of the musculature. Examination of the muscles/joints/bones show normal range of motion. Gait is grossly [].  Able to walk on toes and heels.   Neurologic: Cranial nerves II-XII intact, motor strength 5/5 muscle strength of the lower extremities bilaterally and equal. 5/5 muscle strength of the upper extremities bilaterally and equal.   Reflexes: normal.   Sensation: Normal to touch and pinprick in upper extremities bilaterally  Provocative tests:       Last Recorded Vitals  /82   Pulse 68   Resp 20     Relevant Results            Last OARRS Review: No data recorded    I have personally reviewed the OARRS report for Phuc Cheek I have considered the risks of abuse, dependence, addiction and diversion  Overdose risk score is 120     Assessment/Plan   Diagnoses and all orders for this visit:  Cervical post-laminectomy syndrome  -     methocarbamol (Robaxin) 750 mg tablet; Take 1 tablet (750 mg) by mouth 3 times a day as needed for muscle spasms.  -     oxyCODONE-acetaminophen (Percocet)  mg tablet; Take 1 tablet by mouth 3 times a day as needed for severe pain (7 - 10). Do not start before April 5, 2024.  -     oxyCODONE-acetaminophen (Percocet)  mg tablet; Take 1 tablet by mouth 3 times a day as needed for severe pain (7 - 10). Do not start before May 3, 2024.  -     oxyCODONE-acetaminophen (Percocet)  mg tablet; Take 1 tablet by mouth every 6 hours if needed for severe pain (7 - 10) for up to 28 days. Do not start before May 31, 2024.  -     rOPINIRole (Requip) 0.25 mg tablet; Take 1 tablet (0.25 mg) by mouth once daily at bedtime.      I discussed with the patient the importance of following up with the his primary care physician or cardiologist regarding the frequent falls that he had suggestive of syncope.  I also recommended to follow-up with us after the injection  for his neck for consideration of radiofrequency ablation or stimulation    Plan  Renew current pain medications       Osvaldo Kuhn MD

## 2024-04-29 DIAGNOSIS — I25.10 CVD (CARDIOVASCULAR DISEASE): ICD-10-CM

## 2024-04-29 DIAGNOSIS — I10 HYPERTENSION, UNSPECIFIED TYPE: ICD-10-CM

## 2024-04-30 DIAGNOSIS — I25.10 CORONARY ARTERY DISEASE INVOLVING NATIVE CORONARY ARTERY OF NATIVE HEART WITHOUT ANGINA PECTORIS: ICD-10-CM

## 2024-04-30 DIAGNOSIS — E78.5 HYPERLIPIDEMIA, UNSPECIFIED HYPERLIPIDEMIA TYPE: ICD-10-CM

## 2024-04-30 DIAGNOSIS — I10 PRIMARY HYPERTENSION: ICD-10-CM

## 2024-05-02 DIAGNOSIS — G25.81 RESTLESS LEGS SYNDROME: ICD-10-CM

## 2024-05-03 RX ORDER — ATENOLOL 25 MG/1
25 TABLET ORAL DAILY
Qty: 90 TABLET | Refills: 0 | Status: SHIPPED | OUTPATIENT
Start: 2024-05-03

## 2024-05-03 RX ORDER — EZETIMIBE 10 MG/1
10 TABLET ORAL DAILY
Qty: 90 TABLET | Refills: 0 | Status: SHIPPED | OUTPATIENT
Start: 2024-05-03

## 2024-05-03 RX ORDER — ROPINIROLE 0.5 MG/1
0.5 TABLET, FILM COATED ORAL NIGHTLY
Qty: 30 TABLET | Refills: 11 | Status: SHIPPED | OUTPATIENT
Start: 2024-05-03 | End: 2025-05-03

## 2024-05-03 RX ORDER — CLOPIDOGREL BISULFATE 75 MG/1
75 TABLET ORAL DAILY
Qty: 90 TABLET | Refills: 0 | Status: SHIPPED | OUTPATIENT
Start: 2024-05-03

## 2024-05-03 RX ORDER — PRAVASTATIN SODIUM 10 MG/1
TABLET ORAL
Qty: 39 TABLET | Refills: 0 | Status: SHIPPED | OUTPATIENT
Start: 2024-05-03

## 2024-06-26 ENCOUNTER — OFFICE VISIT (OUTPATIENT)
Dept: PAIN MEDICINE | Facility: CLINIC | Age: 74
End: 2024-06-26
Payer: MEDICARE

## 2024-06-26 VITALS
HEART RATE: 59 BPM | RESPIRATION RATE: 20 BRPM | DIASTOLIC BLOOD PRESSURE: 82 MMHG | SYSTOLIC BLOOD PRESSURE: 127 MMHG | OXYGEN SATURATION: 95 %

## 2024-06-26 DIAGNOSIS — M51.16 LUMBAR DISC DISEASE WITH RADICULOPATHY: ICD-10-CM

## 2024-06-26 DIAGNOSIS — M47.816 LUMBAR SPONDYLOSIS: ICD-10-CM

## 2024-06-26 DIAGNOSIS — M96.1 CERVICAL POST-LAMINECTOMY SYNDROME: Primary | ICD-10-CM

## 2024-06-26 DIAGNOSIS — G25.81 RESTLESS LEGS SYNDROME: ICD-10-CM

## 2024-06-26 PROCEDURE — 1125F AMNT PAIN NOTED PAIN PRSNT: CPT | Performed by: NURSE PRACTITIONER

## 2024-06-26 PROCEDURE — 1160F RVW MEDS BY RX/DR IN RCRD: CPT | Performed by: NURSE PRACTITIONER

## 2024-06-26 PROCEDURE — 3074F SYST BP LT 130 MM HG: CPT | Performed by: NURSE PRACTITIONER

## 2024-06-26 PROCEDURE — 3079F DIAST BP 80-89 MM HG: CPT | Performed by: NURSE PRACTITIONER

## 2024-06-26 PROCEDURE — 1159F MED LIST DOCD IN RCRD: CPT | Performed by: NURSE PRACTITIONER

## 2024-06-26 PROCEDURE — 99214 OFFICE O/P EST MOD 30 MIN: CPT | Performed by: NURSE PRACTITIONER

## 2024-06-26 PROCEDURE — 1036F TOBACCO NON-USER: CPT | Performed by: NURSE PRACTITIONER

## 2024-06-26 RX ORDER — METHOCARBAMOL 750 MG/1
750 TABLET, FILM COATED ORAL 3 TIMES DAILY PRN
Qty: 90 TABLET | Refills: 2 | Status: SHIPPED | OUTPATIENT
Start: 2024-06-26 | End: 2024-06-26

## 2024-06-26 RX ORDER — PREDNISONE 20 MG/1
20 TABLET ORAL 2 TIMES DAILY
Qty: 10 TABLET | Refills: 0 | Status: SHIPPED | OUTPATIENT
Start: 2024-06-26 | End: 2024-07-01

## 2024-06-26 RX ORDER — METHOCARBAMOL 750 MG/1
750 TABLET, FILM COATED ORAL 3 TIMES DAILY PRN
Qty: 90 TABLET | Refills: 2 | Status: SHIPPED | OUTPATIENT
Start: 2024-06-26

## 2024-06-26 RX ORDER — OXYCODONE AND ACETAMINOPHEN 10; 325 MG/1; MG/1
1 TABLET ORAL 3 TIMES DAILY PRN
Qty: 84 TABLET | Refills: 0 | Status: CANCELLED | OUTPATIENT
Start: 2024-07-26

## 2024-06-26 RX ORDER — OXYCODONE AND ACETAMINOPHEN 10; 325 MG/1; MG/1
1 TABLET ORAL 3 TIMES DAILY PRN
Qty: 84 TABLET | Refills: 0 | Status: CANCELLED | OUTPATIENT
Start: 2024-08-23 | End: 2024-09-20

## 2024-06-26 RX ORDER — ROPINIROLE 1 MG/1
1 TABLET, FILM COATED ORAL 3 TIMES DAILY
Qty: 90 TABLET | Refills: 2 | Status: SHIPPED | OUTPATIENT
Start: 2024-06-26 | End: 2024-09-24

## 2024-06-26 RX ORDER — OXYCODONE AND ACETAMINOPHEN 10; 325 MG/1; MG/1
1 TABLET ORAL 3 TIMES DAILY PRN
Qty: 84 TABLET | Refills: 0 | Status: CANCELLED | OUTPATIENT
Start: 2024-06-28

## 2024-06-26 ASSESSMENT — ENCOUNTER SYMPTOMS
TREMORS: 0
HEADACHES: 0
EYES NEGATIVE: 1
BACK PAIN: 1
GASTROINTESTINAL NEGATIVE: 1
WEAKNESS: 0
RESPIRATORY NEGATIVE: 1
ALLERGIC/IMMUNOLOGIC NEGATIVE: 1
CARDIOVASCULAR NEGATIVE: 1
ENDOCRINE NEGATIVE: 1
SEIZURES: 0
JOINT SWELLING: 0
MYALGIAS: 1
SPEECH DIFFICULTY: 0
NUMBNESS: 0
LIGHT-HEADEDNESS: 0
CONSTITUTIONAL NEGATIVE: 1
NECK PAIN: 1
FACIAL ASYMMETRY: 0
NECK STIFFNESS: 0
ARTHRALGIAS: 1
HEMATOLOGIC/LYMPHATIC NEGATIVE: 1
PSYCHIATRIC NEGATIVE: 1
DIZZINESS: 0

## 2024-06-26 ASSESSMENT — PAIN SCALES - GENERAL: PAINLEVEL_OUTOF10: 4

## 2024-06-26 ASSESSMENT — PAIN DESCRIPTION - DESCRIPTORS: DESCRIPTORS: THROBBING;SHOOTING

## 2024-06-26 ASSESSMENT — PAIN - FUNCTIONAL ASSESSMENT: PAIN_FUNCTIONAL_ASSESSMENT: 0-10

## 2024-06-26 NOTE — PROGRESS NOTES
Subjective   Patient ID: Phuc Cheek is a 73 y.o. male who presents for Neck Pain and Back Pain.  HPI       Phuc follows up for interval reevaluation of his chronic pain of cervicalgia from cervical postlaminectomy syndrome, low back pain from lumbar spondylosis, lumbar degenerative disc with radiculitis and left knee pain from arthritis.    Over the last 2 weeks is with a flare of posterior cervical pain that radiates to the right posterior shoulder and arm along with numbness, tingling and weakness.  Pain can be as high as a 5-6 out of a 10.  Added prednisone 20 mg to take twice a day for 5 days of this flare pain.      Reports that he took his morphine sulfate immediate release 15 mg to help manage pain.  He was taken off this medication December 2022 by Dr. Enriquez for hyperalgesia symptoms.  Advised that he should not take this medication since it was discontinued and Percocet as these are both too short acting medications.  He must choose between 1 or the other.      Continues with the Percocet as prescribed 10 mg 3 times daily.  Denies negative side effects from this medication.  Average pain score is 4 out of 10 with this medication.    Toxicology today.  Toxicology  inconsistent January 9, 2024 with positive for morphine and Percocet.    If toxicology comes back positive for morphine will retest.  And he must bring remaining morphine sulfate medication for chemical disintegration.  Will hold off sending any Percocet until test is negative for morphine sulfate.     Continues with methocarbamol 750 mg 3 times daily as needed this does help reduce muscle spasm and pain up to 90%.  Average pain score with the medication is 4-10.    Requip for restless legs initiated 0.25 mg and increase to 0.5 mg has not helped to reduce pain or improve function for restless legs.  Increased Requip to 1 mg every evening.    For continuity:   Given the patient's report of reduced pain and improved functional ability without  adverse effects, it is reasonable to treat with narcotic medications. The terms of the opioid agreement as well as the potential risks and adverse effects of the patient's medication regimen were discussed in detail. This includes if applicable due to dosage of medication permission to discuss and coordinate care with other treatment providers relevant to the patients condition. The patient verbalized understanding.     Risks and side effects of chronic opioid therapy including but not limited to tolerance, dependence, constipation, hyperalgesia, cognitive side effects, addiction and possible death due to overuse and or misuse were discussed. I also discussed that such medications when co-administered with other sedative agents including but not limited to alcohol, benzodiazepines, sedative hypnotics and illegal drugs could pose life threatening consequences including death. I also explained the impact that the administration of such medication has on a patient with obstructive sleep apnea and continued recommendations for use of apnea devices if ordered are prescribed by other physicians. In order to effectively and safely treat the pain, I also emphasized the importance of compliance with the treatment plan, as well as compliance with the terms of the opioid agreement, which was reviewed in detail. I explained the importance of being responsible with the medications and to take these only as prescribed, never in excess and never for reasons other than pain reduction. The patient was counseled on keeping the medications safe and locked away from children and other adults as well as disposal methods and options. The patient understood the risks and instructions.     I also discussed with the patient in detail that based on the clinical response to the opioid medications and improvements of activities of daily living, sleep, and work performance in light of compliance with the treatment plan we can continue this form  of therapy for the above chronic pain. The goal and rationale used for current treatment with chronic opioid medication is to control the pain and alleviate disability induced by the chronic pain condition noted above after failures of other non-opioid and nonpharmacological modalities to treat the chronic pain and the symptoms associated with have failed. The patient understood the goals in terms of the above treatment plan and had no further questions prior to leaving the office today.     Of note, the above-mentioned diagnoses/conditions and expected fluctuating nature of pain, and pain characteristic changes may lead to prolonged functional impairment requiring frequent and multiple reassessments with continued high level medical decision making. As noted, medication and medication management may require opiate therapy in excess of a routine less than 30 day medication requirement. The patient may require daily opiate therapy necessitating month-long prescription medication as noted above in order to perform activities of daily living and achieve acceptable quality of life with respect to their chronic pain condition for the foreseeable future. We monitor our patient's carefully through drug monitoring, medication counts, urine drug testing specific to their medication as well as a myriad of other substances and with frequent follow-ups with interval reassement of the chronic pain condition, its pathophysiology and prognosis.     The level of clinical decision making at this office visit is high due to high risks and complications including mortality and morbidity related to acute and chronic pain with respects to life, bodily function, and treatment. Risks and clinical decisions with respect to under treatment, failure to maintain adequate treatment, and/or overtreatment complications and outcomes were discussed with the patient with respect to their chronic pain conditions, interventional therapies, as well as  the use of various medications including possible controlled/dangerous medications. The amount and complexity of reviewed data at this in subsequent office visits is high given patient's fluctuating clinical presentation, laboratory and radiographic reports, prescription monitoring program data, and medication history as well as other relevant data as noted above. Pertinent negatives and positives data was used in consideration for the above-mentioned high complexity.      Given the patient's total MED, general use of daily opiates, or other coadministered medications in various classes the patient was offered a prescription for Narcan. I instructed the patient that it is important that patient fill this medication in order to demonstrate understanding of the gravity of possible side effects including respiratory depression and risk of overdose of this opiate load or medication combination. As such patient will be required to bring Narcan prescription to follow-up appointments as part of compliance with continued opiate care.     With respect to opiate induced constipation I discussed multiple ways to combat this problem including staying hydrated and taking over-the-counter medications such as Dulcolax, Miralax and Senna. If these treatments are not effective we could consider such medications as Amitiza, Linzess and Movantik.     Disclaimer: This note was transcribed using an audio transcription device. As such, minor errors may be present with regard to spelling, punctuation, and inadvertent word insertion. Please disregard such errors.    Narrative & Impression   Interpreted By:  Sandoval Alejandro,   STUDY:  MR CERVICAL SPINE WO IV CONTRAST;  3/8/2024 4:24 pm      INDICATION:  Signs/Symptoms:cervical pain.      COMPARISON:  MRI of the cervical spine dated 09/14/2022; CT of the cervical spine  dated 09/29/2023;      ACCESSION NUMBER(S):  [id number]      ORDERING CLINICIAN:  GERONIMO TAVAREZ       TECHNIQUE:  Sagittal T1, T2, STIR, axial T1 and axial T2 weighted images were  acquired through the cervical spine.      FINDINGS:  Postsurgical changes of in osseous interbody fusion extending from C5  through C7 with intervertebral disc spacer at C4-C5 are again  demonstrated, similar in appearance to prior MRI in September of 2022.      Cervical vertebral alignment is maintained, without evidence of  significant spondylolisthesis.      Cervical vertebral body heights are preserved without evidence of  compression fractures. No new STIR hyperintense edema is present in  the cervical vertebral marrow.      Mild STIR hyperintense edema is present in the pedicles of C7 on the  left, likely due to underlying degenerate facet osteoarthropathy.  Facet joints themselves are preserved without evidence of subluxation  or perching.      Craniocervical junction is intact with degenerative changes at the  atlantoaxial articulation extending into the anterior epidural space  at the level of C1 and somewhat efface in the adjacent subarachnoid  space.      Cervical spinal cord does not demonstrate any intramedullary cord  signal changes.      C2-C3: No significant spinal canal stenosis is evident. Mild  left-sided neural foraminal narrowing is present due to uncovertebral  and facet joint hypertrophy, similar in appearance to prior exam.      C3-C4: Combination of mild circumferential disc bulging and  ligamentum flavum thickening efface the subarachnoid space, without  significant spinal canal narrowing. There is moderate right-sided and  mild-to-moderate left-sided neural foraminal stenosis due to  uncovertebral and facet joint hypertrophy, similar in appearance to  prior exam.      C4-C5: Posterior osteophytic spurring slightly deforms the anterior  subarachnoid space without spinal canal narrowing. Mild bilateral  neural foraminal stenosis is present due to uncovertebral and facet  joint hypertrophy, similar in appearance  to prior exam.      C5-C6: No significant posterior disc contour abnormality is present.  No significant neural foraminal stenosis is evident.      C6-C7: No significant posterior disc contour abnormality or spinal  canal stenosis is present. Mild bilateral neural foraminal narrowing  is noted due to uncovertebral and facet joint hypertrophy.      C7-T1: There is some effacement of the anterior subarachnoid space  due to spondylolisthesis and dorsal subarachnoid space due to  ligamentum flavum thickening with mild underlying spinal canal  narrowing, similar in appearance to prior examination. Mild bilateral  neural foraminal stenosis is present due to uncovertebral and facet  joint hypertrophy, similar to prior study.      Prevertebral and paraspinal soft tissues do not demonstrate any acute  abnormality.      IMPRESSION:  1.  Multilevel degenerative changes are again demonstrated in the  cervical spine, not significantly progressed since prior exam in  September of 2022, although there is persistent mild spinal canal  narrowing present at the level of C7-T1 due to ligamentum flavum  thickening and disc osteophyte complex. Neural foraminal degenerative  changes are also similar in appearance to prior exam.  2. Postsurgical changes of interbody fusion extending from C5 through  C7 with intervertebral disc spacer at C4-C5 again demonstrated,  similar in appearance to prior MRI in September of 2022.      MACRO:  None      Signed by: Sandoval Alejandro 3/10/2024 4:40 PM  Dictation workstation:   [id number]     Narrative & Impression   MRN: [id number]  Patient Name: HEATHER GUERRERO     STUDY:  MRI T-SPINE WO;  12/19/2022 10:05 am     INDICATION:  mid back pain  M54.6: Back pain, thoracic.     COMPARISON:  11/20/2013.     ACCESSION NUMBER(S):  [id number]     ORDERING CLINICIAN:  GONZALEZ BA     TECHNIQUE:  Sagittal T1, T2, STIR and axial T2 and T1 weighted MR images of the  thoracic spine were obtained.      FINDINGS:  Counting was performed on the  image. Alignment, vertebral body  heights and marrow signal pattern are within normal limits. There  appears to be fusion across the disc spaces at C5-C6 and C6-C7. There  is desiccated disc signal throughout the thoracic spine. Moderate  disc height loss at T3-T4 and mild disc height loss at T4-T5, T5-T6,  T6-T7. The thoracic cord is unremarkable. Incompletely evaluated T2  hyperintense lesion within the right kidney likely represents a cyst.     Mild degenerative changes at C7-T1 through T2-T3 without significant  spinal canal stenosis. Mild neural foraminal narrowing at these  levels.     At T3-T4 there is a right paracentral disc protrusion and mild facet  arthrosis with mild spinal canal stenosis and no significant neural  foraminal stenosis. There is contouring of the thoracic cord without  convincing evidence of abnormal cord signal.     At T4-T5 through T7-T8 there are mild degenerative changes without  significant spinal canal or neural foraminal stenosis.     At T8-T9 there is a small left paracentral disc protrusion and facet  arthrosis without significant spinal canal or right neural foraminal  stenosis. Mild left neural foraminal stenosis. There is contouring of  the ventral thoracic cord without convincing evidence of abnormal  cord signal.     T9-T10 through T12-L1 without significant spinal canal or neural  foraminal stenosis.     IMPRESSION:  Mild degenerative changes throughout the thoracic spine most  pronounced at T3-T4 with mild spinal canal stenosis. There is  contouring of the ventral thoracic cord without convincing evidence  of abnormal cord signal. Degenerative changes of the thoracic spine  are similar to minimally since the prior exam 11/20/2013.     Narrative & Impression   MRN: [id number]  Patient Name: HEATHER GUERRERO     STUDY:  MRI L-SPINE WO;  1/20/2022 12:16 pm     INDICATION:  Low Back Pain  M47.816: Lumbar spondylosis M51.36: Lumbar  disc  narrowing M54.16: Lumbar radiculopathy.     COMPARISON:  August 2000.     ACCESSION NUMBER(S):  [id number]     ORDERING CLINICIAN:  CESAR JOHNSON     TECHNIQUE:  The lumbar spine was studied in the sagital, axial and coronal planes  utiliing T1 and T2 weighted images.     FINDINGS:  The marrow signal and vertebral body height are normal. The conus and  sacrum are normal.  Images at each interspace reveal the following:  L1/L2  There is normal alignment and vertebral body height. The disc space  is normal. There is no evidence of canal or foraminal narrowing.  There is no evidence of bulging or herniated disc.  L2/L3  Mild bulging disc and facet hypertrophy without canal or foraminal  narrowing  L3/L4  Circumferential bulging intervertebral disc. Bilateral facet  hypertrophy. No measurable canal stenosis. Asymmetrical left-sided  foraminal narrowing  L4/L5  Bulging intervertebral disc. Bilateral facet hypertrophy. No  measurable canal stenosis. Moderate/advanced bilateral foraminal  narrowing.  L5/S1  Bilateral facet hypertrophy without canal or foraminal narrowing        IMPRESSION:  * Lumbar spondylosis as described  *No measurable change compared to the previous exam.     The examination was interpreted [facility]       Review of Systems   Constitutional: Negative.    HENT: Negative.     Eyes: Negative.    Respiratory: Negative.     Cardiovascular: Negative.    Gastrointestinal: Negative.    Endocrine: Negative.    Genitourinary: Negative.    Musculoskeletal:  Positive for arthralgias, back pain, gait problem, myalgias and neck pain. Negative for joint swelling and neck stiffness.   Skin: Negative.    Allergic/Immunologic: Negative.    Neurological:  Negative for dizziness, tremors, seizures, syncope, facial asymmetry, speech difficulty, weakness, light-headedness, numbness and headaches.   Hematological: Negative.    Psychiatric/Behavioral: Negative.         Objective   Physical Exam  Vitals and  nursing note reviewed.   Constitutional:       Appearance: Normal appearance.   HENT:      Head: Normocephalic and atraumatic.   Eyes:      Conjunctiva/sclera: Conjunctivae normal.   Pulmonary:      Effort: Pulmonary effort is normal. No respiratory distress.   Musculoskeletal:      Right lower leg: No edema.      Left lower leg: No edema.   Skin:     General: Skin is warm and dry.      Capillary Refill: Capillary refill takes 2 to 3 seconds.   Neurological:      Mental Status: He is alert and oriented to person, place, and time.      Cranial Nerves: No cranial nerve deficit.      Sensory: Sensory deficit present.      Motor: No weakness.      Gait: Gait normal.   Psychiatric:         Behavior: Behavior normal.         Assessment/Plan   Problem List Items Addressed This Visit             ICD-10-CM    Cervical post-laminectomy syndrome - Primary M96.1    Relevant Medications    predniSONE (Deltasone) 20 mg tablet    methocarbamol (Robaxin) 750 mg tablet    Restless legs syndrome G25.81    Relevant Medications    rOPINIRole (Requip) 1 mg tablet        Follow-up 12 weeks if toxicology consistent.    ALMAZ Gilmore-CNP 06/26/24 11:18 AM

## 2024-07-15 ENCOUNTER — TELEPHONE (OUTPATIENT)
Dept: PAIN MEDICINE | Facility: CLINIC | Age: 74
End: 2024-07-15
Payer: MEDICARE

## 2024-07-15 NOTE — TELEPHONE ENCOUNTER
Patient would like result from his labs,. Said okay to leave message if not home    Pt. Aware, will set up an appointment.

## 2024-07-16 ENCOUNTER — CLINICAL SUPPORT (OUTPATIENT)
Dept: PAIN MEDICINE | Facility: CLINIC | Age: 74
End: 2024-07-16
Payer: MEDICARE

## 2024-07-16 DIAGNOSIS — M96.1 CERVICAL POST-LAMINECTOMY SYNDROME: ICD-10-CM

## 2024-07-16 DIAGNOSIS — M47.812 SPONDYLOSIS OF CERVICAL REGION WITHOUT MYELOPATHY OR RADICULOPATHY: ICD-10-CM

## 2024-07-16 RX ORDER — OXYCODONE AND ACETAMINOPHEN 10; 325 MG/1; MG/1
1 TABLET ORAL 3 TIMES DAILY PRN
Qty: 84 TABLET | Refills: 0 | Status: SHIPPED | OUTPATIENT
Start: 2024-07-16

## 2024-07-16 NOTE — PROGRESS NOTES
Per providers request,pt returned remaining MS 15mg tablets from previous RX. See Med verification form.Will notify provider so she can e script Percocet

## 2024-07-30 ASSESSMENT — PROMIS GLOBAL HEALTH SCALE
RATE_SOCIAL_SATISFACTION: VERY GOOD
RATE_QUALITY_OF_LIFE: GOOD
RATE_MENTAL_HEALTH: VERY GOOD
RATE_AVERAGE_FATIGUE: MODERATE
RATE_GENERAL_HEALTH: GOOD
CARRYOUT_PHYSICAL_ACTIVITIES: COMPLETELY
CARRYOUT_SOCIAL_ACTIVITIES: VERY GOOD
RATE_PHYSICAL_HEALTH: FAIR
RATE_AVERAGE_PAIN: 8
EMOTIONAL_PROBLEMS: NEVER

## 2024-08-01 ENCOUNTER — LAB (OUTPATIENT)
Dept: LAB | Facility: LAB | Age: 74
End: 2024-08-01
Payer: MEDICARE

## 2024-08-01 DIAGNOSIS — E78.5 HYPERLIPIDEMIA, UNSPECIFIED HYPERLIPIDEMIA TYPE: ICD-10-CM

## 2024-08-01 LAB
ALBUMIN SERPL BCP-MCNC: 3.9 G/DL (ref 3.4–5)
ALP SERPL-CCNC: 57 U/L (ref 33–136)
ALT SERPL W P-5'-P-CCNC: 12 U/L (ref 10–52)
ANION GAP SERPL CALC-SCNC: 11 MMOL/L (ref 10–20)
AST SERPL W P-5'-P-CCNC: 15 U/L (ref 9–39)
BILIRUB SERPL-MCNC: 0.6 MG/DL (ref 0–1.2)
BUN SERPL-MCNC: 14 MG/DL (ref 6–23)
CALCIUM SERPL-MCNC: 9.2 MG/DL (ref 8.6–10.3)
CHLORIDE SERPL-SCNC: 103 MMOL/L (ref 98–107)
CHOLEST SERPL-MCNC: 151 MG/DL (ref 0–199)
CHOLESTEROL/HDL RATIO: 2.8
CK SERPL-CCNC: 65 U/L (ref 0–325)
CO2 SERPL-SCNC: 27 MMOL/L (ref 21–32)
CREAT SERPL-MCNC: 0.81 MG/DL (ref 0.5–1.3)
EGFRCR SERPLBLD CKD-EPI 2021: >90 ML/MIN/1.73M*2
GLUCOSE SERPL-MCNC: 95 MG/DL (ref 74–99)
HDLC SERPL-MCNC: 53.5 MG/DL
LDLC SERPL CALC-MCNC: 68 MG/DL
NON HDL CHOLESTEROL: 98 MG/DL (ref 0–149)
POTASSIUM SERPL-SCNC: 4.8 MMOL/L (ref 3.5–5.3)
PROT SERPL-MCNC: 6.1 G/DL (ref 6.4–8.2)
SODIUM SERPL-SCNC: 136 MMOL/L (ref 136–145)
TRIGL SERPL-MCNC: 149 MG/DL (ref 0–149)
VLDL: 30 MG/DL (ref 0–40)

## 2024-08-01 PROCEDURE — 80061 LIPID PANEL: CPT

## 2024-08-01 PROCEDURE — 80053 COMPREHEN METABOLIC PANEL: CPT

## 2024-08-01 PROCEDURE — 36415 COLL VENOUS BLD VENIPUNCTURE: CPT

## 2024-08-01 PROCEDURE — 82550 ASSAY OF CK (CPK): CPT
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
## 2024-08-02 RX ORDER — MULTIVIT-MIN/FOLIC/VIT K/LYCOP 400-300MCG
TABLET ORAL
COMMUNITY

## 2024-08-02 RX ORDER — CALCIUM CARBONATE/VITAMIN D3 500-10/5ML
LIQUID (ML) ORAL
COMMUNITY

## 2024-08-05 ENCOUNTER — APPOINTMENT (OUTPATIENT)
Dept: PRIMARY CARE | Facility: CLINIC | Age: 74
End: 2024-08-05
Payer: MEDICARE

## 2024-08-05 VITALS
SYSTOLIC BLOOD PRESSURE: 127 MMHG | TEMPERATURE: 98 F | DIASTOLIC BLOOD PRESSURE: 74 MMHG | BODY MASS INDEX: 35.54 KG/M2 | OXYGEN SATURATION: 94 % | HEART RATE: 58 BPM | WEIGHT: 248.25 LBS | HEIGHT: 70 IN

## 2024-08-05 DIAGNOSIS — M25.552 LEFT HIP PAIN: ICD-10-CM

## 2024-08-05 DIAGNOSIS — Z00.00 MEDICARE ANNUAL WELLNESS VISIT, SUBSEQUENT: Primary | ICD-10-CM

## 2024-08-05 DIAGNOSIS — L60.3 ONYCHOSCHIZIA: ICD-10-CM

## 2024-08-05 PROBLEM — M70.62 GREATER TROCHANTERIC BURSITIS OF LEFT HIP: Status: ACTIVE | Noted: 2024-08-05

## 2024-08-05 PROCEDURE — 3078F DIAST BP <80 MM HG: CPT | Performed by: FAMILY MEDICINE

## 2024-08-05 PROCEDURE — 3074F SYST BP LT 130 MM HG: CPT | Performed by: FAMILY MEDICINE

## 2024-08-05 PROCEDURE — 99214 OFFICE O/P EST MOD 30 MIN: CPT | Performed by: FAMILY MEDICINE

## 2024-08-05 PROCEDURE — 1159F MED LIST DOCD IN RCRD: CPT | Performed by: FAMILY MEDICINE

## 2024-08-05 PROCEDURE — 1036F TOBACCO NON-USER: CPT | Performed by: FAMILY MEDICINE

## 2024-08-05 PROCEDURE — G0439 PPPS, SUBSEQ VISIT: HCPCS | Performed by: FAMILY MEDICINE

## 2024-08-05 PROCEDURE — 1158F ADVNC CARE PLAN TLK DOCD: CPT | Performed by: FAMILY MEDICINE

## 2024-08-05 PROCEDURE — 1123F ACP DISCUSS/DSCN MKR DOCD: CPT | Performed by: FAMILY MEDICINE

## 2024-08-05 PROCEDURE — 1170F FXNL STATUS ASSESSED: CPT | Performed by: FAMILY MEDICINE

## 2024-08-05 RX ORDER — ZINC GLUCONATE 50 MG
50 TABLET ORAL 3 TIMES WEEKLY
COMMUNITY

## 2024-08-05 ASSESSMENT — ENCOUNTER SYMPTOMS
LOSS OF SENSATION IN FEET: 0
ARTHRALGIAS: 1
OCCASIONAL FEELINGS OF UNSTEADINESS: 0
DEPRESSION: 0

## 2024-08-05 ASSESSMENT — ACTIVITIES OF DAILY LIVING (ADL)
DOING_HOUSEWORK: INDEPENDENT
DRESSING: INDEPENDENT
GROCERY_SHOPPING: INDEPENDENT
TAKING_MEDICATION: INDEPENDENT
BATHING: INDEPENDENT
MANAGING_FINANCES: INDEPENDENT

## 2024-08-05 NOTE — PROGRESS NOTES
"Subjective   Reason for Visit: Phuc Cheek is an 74 y.o. male here for Medicare Annual Wellness Visit Subsequent (Pt presents for MCV, 6mo check up, c/o L hip pain x 2 days, requesting xray, , no rx's needed. BL)     Past Medical, Surgical, and Family History reviewed and updated in chart.    Reviewed all medications by prescribing practitioner or clinical pharmacist (such as prescriptions, OTCs, herbal therapies and supplements) and documented in the medical record.    HPI  Historian(s): Self and Wife    Generally feeling well.    c/o left hip pain. Started 2d ago. Rather significant pain. Hurts to get up and walk, or laying on it. Just started abruptly while in bed, noticed first upon rolling over on it. Getting a little better, as far as laying down in bed, but \"stairs are murder.\" The longer he walks, the less it hurts.    Patient Care Team:  Salvador Rachel DO as PCP - General (Family Medicine)  ALMAZ Juares-CNP as PCP - [facility] [place]ributed Provider  Cecilio Osman MD as Referring Physician (Cardiology)  Osvaldo Kuhn MD as Anesthesiologist (Pain Medicine)  Marnie Chavez MD as Referring Physician (Endocrinology)  Ya Maldonado MD as Referring Physician (Internal Medicine)  Vale Johnson (Neurology)  Jayce Valdez DO as Surgeon (Orthopaedic Surgery)  Ruy Steiner MD as Referring Physician (Internal Medicine)     Review of Systems   Musculoskeletal:  Positive for arthralgias.   All other systems reviewed and are negative.      Objective   Vitals:  /74 Comment: pts cuff  Pulse 58   Temp 36.7 °C (98 °F)   Ht 1.778 m (5' 10\")   Wt 113 kg (248 lb 4 oz)   SpO2 94%   BMI 35.62 kg/m²             Physical Exam  Vitals and nursing note reviewed.   Constitutional:       General: He is not in acute distress.     Appearance: Normal appearance. He is well-developed.      Comments: No assistive device presently being used.   HENT:      Head: Normocephalic and atraumatic.      " Nose: Nose normal.   Eyes:      General: No scleral icterus.     Extraocular Movements: Extraocular movements intact.      Conjunctiva/sclera: Conjunctivae normal.   Neck:      Thyroid: No thyromegaly.      Vascular: No carotid bruit or JVD.   Cardiovascular:      Rate and Rhythm: Normal rate and regular rhythm.      Heart sounds: Normal heart sounds.   Pulmonary:      Effort: Pulmonary effort is normal. No respiratory distress.      Breath sounds: Normal breath sounds.   Abdominal:      General: Bowel sounds are normal. There is no distension.      Palpations: Abdomen is soft. There is no mass.      Tenderness: There is no abdominal tenderness. There is no guarding or rebound.   Musculoskeletal:         General: Tenderness (point tenderness left greater trochanter) present.      Cervical back: Normal range of motion. No tenderness.      Right lower leg: No edema.      Left lower leg: No edema.   Skin:     General: Skin is warm and dry.      Coloration: Skin is not jaundiced.      Comments: Very mild occasional onychoschizia    Neurological:      General: No focal deficit present.      Mental Status: He is alert and oriented to person, place, and time. Mental status is at baseline.   Psychiatric:         Mood and Affect: Mood normal.         Behavior: Behavior normal.         Thought Content: Thought content normal.       Assessment & Plan  Medicare annual wellness visit, subsequent  74yM doing fairly well.       Left hip pain  Likely greater trochanter bursitis/tendinitis. Try cold compresses. Follow-up with established ortho Dr. Dodge if persists or worsens.  Orders:  •  XR hip left with pelvis when performed 2 or 3 views; Future    Onychoschizia  Reduce unnecessary hand washing, chemical exposure, trauma.

## 2024-08-05 NOTE — ASSESSMENT & PLAN NOTE
Likely greater trochanter bursitis/tendinitis. Try cold compresses. Follow-up with established ortho Dr. Dodge if persists or worsens.  Orders:    XR hip left with pelvis when performed 2 or 3 views; Future

## 2024-08-06 ENCOUNTER — HOSPITAL ENCOUNTER (OUTPATIENT)
Dept: RADIOLOGY | Facility: CLINIC | Age: 74
Discharge: HOME | End: 2024-08-06
Payer: MEDICARE

## 2024-08-06 DIAGNOSIS — M25.552 LEFT HIP PAIN: ICD-10-CM

## 2024-08-06 PROCEDURE — 73502 X-RAY EXAM HIP UNI 2-3 VIEWS: CPT | Mod: LEFT SIDE | Performed by: RADIOLOGY

## 2024-08-06 PROCEDURE — 73502 X-RAY EXAM HIP UNI 2-3 VIEWS: CPT | Mod: LT

## 2024-08-12 DIAGNOSIS — M96.1 CERVICAL POST-LAMINECTOMY SYNDROME: ICD-10-CM

## 2024-08-12 RX ORDER — OXYCODONE AND ACETAMINOPHEN 10; 325 MG/1; MG/1
1 TABLET ORAL 3 TIMES DAILY PRN
Qty: 84 TABLET | Refills: 0 | Status: SHIPPED | OUTPATIENT
Start: 2024-08-13

## 2024-08-13 ENCOUNTER — TELEPHONE (OUTPATIENT)
Dept: PRIMARY CARE | Facility: CLINIC | Age: 74
End: 2024-08-13
Payer: MEDICARE

## 2024-08-13 NOTE — TELEPHONE ENCOUNTER
Result Communication    Resulted Orders   XR hip left with pelvis when performed 2 or 3 views    Narrative    Interpreted By:  Geovany Siddiqi,   STUDY:  XR HIP LEFT WITH PELVIS WHEN PERFORMED 2 OR 3 VIEWS      INDICATION:  Signs/Symptoms:pain.      COMPARISON:  February 11, 2020      ACCESSION NUMBER(S):  [id number]      ORDERING CLINICIAN:  GERONIMO TAVAREZ      FINDINGS:  Interval development of mild osteoarthritis of the left hip.      Small trochanteric spurs. No evidence of fracture.        Impression    Mild left hip degenerative changes.      Signed by: Geovany Siddiqi 8/10/2024 9:29 AM  Dictation workstation:   [id number]       5:23 PM      Results were successfully communicated with the patient and they acknowledged their understanding.  Result Communication    Resulted Orders   XR hip left with pelvis when performed 2 or 3 views    Narrative    Interpreted By:  Geovany Siddiqi,   STUDY:  XR HIP LEFT WITH PELVIS WHEN PERFORMED 2 OR 3 VIEWS      INDICATION:  Signs/Symptoms:pain.      COMPARISON:  February 11, 2020      ACCESSION NUMBER(S):  [id number]      ORDERING CLINICIAN:  GERONIMO TAVAREZ      FINDINGS:  Interval development of mild osteoarthritis of the left hip.      Small trochanteric spurs. No evidence of fracture.        Impression    Mild left hip degenerative changes.      Signed by: Geovany Siddiqi 8/10/2024 9:29 AM  Dictation workstation:   [id number]       5:22 PM      Results were successfully communicated with the patient and they acknowledged their understanding.

## 2024-09-03 ENCOUNTER — OFFICE VISIT (OUTPATIENT)
Dept: PRIMARY CARE | Facility: CLINIC | Age: 74
End: 2024-09-03
Payer: MEDICARE

## 2024-09-03 VITALS
HEART RATE: 63 BPM | DIASTOLIC BLOOD PRESSURE: 75 MMHG | OXYGEN SATURATION: 95 % | SYSTOLIC BLOOD PRESSURE: 124 MMHG | BODY MASS INDEX: 36.09 KG/M2 | WEIGHT: 252.13 LBS | HEIGHT: 70 IN | TEMPERATURE: 98.1 F

## 2024-09-03 DIAGNOSIS — F11.20 CONTINUOUS OPIOID DEPENDENCE (MULTI): ICD-10-CM

## 2024-09-03 DIAGNOSIS — E66.01 CLASS 2 SEVERE OBESITY DUE TO EXCESS CALORIES WITH SERIOUS COMORBIDITY AND BODY MASS INDEX (BMI) OF 36.0 TO 36.9 IN ADULT (MULTI): ICD-10-CM
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
## 2024-09-03 DIAGNOSIS — I50.9 CHRONIC CONGESTIVE HEART FAILURE, UNSPECIFIED HEART FAILURE TYPE (MULTI): ICD-10-CM

## 2024-09-03 DIAGNOSIS — H69.91 DYSFUNCTION OF RIGHT EUSTACHIAN TUBE: Primary | ICD-10-CM

## 2024-09-03 PROBLEM — H93.8X1 CONGESTION OF RIGHT EAR: Status: ACTIVE | Noted: 2024-09-03

## 2024-09-03 PROCEDURE — 1159F MED LIST DOCD IN RCRD: CPT | Performed by: FAMILY MEDICINE

## 2024-09-03 PROCEDURE — 1036F TOBACCO NON-USER: CPT | Performed by: FAMILY MEDICINE

## 2024-09-03 PROCEDURE — 3074F SYST BP LT 130 MM HG: CPT | Performed by: FAMILY MEDICINE

## 2024-09-03 PROCEDURE — 3078F DIAST BP <80 MM HG: CPT | Performed by: FAMILY MEDICINE

## 2024-09-03 PROCEDURE — 99214 OFFICE O/P EST MOD 30 MIN: CPT | Performed by: FAMILY MEDICINE

## 2024-09-03 ASSESSMENT — ENCOUNTER SYMPTOMS
SINUS PAIN: 0
SHORTNESS OF BREATH: 0
PALPITATIONS: 0
CHILLS: 0
CHEST TIGHTNESS: 0
TROUBLE SWALLOWING: 0
LIGHT-HEADEDNESS: 0
DIZZINESS: 0
RHINORRHEA: 0
SINUS PRESSURE: 0
DIAPHORESIS: 0
CHOKING: 0
COUGH: 0
HEADACHES: 0
WHEEZING: 0
SORE THROAT: 0
FEVER: 0
VOICE CHANGE: 0
APNEA: 0

## 2024-09-03 NOTE — PATIENT INSTRUCTIONS
Please follow-up with your cardiologist regarding this fall. If Dr. Osman doesn't feel that this episode is related to the heart, please follow-up with a neurologist due to your history of transient global amnesia.    Adult Neurology [phone]  Tri Archer PA-C, Dr. Koffi Mckeon, Dr. Lev Casillas ([place])  Dr. Ruthann Tenorio ([place])  Dr. Ashkan Brooke, Dr. Aliza Méndez ([place])  Dr. Gem Box ([place])  Dr. Rinku Camejo, Dr. Victorina Ware, et al. ([place])  Dr. Bryson Daugherty, Dr. Quan Nieves, Olive Moreira CNP, et al. ([place] [phone])    Try a nasal steroid spray for a couple weeks for the ear.    Please return or seek medical attention if symptoms persist, change, worsen, or return. For emergencies, call 9-1-1 or go to the nearest Emergency Room. Please schedule additional problem-focused appointment(s) to address additional problem(s).    Avoid taking Biotin (a vitamin, shows up particularly in hair/nail supplements) for a week prior to any blood tests, as it can interfere with certain results. Fasting for labs means 12 hours, nothing to eat or drink, except water and medications, unless directed otherwise.    For assistance with scheduling referrals or consultations, please call [phone]. For laboratory, radiology, and other tests, please call [phone] ([phone] for pediatrics). Please review prescription inserts and published information for possible adverse effects of all medications. Return after testing or consultation to review results and recommendations, if symptoms persist, change, worsen, or return, or if you have any question or concern. If you do not get results within 7-10 days, or you have any question or concern, please send a message, call the office ([phone]), or return to the office for a follow-up appointment. For non-emergencies, you may call the office. For emergencies, call 9-1-1 or go to the nearest Emergency Department. Please schedule  additional appointment(s) to address concern(s) not addressed today.    In general, results are not released or discussed over the telephone, but at an appointment or via  [employer]. Results of tests done through [facility] are released via  [employer] (see below).  [email or web address]   [employer] support line: [phone]

## 2024-09-03 NOTE — PROGRESS NOTES
"Subjective   Patient ID: Phuc Cheek is a 74 y.o. male h/o transient global amnesia, who presents for Ear Fullness (Pt presents c/o R near feeling full of water. c/o fell a few weeks ago not sure if that's why it happened. BL).  HPI Historian(s): Self    c/o right ear started bothering him about 3w ago. Off and on. Can make his ear \"click\" by manipulating his external ear.    Fell a few weeks ago. Was transferring bricks from one spot to another. Lots of bending over, straightening upright. Just this single episode. Denies any sensation of dizziness, motion, spinning, lightheadedness/faintness. Denies Loc, but can not recall actually falling. No confusion.    Review of Systems   Constitutional:  Negative for chills, diaphoresis and fever.   HENT:  Positive for congestion (chronic unchanged) and hearing loss (right ear feels clogged). Negative for ear pain, postnasal drip, rhinorrhea, sinus pressure, sinus pain, sore throat, tinnitus, trouble swallowing and voice change.    Eyes:  Negative for visual disturbance.   Respiratory:  Negative for apnea (no PND), cough, choking, chest tightness, shortness of breath and wheezing.    Cardiovascular:  Negative for chest pain, palpitations and leg swelling.   Neurological:  Negative for dizziness, syncope, light-headedness and headaches.   All other systems reviewed and are negative.      Patient Care Team:  Salvador Rachel DO as PCP - General (Family Medicine)  ALMAZ Juares-CNP as PCP - [facility] Attributed Provider  Cecilio Osman MD as Referring Physician (Cardiology)  Osvaldo Kuhn MD as Anesthesiologist (Pain Medicine)  Marnie Chavez MD as Referring Physician (Endocrinology)  Ya Maldonado MD as Referring Physician (Internal Medicine)  Vale Johnson (Neurology)  Jayce Valdez DO as Surgeon (Orthopaedic Surgery)  Ruy Steiner MD as Referring Physician (Internal Medicine)    Objective   /75 (Patient Position: 1 minute standing)   " "Pulse 63   Temp 36.7 °C (98.1 °F)   Ht 1.778 m (5' 10\")   Wt 114 kg (252 lb 2 oz)   SpO2 95%   BMI 36.18 kg/m²         Physical Exam  Vitals and nursing note reviewed.   Constitutional:       General: He is not in acute distress.     Appearance: Normal appearance.      Comments: No assistive device presently being used.   HENT:      Head: Normocephalic and atraumatic.      Right Ear: Tympanic membrane, ear canal and external ear normal. There is no impacted cerumen.      Left Ear: Tympanic membrane, ear canal and external ear normal. There is no impacted cerumen.   Eyes:      General: No scleral icterus.     Extraocular Movements: Extraocular movements intact.      Conjunctiva/sclera: Conjunctivae normal.   Pulmonary:      Effort: Pulmonary effort is normal. No respiratory distress.   Skin:     General: Skin is warm and dry.      Coloration: Skin is not jaundiced.   Neurological:      Mental Status: He is alert and oriented to person, place, and time. Mental status is at baseline.      Cranial Nerves: Cranial nerves 2-12 are intact.      Motor: No weakness, tremor or abnormal muscle tone.      Coordination: Coordination is intact. Romberg sign negative. Coordination normal. Finger-Nose-Finger Test and Heel to Shin Test normal.      Gait: Gait is intact.   Psychiatric:         Behavior: Behavior normal.         Assessment & Plan  Dysfunction of right eustachian tube  Try nasal steroid spray.       Fall in home, initial encounter  Unclear etiology, but suspect orthostatic lightheadedness and near syncope related to postural changes. Orthostatic VS today (normal). Follow-up with established cardiologist. DDx: simply tripping/mechanical, BPPV. Avoid similar activities. Doubt CNS etiology, but follow-up with neurology if not thought to be cardiac, due to h/o transient global amnesia.       Continuous opioid dependence (Multi)  Per pain management.       Chronic congestive heart failure, unspecified heart failure " type (Multi)  Continue with cardiology.       Class 2 severe obesity due to excess calories with serious comorbidity and body mass index (BMI) of 36.0 to 36.9 in adult (Multi)  Therapeutic lifestyle changes.

## 2024-09-10 ENCOUNTER — OFFICE VISIT (OUTPATIENT)
Dept: PAIN MEDICINE | Facility: CLINIC | Age: 74
End: 2024-09-10
Payer: MEDICARE

## 2024-09-10 VITALS
DIASTOLIC BLOOD PRESSURE: 84 MMHG | SYSTOLIC BLOOD PRESSURE: 147 MMHG | RESPIRATION RATE: 17 BRPM | HEART RATE: 60 BPM | OXYGEN SATURATION: 94 %

## 2024-09-10 DIAGNOSIS — M47.812 CERVICAL SPONDYLOSIS: Primary | ICD-10-CM

## 2024-09-10 DIAGNOSIS — M96.1 CERVICAL POST-LAMINECTOMY SYNDROME: ICD-10-CM

## 2024-09-10 DIAGNOSIS — M47.816 LUMBAR SPONDYLOSIS: ICD-10-CM

## 2024-09-10 DIAGNOSIS — M51.16 LUMBAR DISC DISEASE WITH RADICULOPATHY: ICD-10-CM

## 2024-09-10 DIAGNOSIS — F11.90 CHRONIC, CONTINUOUS USE OF OPIOIDS: ICD-10-CM

## 2024-09-10 PROCEDURE — 3077F SYST BP >= 140 MM HG: CPT | Performed by: NURSE PRACTITIONER

## 2024-09-10 PROCEDURE — 1160F RVW MEDS BY RX/DR IN RCRD: CPT | Performed by: NURSE PRACTITIONER

## 2024-09-10 PROCEDURE — 99213 OFFICE O/P EST LOW 20 MIN: CPT | Performed by: NURSE PRACTITIONER

## 2024-09-10 PROCEDURE — 1036F TOBACCO NON-USER: CPT | Performed by: NURSE PRACTITIONER

## 2024-09-10 PROCEDURE — 3079F DIAST BP 80-89 MM HG: CPT | Performed by: NURSE PRACTITIONER

## 2024-09-10 PROCEDURE — 1125F AMNT PAIN NOTED PAIN PRSNT: CPT | Performed by: NURSE PRACTITIONER

## 2024-09-10 PROCEDURE — 1159F MED LIST DOCD IN RCRD: CPT | Performed by: NURSE PRACTITIONER

## 2024-09-10 RX ORDER — OXYCODONE AND ACETAMINOPHEN 10; 325 MG/1; MG/1
1 TABLET ORAL 3 TIMES DAILY PRN
Qty: 84 TABLET | Refills: 0 | Status: SHIPPED | OUTPATIENT
Start: 2024-10-08 | End: 2024-11-05

## 2024-09-10 RX ORDER — METHOCARBAMOL 750 MG/1
750 TABLET, FILM COATED ORAL 3 TIMES DAILY PRN
Qty: 90 TABLET | Refills: 2 | Status: SHIPPED | OUTPATIENT
Start: 2024-09-10

## 2024-09-10 RX ORDER — OXYCODONE AND ACETAMINOPHEN 10; 325 MG/1; MG/1
1 TABLET ORAL 3 TIMES DAILY PRN
Qty: 84 TABLET | Refills: 0 | Status: SHIPPED | OUTPATIENT
Start: 2024-09-10

## 2024-09-10 RX ORDER — ROPINIROLE 1 MG/1
1 TABLET, FILM COATED ORAL 3 TIMES DAILY
Qty: 90 TABLET | Refills: 2 | Status: CANCELLED | OUTPATIENT
Start: 2024-09-10 | End: 2024-12-09

## 2024-09-10 RX ORDER — OXYCODONE AND ACETAMINOPHEN 10; 325 MG/1; MG/1
1 TABLET ORAL 3 TIMES DAILY PRN
Qty: 84 TABLET | Refills: 0 | Status: SHIPPED | OUTPATIENT
Start: 2024-11-05 | End: 2024-12-03

## 2024-09-10 ASSESSMENT — ENCOUNTER SYMPTOMS
PAIN: 1
HEMATOLOGIC/LYMPHATIC NEGATIVE: 1
CONSTITUTIONAL NEGATIVE: 1
LOSS OF SENSATION IN FEET: 0
NECK PAIN: 1
RESPIRATORY NEGATIVE: 1
ALLERGIC/IMMUNOLOGIC NEGATIVE: 1
HEADACHES: 0
JOINT SWELLING: 0
LIGHT-HEADEDNESS: 0
FACIAL ASYMMETRY: 0
CARDIOVASCULAR NEGATIVE: 1
PSYCHIATRIC NEGATIVE: 1
EYES NEGATIVE: 1
WEAKNESS: 0
GASTROINTESTINAL NEGATIVE: 1
MYALGIAS: 1
ENDOCRINE NEGATIVE: 1
NUMBNESS: 0
ARTHRALGIAS: 1
DEPRESSION: 1
DIZZINESS: 0
NECK STIFFNESS: 1
OCCASIONAL FEELINGS OF UNSTEADINESS: 0
SPEECH DIFFICULTY: 0
SEIZURES: 0
BACK PAIN: 1
TREMORS: 0

## 2024-09-10 ASSESSMENT — PAIN SCALES - GENERAL: PAINLEVEL: 9

## 2024-09-10 NOTE — PROGRESS NOTES
Subjective   Patient ID: Phuc Cheek is a 74 y.o. male who presents for Pain and Med Refill (Low Back/Neck ).  Pain  Pertinent negatives include no headaches, joint swelling or weakness.   Med Refill  Associated symptoms include arthralgias, myalgias and neck pain. Pertinent negatives include no headaches, joint swelling, numbness or weakness.          Phuc follows up for interval reevaluation of his chronic pain of cervicalgia from cervical postlaminectomy syndrome, low back pain from lumbar spondylosis, lumbar degenerative disc with radiculitis and left knee pain from arthritis.    Prednisone from last office visit did help to reduce pain and improve function up to 90% to the right posterior shoulder and arm along with numbness, tingling and weakness.  Pain to that area now is a 1-2 out of 10.    Over the last 6 weeks, Phuc is with increased left-sided posterior cervical pain with bending and twisting.  It can be as high as an 8-9 out of a 10 with prolonged sitting and working on the computer and also driving.  Also has this pain in the morning hours or any prolonged position.  There is no pain radiating down the shoulder or arm.    Discussed benefits and risks of medial branch blocks of the cervical facet joints on the left at C3, C4 and C5 for cervical spondylosis.  Right now wants to hold off.    History of left-sided C3, C4 and C5 February 6, 2018 reduce pain and improve function up to 90% for several days.  Unfortunately pain returned.    Continues with the Percocet as prescribed 10 mg 3 times daily.  Denies negative side effects from this medication.  Average pain score is 4 out of 10 with this medication.    Toxicology consistent July 9, 2024.  Annual controlled substance agreement and opioid risk tool are completed and scanned into the chart January 9, 2024.    Requip was not effective in reducing pain or improving function for restless legs.  Had upset stomach for medication.  Discontinued  medication.  Upset stomach resolved.    For continuity:   Given the patient's report of reduced pain and improved functional ability without adverse effects, it is reasonable to treat with narcotic medications. The terms of the opioid agreement as well as the potential risks and adverse effects of the patient's medication regimen were discussed in detail. This includes if applicable due to dosage of medication permission to discuss and coordinate care with other treatment providers relevant to the patients condition. The patient verbalized understanding.     Risks and side effects of chronic opioid therapy including but not limited to tolerance, dependence, constipation, hyperalgesia, cognitive side effects, addiction and possible death due to overuse and or misuse were discussed. I also discussed that such medications when co-administered with other sedative agents including but not limited to alcohol, benzodiazepines, sedative hypnotics and illegal drugs could pose life threatening consequences including death. I also explained the impact that the administration of such medication has on a patient with obstructive sleep apnea and continued recommendations for use of apnea devices if ordered are prescribed by other physicians. In order to effectively and safely treat the pain, I also emphasized the importance of compliance with the treatment plan, as well as compliance with the terms of the opioid agreement, which was reviewed in detail. I explained the importance of being responsible with the medications and to take these only as prescribed, never in excess and never for reasons other than pain reduction. The patient was counseled on keeping the medications safe and locked away from children and other adults as well as disposal methods and options. The patient understood the risks and instructions.     I also discussed with the patient in detail that based on the clinical response to the opioid medications and  improvements of activities of daily living, sleep, and work performance in light of compliance with the treatment plan we can continue this form of therapy for the above chronic pain. The goal and rationale used for current treatment with chronic opioid medication is to control the pain and alleviate disability induced by the chronic pain condition noted above after failures of other non-opioid and nonpharmacological modalities to treat the chronic pain and the symptoms associated with have failed. The patient understood the goals in terms of the above treatment plan and had no further questions prior to leaving the office today.     Of note, the above-mentioned diagnoses/conditions and expected fluctuating nature of pain, and pain characteristic changes may lead to prolonged functional impairment requiring frequent and multiple reassessments with continued high level medical decision making. As noted, medication and medication management may require opiate therapy in excess of a routine less than 30 day medication requirement. The patient may require daily opiate therapy necessitating month-long prescription medication as noted above in order to perform activities of daily living and achieve acceptable quality of life with respect to their chronic pain condition for the foreseeable future. We monitor our patient's carefully through drug monitoring, medication counts, urine drug testing specific to their medication as well as a myriad of other substances and with frequent follow-ups with interval reassement of the chronic pain condition, its pathophysiology and prognosis.     The level of clinical decision making at this office visit is high due to high risks and complications including mortality and morbidity related to acute and chronic pain with respects to life, bodily function, and treatment. Risks and clinical decisions with respect to under treatment, failure to maintain adequate treatment, and/or  overtreatment complications and outcomes were discussed with the patient with respect to their chronic pain conditions, interventional therapies, as well as the use of various medications including possible controlled/dangerous medications. The amount and complexity of reviewed data at this in subsequent office visits is high given patient's fluctuating clinical presentation, laboratory and radiographic reports, prescription monitoring program data, and medication history as well as other relevant data as noted above. Pertinent negatives and positives data was used in consideration for the above-mentioned high complexity.      Given the patient's total MED, general use of daily opiates, or other coadministered medications in various classes the patient was offered a prescription for Narcan. I instructed the patient that it is important that patient fill this medication in order to demonstrate understanding of the gravity of possible side effects including respiratory depression and risk of overdose of this opiate load or medication combination. As such patient will be required to bring Narcan prescription to follow-up appointments as part of compliance with continued opiate care.     With respect to opiate induced constipation I discussed multiple ways to combat this problem including staying hydrated and taking over-the-counter medications such as Dulcolax, Miralax and Senna. If these treatments are not effective we could consider such medications as Amitiza, Linzess and Movantik.     Disclaimer: This note was transcribed using an audio transcription device. As such, minor errors may be present with regard to spelling, punctuation, and inadvertent word insertion. Please disregard such errors.    Narrative & Impression   Interpreted By:  Sandoval Alejandro,   STUDY:  MR CERVICAL SPINE WO IV CONTRAST;  3/8/2024 4:24 pm      INDICATION:  Signs/Symptoms:cervical pain.      COMPARISON:  MRI of the cervical spine  dated 09/14/2022; CT of the cervical spine  dated 09/29/2023;      ACCESSION NUMBER(S):  [id number]      ORDERING CLINICIAN:  GERONIMO TAVAREZ      TECHNIQUE:  Sagittal T1, T2, STIR, axial T1 and axial T2 weighted images were  acquired through the cervical spine.      FINDINGS:  Postsurgical changes of in osseous interbody fusion extending from C5  through C7 with intervertebral disc spacer at C4-C5 are again  demonstrated, similar in appearance to prior MRI in September of 2022.      Cervical vertebral alignment is maintained, without evidence of  significant spondylolisthesis.      Cervical vertebral body heights are preserved without evidence of  compression fractures. No new STIR hyperintense edema is present in  the cervical vertebral marrow.      Mild STIR hyperintense edema is present in the pedicles of C7 on the  left, likely due to underlying degenerate facet osteoarthropathy.  Facet joints themselves are preserved without evidence of subluxation  or perching.      Craniocervical junction is intact with degenerative changes at the  atlantoaxial articulation extending into the anterior epidural space  at the level of C1 and somewhat efface in the adjacent subarachnoid  space.      Cervical spinal cord does not demonstrate any intramedullary cord  signal changes.      C2-C3: No significant spinal canal stenosis is evident. Mild  left-sided neural foraminal narrowing is present due to uncovertebral  and facet joint hypertrophy, similar in appearance to prior exam.      C3-C4: Combination of mild circumferential disc bulging and  ligamentum flavum thickening efface the subarachnoid space, without  significant spinal canal narrowing. There is moderate right-sided and  mild-to-moderate left-sided neural foraminal stenosis due to  uncovertebral and facet joint hypertrophy, similar in appearance to  prior exam.      C4-C5: Posterior osteophytic spurring slightly deforms the anterior  subarachnoid space without  spinal canal narrowing. Mild bilateral  neural foraminal stenosis is present due to uncovertebral and facet  joint hypertrophy, similar in appearance to prior exam.      C5-C6: No significant posterior disc contour abnormality is present.  No significant neural foraminal stenosis is evident.      C6-C7: No significant posterior disc contour abnormality or spinal  canal stenosis is present. Mild bilateral neural foraminal narrowing  is noted due to uncovertebral and facet joint hypertrophy.      C7-T1: There is some effacement of the anterior subarachnoid space  due to spondylolisthesis and dorsal subarachnoid space due to  ligamentum flavum thickening with mild underlying spinal canal  narrowing, similar in appearance to prior examination. Mild bilateral  neural foraminal stenosis is present due to uncovertebral and facet  joint hypertrophy, similar to prior study.      Prevertebral and paraspinal soft tissues do not demonstrate any acute  abnormality.      IMPRESSION:  1.  Multilevel degenerative changes are again demonstrated in the  cervical spine, not significantly progressed since prior exam in  September of 2022, although there is persistent mild spinal canal  narrowing present at the level of C7-T1 due to ligamentum flavum  thickening and disc osteophyte complex. Neural foraminal degenerative  changes are also similar in appearance to prior exam.  2. Postsurgical changes of interbody fusion extending from C5 through  C7 with intervertebral disc spacer at C4-C5 again demonstrated,  similar in appearance to prior MRI in September of 2022.      MACRO:  None      Signed by: Sandoval Alejandro 3/10/2024 4:40 PM  Dictation workstation:   [id number]     Narrative & Impression   MRN: [id number]  Patient Name: HEATHER GUERRERO     STUDY:  MRI T-SPINE WO;  12/19/2022 10:05 am     INDICATION:  mid back pain  M54.6: Back pain, thoracic.     COMPARISON:  11/20/2013.     ACCESSION NUMBER(S):  [id number]     ORDERING  CLINICIAN:  GONZALEZ BA     TECHNIQUE:  Sagittal T1, T2, STIR and axial T2 and T1 weighted MR images of the  thoracic spine were obtained.     FINDINGS:  Counting was performed on the  image. Alignment, vertebral body  heights and marrow signal pattern are within normal limits. There  appears to be fusion across the disc spaces at C5-C6 and C6-C7. There  is desiccated disc signal throughout the thoracic spine. Moderate  disc height loss at T3-T4 and mild disc height loss at T4-T5, T5-T6,  T6-T7. The thoracic cord is unremarkable. Incompletely evaluated T2  hyperintense lesion within the right kidney likely represents a cyst.     Mild degenerative changes at C7-T1 through T2-T3 without significant  spinal canal stenosis. Mild neural foraminal narrowing at these  levels.     At T3-T4 there is a right paracentral disc protrusion and mild facet  arthrosis with mild spinal canal stenosis and no significant neural  foraminal stenosis. There is contouring of the thoracic cord without  convincing evidence of abnormal cord signal.     At T4-T5 through T7-T8 there are mild degenerative changes without  significant spinal canal or neural foraminal stenosis.     At T8-T9 there is a small left paracentral disc protrusion and facet  arthrosis without significant spinal canal or right neural foraminal  stenosis. Mild left neural foraminal stenosis. There is contouring of  the ventral thoracic cord without convincing evidence of abnormal  cord signal.     T9-T10 through T12-L1 without significant spinal canal or neural  foraminal stenosis.     IMPRESSION:  Mild degenerative changes throughout the thoracic spine most  pronounced at T3-T4 with mild spinal canal stenosis. There is  contouring of the ventral thoracic cord without convincing evidence  of abnormal cord signal. Degenerative changes of the thoracic spine  are similar to minimally since the prior exam 11/20/2013.     Narrative & Impression   MRN: [id number]  Patient Name:  HEATHER GUERRERO     STUDY:  MRI L-SPINE WO;  1/20/2022 12:16 pm     INDICATION:  Low Back Pain  M47.816: Lumbar spondylosis M51.36: Lumbar disc  narrowing M54.16: Lumbar radiculopathy.     COMPARISON:  August 2000.     ACCESSION NUMBER(S):  [id number]     ORDERING CLINICIAN:  CESAR JOHNSON     TECHNIQUE:  The lumbar spine was studied in the sagital, axial and coronal planes  utiliing T1 and T2 weighted images.     FINDINGS:  The marrow signal and vertebral body height are normal. The conus and  sacrum are normal.  Images at each interspace reveal the following:  L1/L2  There is normal alignment and vertebral body height. The disc space  is normal. There is no evidence of canal or foraminal narrowing.  There is no evidence of bulging or herniated disc.  L2/L3  Mild bulging disc and facet hypertrophy without canal or foraminal  narrowing  L3/L4  Circumferential bulging intervertebral disc. Bilateral facet  hypertrophy. No measurable canal stenosis. Asymmetrical left-sided  foraminal narrowing  L4/L5  Bulging intervertebral disc. Bilateral facet hypertrophy. No  measurable canal stenosis. Moderate/advanced bilateral foraminal  narrowing.  L5/S1  Bilateral facet hypertrophy without canal or foraminal narrowing        IMPRESSION:  * Lumbar spondylosis as described  *No measurable change compared to the previous exam.     The examination was interpreted [facility]       Review of Systems   Constitutional: Negative.    HENT: Negative.     Eyes: Negative.    Respiratory: Negative.     Cardiovascular: Negative.    Gastrointestinal: Negative.    Endocrine: Negative.    Genitourinary: Negative.    Musculoskeletal:  Positive for arthralgias, back pain, gait problem, myalgias, neck pain and neck stiffness. Negative for joint swelling.   Skin: Negative.    Allergic/Immunologic: Negative.    Neurological:  Negative for dizziness, tremors, seizures, syncope, facial asymmetry, speech difficulty, weakness,  light-headedness, numbness and headaches.   Hematological: Negative.    Psychiatric/Behavioral: Negative.         Objective   Physical Exam  Vitals and nursing note reviewed.   Constitutional:       Appearance: Normal appearance.   HENT:      Head: Normocephalic and atraumatic.   Eyes:      Conjunctiva/sclera: Conjunctivae normal.   Pulmonary:      Effort: Pulmonary effort is normal. No respiratory distress.   Musculoskeletal:      Right lower leg: No edema.      Left lower leg: No edema.      Comments: Axial rotation extension of the cervical facet joints on the left at C3, C4 and C5 increases pain.  Tenderness to palpation also to these joints.   Skin:     General: Skin is warm and dry.      Capillary Refill: Capillary refill takes 2 to 3 seconds.   Neurological:      Mental Status: He is alert and oriented to person, place, and time.      Cranial Nerves: No cranial nerve deficit.      Sensory: No sensory deficit.      Motor: No weakness.      Gait: Gait normal.   Psychiatric:         Behavior: Behavior normal.         Phuc was seen today for pain and med refill.  Diagnoses and all orders for this visit:  Cervical spondylosis (Primary)  Cervical post-laminectomy syndrome  -     methocarbamol (Robaxin) 750 mg tablet; Take 1 tablet (750 mg) by mouth 3 times a day as needed for muscle spasms.  -     oxyCODONE-acetaminophen (Percocet)  mg tablet; Take 1 tablet by mouth 3 times a day as needed for severe pain (7 - 10).  -     oxyCODONE-acetaminophen (Percocet)  mg tablet; Take 1 tablet by mouth 3 times a day as needed for severe pain (7 - 10) for up to 28 days. Do not fill before October 8, 2024.  -     oxyCODONE-acetaminophen (Percocet)  mg tablet; Take 1 tablet by mouth 3 times a day as needed for severe pain (7 - 10) for up to 28 days. Do not fill before November 5, 2024.  Lumbar disc disease with radiculopathy  Lumbar spondylosis  Chronic, continuous use of opioids          Follow-up 12  weeks.    Ginny Amaral, ALMAZ-CNP 09/10/24 10:11 AM

## 2024-09-17 ENCOUNTER — APPOINTMENT (OUTPATIENT)
Dept: PAIN MEDICINE | Facility: CLINIC | Age: 74
End: 2024-09-17
Payer: MEDICARE

## 2024-12-02 ENCOUNTER — OFFICE VISIT (OUTPATIENT)
Dept: PAIN MEDICINE | Facility: CLINIC | Age: 74
End: 2024-12-02
Payer: MEDICARE

## 2024-12-02 VITALS
RESPIRATION RATE: 18 BRPM | HEART RATE: 62 BPM | SYSTOLIC BLOOD PRESSURE: 161 MMHG | OXYGEN SATURATION: 95 % | DIASTOLIC BLOOD PRESSURE: 89 MMHG

## 2024-12-02 DIAGNOSIS — M96.1 POSTLAMINECTOMY SYNDROME, CERVICAL REGION: ICD-10-CM

## 2024-12-02 DIAGNOSIS — M48.062 LUMBAR STENOSIS WITH NEUROGENIC CLAUDICATION: ICD-10-CM

## 2024-12-02 DIAGNOSIS — M96.1 CERVICAL POST-LAMINECTOMY SYNDROME: ICD-10-CM

## 2024-12-02 DIAGNOSIS — M96.1 POSTLAMINECTOMY SYNDROME OF LUMBAR REGION: ICD-10-CM

## 2024-12-02 PROCEDURE — 99214 OFFICE O/P EST MOD 30 MIN: CPT | Performed by: ANESTHESIOLOGY

## 2024-12-02 PROCEDURE — 3077F SYST BP >= 140 MM HG: CPT | Performed by: ANESTHESIOLOGY

## 2024-12-02 PROCEDURE — 3079F DIAST BP 80-89 MM HG: CPT | Performed by: ANESTHESIOLOGY

## 2024-12-02 PROCEDURE — 1159F MED LIST DOCD IN RCRD: CPT | Performed by: ANESTHESIOLOGY

## 2024-12-02 RX ORDER — OXYCODONE AND ACETAMINOPHEN 10; 325 MG/1; MG/1
1 TABLET ORAL 3 TIMES DAILY PRN
Qty: 84 TABLET | Refills: 0 | Status: CANCELLED | OUTPATIENT
Start: 2024-12-31 | End: 2025-01-28

## 2024-12-02 RX ORDER — OXYCODONE HYDROCHLORIDE 10 MG/1
10 TABLET ORAL 3 TIMES DAILY PRN
Qty: 84 TABLET | Refills: 0 | Status: SHIPPED | OUTPATIENT
Start: 2025-01-27 | End: 2025-02-24

## 2024-12-02 RX ORDER — OXYCODONE HYDROCHLORIDE 10 MG/1
10 TABLET ORAL 3 TIMES DAILY PRN
Qty: 84 TABLET | Refills: 0 | Status: SHIPPED | OUTPATIENT
Start: 2024-12-03 | End: 2024-12-31

## 2024-12-02 RX ORDER — OXYCODONE AND ACETAMINOPHEN 10; 325 MG/1; MG/1
1 TABLET ORAL 3 TIMES DAILY PRN
Qty: 84 TABLET | Refills: 0 | Status: CANCELLED | OUTPATIENT
Start: 2024-12-03 | End: 2024-12-31

## 2024-12-02 RX ORDER — OXYCODONE HYDROCHLORIDE 10 MG/1
10 TABLET ORAL 3 TIMES DAILY PRN
Qty: 84 TABLET | Refills: 0 | Status: SHIPPED | OUTPATIENT
Start: 2024-12-30 | End: 2025-01-27

## 2024-12-02 RX ORDER — OXYCODONE AND ACETAMINOPHEN 10; 325 MG/1; MG/1
1 TABLET ORAL 3 TIMES DAILY PRN
Qty: 84 TABLET | Refills: 0 | Status: CANCELLED | OUTPATIENT
Start: 2025-01-28

## 2024-12-02 ASSESSMENT — PAIN SCALES - GENERAL: PAINLEVEL_OUTOF10: 10 - WORST POSSIBLE PAIN

## 2024-12-02 ASSESSMENT — PAIN - FUNCTIONAL ASSESSMENT: PAIN_FUNCTIONAL_ASSESSMENT: 0-10

## 2024-12-02 NOTE — PROGRESS NOTES
History Of Present Illness  Phuc Cheek is a 74 y.o. male presenting with low back pain radiating to the posterior aspect of left buttock.  Patient describes the pain as sharp shooting in nature not associated with any numbness or weakness in the left lower extremity.  Factors that exacerbate the pain include standing and walking and is relieved by sitting.  Patient also reports that his pain gets worse laying on his left side.  No history of any catching in the left hip.  Left hip pain is exacerbated with change in position.  No history of any bowel or bladder incontinence.  MRI lumbosacral spine done in 2022 moderate bilateral foraminal narrowing.  Patient underwent L4-L5 interlaminar epidural steroid injection in 2022 that provided good relief.  X-ray of left hip done in August 2024 showed mild left hip degenerative changes.  Current pain medications include Percocet 10 mg/325 1 tablet 3 times daily methocarbamol 750 3 times daily.  Patient reports 50% relief on the current regimen.  Patient is doing home core muscle strengthening exercises.     Past Medical History  He has a past medical history of Acute bronchitis (02/16/2024), Arthritis (Unknown), Basal cell carcinoma of skin, unspecified, Cellulitis of lower extremity (02/16/2024), CHF (congestive heart failure) (Multi) (2014), Disease due to severe acute respiratory syndrome coronavirus 2 (SARS-CoV-2) (04/11/2023), Diverticulosis of intestine, part unspecified, without perforation or abscess without bleeding, Eczema (04/11/2023), Heart disease (1999), Hematuria (02/16/2024), HLD (hyperlipidemia) (04/11/2023), Hypertension (1999), Myocardial infarction (Multi) (11/5/2014), Noninfective gastroenteritis and colitis, unspecified, Other abnormal glucose, Personal history of (healed) traumatic fracture, Personal history of other diseases of the circulatory system, Personal history of other diseases of the digestive system, Personal history of other diseases  of the digestive system, Personal history of other diseases of the digestive system, Personal history of other diseases of the musculoskeletal system and connective tissue, Personal history of other diseases of the nervous system and sense organs, Personal history of other diseases of the respiratory system, Personal history of other medical treatment, Personal history of other specified conditions, Personal history of urinary calculi, Restless legs syndrome, Right lower quadrant abdominal pain (02/24/2023), Scalp hematoma (02/16/2024), Sore throat (04/11/2023), Thrush, oral (04/11/2023), Umbilical hernia without obstruction or gangrene, Unspecified atherosclerosis, and Varicella (unknown).    Surgical History  He has a past surgical history that includes Tonsillectomy (07/22/2016); Cervical fusion (07/22/2016); Hand tendon surgery (07/22/2016); Knee surgery (07/22/2016); Other surgical history (07/22/2016); Coronary artery bypass graft (07/22/2016); Other surgical history (10/08/2021); Other surgical history (02/17/2017); Esophagogastroduodenoscopy (02/17/2017); Splenectomy, total (02/17/2017); Colonoscopy (02/17/2017); Other surgical history (02/17/2017); Other surgical history (02/17/2017); Other surgical history (02/17/2017); Hemorrhoid surgery (02/17/2017); Appendectomy (02/17/2017); Other surgical history (02/17/2017); Other surgical history (02/17/2017); Other surgical history (02/17/2017); Hernia repair (02/17/2017); MR angio head wo IV contrast (05/28/2016); MR angio neck wo IV contrast (05/28/2016); MR angio head wo IV contrast (05/03/2018); MR angio neck wo IV contrast (05/03/2018); MR angio head wo IV contrast (05/03/2018); Coronary stent placement (1999,2000,2014); Joint replacement (4/29/2014 &3/14/2022 knees); Sinus surgery (10/29/2014); Spine surgery (1993 & 2018 neck fusion); and Cardiac catheterization (1999,2000).     Social History  He reports that he has never smoked. He has never used smokeless  tobacco. He reports current alcohol use of about 1.0 - 2.0 standard drink of alcohol per week. He reports that he does not use drugs.    Family History  Family History   Problem Relation Name Age of Onset    Cancer Mother MOM         breast    Heart disease Mother MOM     Breast cancer Mother MOM     Arthritis Mother MOM     Hyperlipidemia Mother MOM     Hypertension Mother MOM     Heart attack Mother MOM     Arthritis Father DAD     Other (bladder cancer) Father DAD     Diabetes Father DAD     Heart disease Father DAD     Other (cabg) Father DAD     Hypertension Father DAD     Heart attack Father DAD     Skin cancer Father DAD     Diabetes Sister Sister     Arthritis Sister Sister     Goiter Sister Sister     Hyperlipidemia Sister Sister     Heart disease Sister Sister only     Accidental death Brother Brother     Arthritis Brother Brother     Hyperlipidemia Brother Brother     Heart disease Brother Brother only         Allergies  Adhesive tape-silicones, Bee venom protein (honey bee), Niacin, Simvastatin, and Tetracyclines    Review of systems:   13-point review of systems done and negative except as noted in HPI      Physical Exam   Vital signs: Reviewed  Constitutional: No acute distress, well appearing and well nourished. Patient appears stated age.   Eyes: Conjunctiva non-icteric and eye lids are without obvious rash or drooping. Pupils are symmetric.   Ears, Nose, Mouth, and Throat: External ears and nose appear to be without deformity or rash. No lesions or masses noted. Hearing is grossly intact.   Neck:. No JVD noted, tracheal position is midline.   Head and Face: Examination of the head and face revealed no abnormalities.   Respiratory: No gasping or shortness of breath noted, no use of accessory muscles noted.   Cardiovascular: Examination for edema is normal.   GI: Abdomen nontender to palpation.   Skin: No rashes or open lesions/ulcers identified on skin.   Musk: Digits/nails show no clubbing or  cyanosis. No asymmetry or masses noted of the musculature. Examination of the muscles/joints/bones show normal range of motion. Gait is grossly normal.  Able to walk on toes and heels.   Neurologic: Cranial nerves II-XII intact, motor strength 5/5 muscle strength of the lower extremities bilaterally and equal. 5/5 muscle strength of the upper extremities bilaterally and equal.   Reflexes: normal.   Sensation: Normal to touch and pinprick lower extremities bilaterally  Provocative tests: Positive straight leg raise left.  Negative Akira bilaterally  Left hip: No tenderness on palpation anterior aspect of hip.  Normal flexion abduction lateral rotation.  Negative logroll sign.     Last Recorded Vitals  /89   Pulse 62   Resp 18   SpO2 95%     Relevant Results            Last OARRS Review: No data recorded    I have personally reviewed the OARRS report for Phuc Cheek I have considered the risks of abuse, dependence, addiction and diversion  Overdoses score is 110.  Morphine milligram: This 45 mg/day     Assessment/Plan   Diagnoses and all orders for this visit:  Cervical post-laminectomy syndrome  Lumbar stenosis with neurogenic claudication  Postlaminectomy syndrome, cervical region  Postlaminectomy syndrome of lumbar region      Plan  X-ray lumbosacral spine with flexion-extension.  Oxycodone 10 mg tablets 1 tablet 3 times daily  Continue methocarbamol 750 mg 1 tablet 3 times daily  Tylenol 500 mg 2 tablets 3 times daily  Schedule patient for L5-S1 interlaminar lumbar epidural steroid injection       Osvaldo Kuhn MD

## 2024-12-04 ENCOUNTER — HOSPITAL ENCOUNTER (OUTPATIENT)
Dept: RADIOLOGY | Facility: CLINIC | Age: 74
Discharge: HOME | End: 2024-12-04
Payer: MEDICARE

## 2024-12-04 DIAGNOSIS — M96.1 POSTLAMINECTOMY SYNDROME OF LUMBAR REGION: ICD-10-CM

## 2024-12-04 PROCEDURE — 72114 X-RAY EXAM L-S SPINE BENDING: CPT

## 2024-12-04 PROCEDURE — 72114 X-RAY EXAM L-S SPINE BENDING: CPT | Performed by: RADIOLOGY

## 2024-12-20 DIAGNOSIS — M96.1 CERVICAL POST-LAMINECTOMY SYNDROME: ICD-10-CM

## 2024-12-20 DIAGNOSIS — G25.81 RESTLESS LEGS SYNDROME: ICD-10-CM

## 2024-12-20 RX ORDER — METHOCARBAMOL 750 MG/1
750 TABLET, FILM COATED ORAL 3 TIMES DAILY PRN
Qty: 90 TABLET | Refills: 2 | Status: SHIPPED | OUTPATIENT
Start: 2024-12-20

## 2024-12-20 RX ORDER — ROPINIROLE 1 MG/1
1 TABLET, FILM COATED ORAL 3 TIMES DAILY
Qty: 90 TABLET | Refills: 2 | Status: SHIPPED | OUTPATIENT
Start: 2024-12-20 | End: 2025-03-20

## 2025-01-09 ENCOUNTER — APPOINTMENT (OUTPATIENT)
Dept: PAIN MEDICINE | Facility: CLINIC | Age: 75
End: 2025-01-09
Payer: MEDICARE

## 2025-01-09 ENCOUNTER — OFFICE VISIT (OUTPATIENT)
Dept: PRIMARY CARE | Facility: CLINIC | Age: 75
End: 2025-01-09
Payer: MEDICARE

## 2025-01-09 VITALS
OXYGEN SATURATION: 95 % | SYSTOLIC BLOOD PRESSURE: 119 MMHG | HEART RATE: 64 BPM | DIASTOLIC BLOOD PRESSURE: 75 MMHG | WEIGHT: 248.25 LBS | TEMPERATURE: 97.7 F | BODY MASS INDEX: 35.62 KG/M2

## 2025-01-09 DIAGNOSIS — J40 BRONCHITIS: Primary | ICD-10-CM

## 2025-01-09 PROCEDURE — 1158F ADVNC CARE PLAN TLK DOCD: CPT | Performed by: FAMILY MEDICINE

## 2025-01-09 PROCEDURE — 1123F ACP DISCUSS/DSCN MKR DOCD: CPT | Performed by: FAMILY MEDICINE

## 2025-01-09 PROCEDURE — 99214 OFFICE O/P EST MOD 30 MIN: CPT | Performed by: FAMILY MEDICINE

## 2025-01-09 PROCEDURE — 3078F DIAST BP <80 MM HG: CPT | Performed by: FAMILY MEDICINE

## 2025-01-09 PROCEDURE — 1160F RVW MEDS BY RX/DR IN RCRD: CPT | Performed by: FAMILY MEDICINE

## 2025-01-09 PROCEDURE — 1159F MED LIST DOCD IN RCRD: CPT | Performed by: FAMILY MEDICINE

## 2025-01-09 PROCEDURE — 3074F SYST BP LT 130 MM HG: CPT | Performed by: FAMILY MEDICINE

## 2025-01-09 PROCEDURE — 1036F TOBACCO NON-USER: CPT | Performed by: FAMILY MEDICINE

## 2025-01-09 RX ORDER — METHYLPREDNISOLONE 4 MG/1
TABLET ORAL
Qty: 21 TABLET | Refills: 0 | Status: SHIPPED | OUTPATIENT
Start: 2025-01-09 | End: 2025-01-15

## 2025-01-09 RX ORDER — BENZONATATE 100 MG/1
100-200 CAPSULE ORAL 3 TIMES DAILY PRN
Qty: 60 CAPSULE | Refills: 0 | Status: SHIPPED | OUTPATIENT
Start: 2025-01-09 | End: 2025-01-19

## 2025-01-09 RX ORDER — AZITHROMYCIN 250 MG/1
TABLET, FILM COATED ORAL
Qty: 6 TABLET | Refills: 0 | Status: SHIPPED | OUTPATIENT
Start: 2025-01-09 | End: 2025-01-14

## 2025-01-09 ASSESSMENT — ENCOUNTER SYMPTOMS
RHINORRHEA: 1
DIARRHEA: 0
FEVER: 0
HEADACHES: 1
SHORTNESS OF BREATH: 1
COUGH: 1

## 2025-01-09 NOTE — PATIENT INSTRUCTIONS
Please return or seek medical attention if symptoms persist, change, worsen, or return. For emergencies, call 9-1-1 or go to the nearest Emergency Room. Please schedule additional problem-focused appointment(s) to address additional problem(s).    Avoid taking Biotin (a vitamin, shows up particularly in hair/nail supplements) for a week prior to any blood tests, as it can interfere with certain results. Fasting for labs means 12 hours, nothing to eat or drink, except water and medications, unless directed otherwise.    For assistance with scheduling referrals or consultations, please call [phone]. For laboratory, radiology, and other tests, please call [phone] ([phone] for pediatrics). Please review prescription inserts and published information for possible adverse effects of all medications. Return after testing or consultation to review results and recommendations, if symptoms persist, change, worsen, or return, or if you have any question or concern. If you do not get results within 7-10 days, or you have any question or concern, please send a message, call the office ([phone]), or return to the office for a follow-up appointment. For non-emergencies, you may call the office. For emergencies, call 9-1-1 or go to the nearest Emergency Department. Please schedule additional appointment(s) to address concern(s) not addressed today. An annual Wellness visit is strongly recommended. A Wellness visit should be dedicated to addressing routine health maintenance matters (e.g., cancer screenings, cardiovascular screening, etc.). Problem-focused visits, typically prompted by symptoms or specific concerns, are usually conducted separately, particularly if multiple or complex problems need to be addressed.    In general, results are not released or discussed over the telephone, but at an appointment or via  [employer]. Results of tests done through [facility] are released via  [employer] (see  below).  [email or web address]  [facility] MyChart support line: [phone]

## 2025-01-09 NOTE — PROGRESS NOTES
Subjective   Patient ID: Phuc Cheek is a 74 y.o. male who presents for Cough (Pt presents c/o cough semi productive, nasal congestion/drainage, headache states mild, no temperatures, no diarrhea, some SOB, x1 wk. No covid swab done.).  HPI Historian(s): Self    Day 7. Denies fever, ill contacts, h/o asthma/COPD/smoking. Tried DayQuil, NyQuil, may have helped somewhat.    Review of Systems   Constitutional:  Negative for fever.   HENT:  Positive for congestion, postnasal drip and rhinorrhea.    Respiratory:  Positive for cough and shortness of breath.    Gastrointestinal:  Negative for diarrhea.   Neurological:  Positive for headaches.     No LMP for male patient.    Patient Care Team:  Salvador Rachel DO as PCP - General (Family Medicine)  Salvador Rachel DO as PCP - [facility] Attributed Provider  Cecilio Osman MD as Referring Physician (Cardiology)  Osvaldo Kuhn MD as Anesthesiologist (Pain Medicine)  Marnie Chavez MD as Referring Physician (Endocrinology)  Ya Maldonado MD as Referring Physician (Internal Medicine)  Vale Johnson (Neurology)  Jayce Valdez DO as Surgeon (Orthopaedic Surgery)  Ruy Steiner MD as Referring Physician (Internal Medicine)    Current Outpatient Medications   Medication Instructions    acetaminophen (TYLENOL) 1,000 mg, Every 6 hours PRN    atenolol (TENORMIN) 25 mg, oral, Daily    azithromycin (Zithromax) 250 mg tablet Take 2 tablets (500 mg) by mouth once daily for 1 day, THEN 1 tablet (250 mg) once daily for 4 days. Take 2 tabs (500 mg) by mouth today, than 1 daily for 4 days..    benzonatate (TESSALON) 100-200 mg, oral, 3 times daily PRN, Do not crush or chew.    cetirizine (ZYRTEC) 10 mg, Daily    cholecalciferol (Vitamin D-3) 50 MCG (2000 UT) tablet 1 each orally once a day    clopidogrel (PLAVIX) 75 mg, oral, Daily    cyanocobalamin (Vitamin B-12) 500 mcg tablet 1 tablet, Daily    EPINEPHrine 0.3 mg/0.3 mL injection syringe 1 Syringe, Once as needed     ezetimibe (ZETIA) 10 mg, oral, Daily    famotidine (PEPCID) 20 mg, Daily    folic acid (Folvite) 800 mcg tablet 1 tablet, Daily    furosemide (Lasix) 20 mg tablet TAKE 1 TABLET BY MOUTH THREE TIMES A WEEK    lisinopril 10 mg, oral, Daily, for blood pressure    methocarbamol (ROBAXIN) 750 mg, oral, 3 times daily PRN    methylPREDNISolone (Medrol Dospak) 4 mg tablets Take as directed on package.    naloxone (NARCAN) 4 mg, nasal, As needed, May repeat every 2-3 minutes if needed, alternating nostrils, until medical assistance becomes available.    oxyCODONE (ROXICODONE) 10 mg, oral, 3 times daily PRN    [START ON 1/27/2025] oxyCODONE (ROXICODONE) 10 mg, oral, 3 times daily PRN    oxyCODONE-acetaminophen (Percocet)  mg tablet 1 tablet, oral, 3 times daily PRN    oxyCODONE-acetaminophen (Percocet)  mg tablet 1 tablet, oral, 3 times daily PRN    oxyCODONE-acetaminophen (Percocet)  mg tablet 1 tablet, oral, 3 times daily PRN    potassium chloride CR (Klor-Con) 10 mEq ER tablet Take 2 tablets by mouth 3 x weekly    pravastatin (Pravachol) 10 mg tablet TAKE 1 TABLET BY MOUTH THREE TIMES A WEEK    rOPINIRole (REQUIP) 1 mg, oral, 3 times daily    zinc gluconate 50 mg tablet 50 mg of elemental zinc, 3 times weekly       Objective   /75   Pulse 64   Temp 36.5 °C (97.7 °F)   Wt 113 kg (248 lb 4 oz)   SpO2 95%   BMI 35.62 kg/m²           Physical Exam  Vitals and nursing note reviewed.   Constitutional:       General: He is not in acute distress.     Appearance: Normal appearance. He is not diaphoretic.      Comments: No assistive device presently being used.   HENT:      Head: Normocephalic and atraumatic.      Right Ear: Tympanic membrane, ear canal and external ear normal.      Left Ear: Tympanic membrane, ear canal and external ear normal.      Nose: Congestion and rhinorrhea present.      Mouth/Throat:      Mouth: Mucous membranes are moist.      Pharynx: Oropharynx is clear. No oropharyngeal  exudate or posterior oropharyngeal erythema.   Eyes:      General: No scleral icterus.     Extraocular Movements: Extraocular movements intact.      Conjunctiva/sclera: Conjunctivae normal.   Cardiovascular:      Rate and Rhythm: Normal rate and regular rhythm.      Heart sounds: Normal heart sounds.   Pulmonary:      Effort: Pulmonary effort is normal. No respiratory distress.      Breath sounds: Wheezing (mild diffuse) present. No rhonchi or rales.   Musculoskeletal:      Cervical back: Normal range of motion and neck supple. No tenderness.      Right lower leg: No tenderness (negative Stanislaw sign). No edema.      Left lower leg: No tenderness (negative Stanislaw sign). No edema.   Lymphadenopathy:      Cervical: No cervical adenopathy.   Skin:     General: Skin is warm and dry.      Coloration: Skin is not jaundiced.   Neurological:      General: No focal deficit present.      Mental Status: He is alert and oriented to person, place, and time. Mental status is at baseline.   Psychiatric:         Mood and Affect: Mood normal.         Behavior: Behavior normal.         Thought Content: Thought content normal.         Assessment & Plan  Bronchitis  Z-jose antonio, Medrol, supportive care. Avoid decongestants due to cardiac health.   Orders:    azithromycin (Zithromax) 250 mg tablet; Take 2 tablets (500 mg) by mouth once daily for 1 day, THEN 1 tablet (250 mg) once daily for 4 days. Take 2 tabs (500 mg) by mouth today, than 1 daily for 4 days..    methylPREDNISolone (Medrol Dospak) 4 mg tablets; Take as directed on package.    benzonatate (Tessalon) 100 mg capsule; Take 1-2 capsules (100-200 mg) by mouth 3 times a day as needed for cough for up to 10 days. Do not crush or chew.

## 2025-01-09 NOTE — ASSESSMENT & PLAN NOTE
Z-jose antonio, Medrol, supportive care. Avoid decongestants due to cardiac health.   Orders:    azithromycin (Zithromax) 250 mg tablet; Take 2 tablets (500 mg) by mouth once daily for 1 day, THEN 1 tablet (250 mg) once daily for 4 days. Take 2 tabs (500 mg) by mouth today, than 1 daily for 4 days..    methylPREDNISolone (Medrol Dospak) 4 mg tablets; Take as directed on package.    benzonatate (Tessalon) 100 mg capsule; Take 1-2 capsules (100-200 mg) by mouth 3 times a day as needed for cough for up to 10 days. Do not crush or chew.

## 2025-01-16 ENCOUNTER — APPOINTMENT (OUTPATIENT)
Dept: PAIN MEDICINE | Facility: CLINIC | Age: 75
End: 2025-01-16
Payer: MEDICARE

## 2025-01-18 ENCOUNTER — OFFICE VISIT (OUTPATIENT)
Dept: URGENT CARE | Age: 75
End: 2025-01-18
Payer: MEDICARE

## 2025-01-18 VITALS
HEART RATE: 74 BPM | TEMPERATURE: 97.8 F | SYSTOLIC BLOOD PRESSURE: 144 MMHG | WEIGHT: 248 LBS | DIASTOLIC BLOOD PRESSURE: 75 MMHG | OXYGEN SATURATION: 95 % | RESPIRATION RATE: 18 BRPM | BODY MASS INDEX: 35.58 KG/M2

## 2025-01-18 DIAGNOSIS — J39.2 IRRITATED THROAT: Primary | ICD-10-CM

## 2025-01-18 DIAGNOSIS — J02.9 PHARYNGITIS, UNSPECIFIED ETIOLOGY: ICD-10-CM

## 2025-01-18 LAB
POC RAPID INFLUENZA A: NEGATIVE
POC RAPID INFLUENZA B: NEGATIVE
POC RAPID STREP: NEGATIVE
POC SARS-COV-2 AG BINAX: NORMAL

## 2025-01-18 PROCEDURE — 87651 STREP A DNA AMP PROBE: CPT

## 2025-01-18 RX ORDER — AMOXICILLIN 500 MG/1
500 CAPSULE ORAL 2 TIMES DAILY
Qty: 20 CAPSULE | Refills: 0 | Status: SHIPPED | OUTPATIENT
Start: 2025-01-18 | End: 2025-01-28

## 2025-01-18 ASSESSMENT — ENCOUNTER SYMPTOMS: SORE THROAT: 1

## 2025-01-18 NOTE — PROGRESS NOTES
Subjective   Patient ID: Phuc Cheek is a 74 y.o. male. They present today with a chief complaint of throat redness (Had dry phlegm stuck in his throat yesterday; removed the phlegm but now has red dots on throat x several hours).    History of Present Illness  HPI    Past Medical History  Allergies as of 01/18/2025 - Reviewed 01/09/2025   Allergen Reaction Noted    Adhesive tape-silicones Unknown 04/11/2023    Bee venom protein (honey bee) Hives and Swelling 04/11/2023    Niacin Other 04/21/2011    Simvastatin Myalgia 04/21/2011    Tetracyclines Unknown 04/11/2023       (Not in a hospital admission)       Past Medical History:   Diagnosis Date    Acute bronchitis 02/16/2024    Arthritis Unknown    Basal cell carcinoma of skin, unspecified     Skin cancer, basal cell    Cellulitis of lower extremity 02/16/2024    CHF (congestive heart failure) 2014    Disease due to severe acute respiratory syndrome coronavirus 2 (SARS-CoV-2) 04/11/2023    Diverticulosis of intestine, part unspecified, without perforation or abscess without bleeding     Diverticulosis    Eczema 04/11/2023    Heart disease 1999    Hematuria 02/16/2024    HLD (hyperlipidemia) 04/11/2023    Hypertension 1999    Myocardial infarction (Multi) 11/5/2014    Noninfective gastroenteritis and colitis, unspecified     Colitis    Other abnormal glucose     Hemoglobin A1c less than 7.0%    Personal history of (healed) traumatic fracture     History of fracture of upper extremity    Personal history of other diseases of the circulatory system     History of arteriosclerotic cardiovascular disease    Personal history of other diseases of the digestive system     History of anal fissures    Personal history of other diseases of the digestive system     History of esophageal reflux    Personal history of other diseases of the digestive system     History of hemorrhoids    Personal history of other diseases of the musculoskeletal system and connective tissue      History of osteoarthritis    Personal history of other diseases of the nervous system and sense organs     History of sleep apnea    Personal history of other diseases of the respiratory system     History of sinusitis    Personal history of other medical treatment     History of stress test    Personal history of other specified conditions     History of headache    Personal history of urinary calculi     History of renal calculi    Restless legs syndrome     RLS (restless legs syndrome)    Right lower quadrant abdominal pain 02/24/2023    Scalp hematoma 02/16/2024    Sore throat 04/11/2023    Thrush, oral 04/11/2023    Umbilical hernia without obstruction or gangrene     Umbilical hernia    Unspecified atherosclerosis     Blocked artery    Varicella unknown       Past Surgical History:   Procedure Laterality Date    APPENDECTOMY  02/17/2017    Appendectomy    CARDIAC CATHETERIZATION  1999,2000    CERVICAL FUSION  07/22/2016    Cervical Vertebral Fusion    COLONOSCOPY  02/17/2017    Colonoscopy    CORONARY ARTERY BYPASS GRAFT  07/22/2016    CABG    CORONARY STENT PLACEMENT  1999,2000,2014    ESOPHAGOGASTRODUODENOSCOPY  02/17/2017    Diagnostic Esophagogastroduodenoscopy    HAND TENDON SURGERY  07/22/2016    Hand Incision Tendon Sheath Of A Finger    HEMORRHOID SURGERY  02/17/2017    Hemorrhoidectomy    HERNIA REPAIR  02/17/2017    Inguinal Hernia Repair    JOINT REPLACEMENT  4/29/2014 &3/14/2022 knees    KNEE SURGERY  07/22/2016    Knee Surgery    MR HEAD ANGIO WO IV CONTRAST  05/28/2016    MR HEAD ANGIO WO IV CONTRAST 5/28/2016 [facility]    MR HEAD ANGIO WO IV CONTRAST  05/03/2018    MR HEAD ANGIO WO IV CONTRAST 5/3/2018 [facility]    MR HEAD ANGIO WO IV CONTRAST  05/03/2018    MR HEAD ANGIO WO IV CONTRAST 5/3/2018 [facility]    MR NECK ANGIO WO IV CONTRAST  05/28/2016    MR NECK ANGIO WO IV CONTRAST 5/28/2016 [facility]GAC[facility]    MR NECK ANGIO WO IV CONTRAST  05/03/2018    MR  NECK ANGIO WO IV CONTRAST 5/3/2018 [facility]    OTHER SURGICAL HISTORY  07/22/2016    Biopsy Skin    OTHER SURGICAL HISTORY  10/08/2021    Heart surgery    OTHER SURGICAL HISTORY  02/17/2017    Excision Of Leg Soft Tissue Tumor    OTHER SURGICAL HISTORY  02/17/2017    Nerve Block Cervical Plexus    OTHER SURGICAL HISTORY  02/17/2017    Nerve Block Lumbar Plexus    OTHER SURGICAL HISTORY  02/17/2017    Acupuncture One Or More Hedrick    OTHER SURGICAL HISTORY  02/17/2017    Angioplasty    OTHER SURGICAL HISTORY  02/17/2017    Arterial Catheterization    OTHER SURGICAL HISTORY  02/17/2017    Cystoscopy With Biopsy    SINUS SURGERY  10/29/2014    SPINE SURGERY  1993 & 2018 neck fusion    SPLENECTOMY, TOTAL  02/17/2017    Splenectomy    TONSILLECTOMY  07/22/2016    Tonsillectomy        reports that he has never smoked. He has never used smokeless tobacco. He reports current alcohol use of about 1.0 - 2.0 standard drink of alcohol per week. He reports that he does not use drugs.    Review of Systems  Review of Systems   HENT:  Positive for sore throat.    All other systems reviewed and are negative.                                 Objective    There were no vitals filed for this visit.  No LMP for male patient.    Physical Exam  Vitals reviewed.   Constitutional:       Appearance: Normal appearance.   HENT:      Head: Normocephalic and atraumatic.      Right Ear: Tympanic membrane, ear canal and external ear normal.      Left Ear: Tympanic membrane, ear canal and external ear normal.      Mouth/Throat:      Mouth: Mucous membranes are moist.      Pharynx: Oropharynx is clear. Posterior oropharyngeal erythema present.   Eyes:      Extraocular Movements: Extraocular movements intact.      Conjunctiva/sclera: Conjunctivae normal.      Pupils: Pupils are equal, round, and reactive to light.   Cardiovascular:      Rate and Rhythm: Normal rate and regular rhythm.      Pulses: Normal pulses.      Heart sounds: Normal  heart sounds.   Pulmonary:      Effort: Pulmonary effort is normal.      Breath sounds: Normal breath sounds.   Musculoskeletal:         General: Normal range of motion.      Cervical back: Normal range of motion.   Skin:     General: Skin is warm.      Capillary Refill: Capillary refill takes less than 2 seconds.   Neurological:      General: No focal deficit present.      Mental Status: He is alert and oriented to person, place, and time.   Psychiatric:         Mood and Affect: Mood normal.         Behavior: Behavior normal.         Procedures    Point of Care Test & Imaging Results from this visit  No results found for this visit on 01/18/25.   No results found.    Diagnostic study results (if any) were reviewed by JIM Brady.    Assessment/Plan   Allergies, medications, history, and pertinent labs/EKGs/Imaging reviewed by JIM Brady.     Medical Decision Making  Patient is in NAD, VSS, HRR, lungs clear.  Reports was treated for bronchitis beginning of January with Zpack, medrol dose pack, reports sore throat started 2 days ago, noticed red spots on roof of mouth, denies difficulty swallowing, on exam throat erythema, patent airway, there is a few red spots on roof of mouth, do not see any evidence of thrush, no white patches.  Rapid strep is negative  Strep PCR sent to pharmacy.  Will send amoxicillin empirically for symptoms, patient would like , patient is to follow up with primary care doctor, may need labs done, patient is to go to emergency department with any worsening symptoms, red flags discussed, patient agrees with plan of care, patient left in stable condition.      Orders and Diagnoses  There are no diagnoses linked to this encounter.    Medical Admin Record      Patient disposition: Home    Electronically signed by JIM Brady  1:45 PM

## 2025-01-18 NOTE — PATIENT INSTRUCTIONS
Start Amoxicillin as directed, will call you tomorrow  with PCR results of strep, make an appointment with primary care doctor on Monday, go to emergency department with worsening symptoms, warm salt water gargles daily.

## 2025-01-19 LAB — S PYO DNA THROAT QL NAA+PROBE: NOT DETECTED

## 2025-01-24 DIAGNOSIS — M47.816 LUMBAR FACET ARTHROPATHY: ICD-10-CM

## 2025-01-24 DIAGNOSIS — M47.812 SPONDYLOSIS OF CERVICAL REGION WITHOUT MYELOPATHY OR RADICULOPATHY: ICD-10-CM

## 2025-01-24 RX ORDER — OXYCODONE AND ACETAMINOPHEN 10; 325 MG/1; MG/1
1 TABLET ORAL 3 TIMES DAILY PRN
Qty: 84 TABLET | Refills: 0 | Status: SHIPPED | OUTPATIENT
Start: 2025-01-27

## 2025-01-24 NOTE — TELEPHONE ENCOUNTER
Pt wants to go back to percocet 10/325 tid. Dr LOBATO switched him to oxycodone 10mg tid to be taken with 2- 500mg tylenol. He does not feel it is as effective

## 2025-01-30 ENCOUNTER — HOSPITAL ENCOUNTER (OUTPATIENT)
Dept: PAIN MEDICINE | Facility: CLINIC | Age: 75
Discharge: HOME | End: 2025-01-30
Payer: MEDICARE

## 2025-01-30 VITALS
HEART RATE: 64 BPM | RESPIRATION RATE: 16 BRPM | SYSTOLIC BLOOD PRESSURE: 149 MMHG | DIASTOLIC BLOOD PRESSURE: 91 MMHG | OXYGEN SATURATION: 95 %

## 2025-01-30 DIAGNOSIS — M48.062 LUMBAR STENOSIS WITH NEUROGENIC CLAUDICATION: ICD-10-CM

## 2025-01-30 PROCEDURE — 2550000001 HC RX 255 CONTRASTS: Performed by: ANESTHESIOLOGY

## 2025-01-30 PROCEDURE — 62323 NJX INTERLAMINAR LMBR/SAC: CPT | Performed by: ANESTHESIOLOGY

## 2025-01-30 PROCEDURE — 2500000005 HC RX 250 GENERAL PHARMACY W/O HCPCS: Performed by: ANESTHESIOLOGY

## 2025-01-30 PROCEDURE — 2500000004 HC RX 250 GENERAL PHARMACY W/ HCPCS (ALT 636 FOR OP/ED): Performed by: ANESTHESIOLOGY

## 2025-01-30 RX ORDER — METHYLPREDNISOLONE ACETATE 40 MG/ML
INJECTION, SUSPENSION INTRA-ARTICULAR; INTRALESIONAL; INTRAMUSCULAR; SOFT TISSUE AS NEEDED
Status: COMPLETED | OUTPATIENT
Start: 2025-01-30 | End: 2025-01-30

## 2025-01-30 RX ORDER — INDOMETHACIN 25 MG/1
CAPSULE ORAL AS NEEDED
Status: COMPLETED | OUTPATIENT
Start: 2025-01-30 | End: 2025-01-30

## 2025-01-30 RX ORDER — SODIUM CHLORIDE 9 MG/ML
INJECTION, SOLUTION INTRAMUSCULAR; INTRAVENOUS; SUBCUTANEOUS AS NEEDED
Status: COMPLETED | OUTPATIENT
Start: 2025-01-30 | End: 2025-01-30

## 2025-01-30 RX ORDER — LIDOCAINE HYDROCHLORIDE 5 MG/ML
INJECTION, SOLUTION INFILTRATION; INTRAVENOUS AS NEEDED
Status: COMPLETED | OUTPATIENT
Start: 2025-01-30 | End: 2025-01-30

## 2025-01-30 RX ADMIN — METHYLPREDNISOLONE ACETATE 40 MG: 40 INJECTION, SUSPENSION INTRA-ARTICULAR; INTRALESIONAL; INTRAMUSCULAR; INTRASYNOVIAL; SOFT TISSUE at 11:33

## 2025-01-30 RX ADMIN — LIDOCAINE HYDROCHLORIDE 13 ML: 5 INJECTION, SOLUTION INFILTRATION at 11:33

## 2025-01-30 RX ADMIN — SODIUM BICARBONATE 1 MEQ: 84 INJECTION, SOLUTION INTRAVENOUS at 11:33

## 2025-01-30 RX ADMIN — IOHEXOL 5 ML: 240 INJECTION, SOLUTION INTRATHECAL; INTRAVASCULAR; INTRAVENOUS; ORAL at 11:33

## 2025-01-30 RX ADMIN — SODIUM CHLORIDE 5 ML: 9 INJECTION, SOLUTION INTRAMUSCULAR; INTRAVENOUS; SUBCUTANEOUS at 11:33

## 2025-01-30 ASSESSMENT — ENCOUNTER SYMPTOMS
DEPRESSION: 0
LOSS OF SENSATION IN FEET: 0
OCCASIONAL FEELINGS OF UNSTEADINESS: 0

## 2025-01-30 ASSESSMENT — PAIN - FUNCTIONAL ASSESSMENT: PAIN_FUNCTIONAL_ASSESSMENT: 0-10

## 2025-01-30 ASSESSMENT — PAIN DESCRIPTION - DESCRIPTORS: DESCRIPTORS: ACHING

## 2025-01-30 ASSESSMENT — PAIN SCALES - GENERAL: PAINLEVEL_OUTOF10: 1

## 2025-01-30 NOTE — H&P
Left greater than right history Of Present Illness  Phuc Cheek is a 74 y.o. male presenting with low back pain left greater than right.  Patient is scheduled for L4-L5 lumbar epidural steroid injection.  Risk and benefits discussed is agreeable with the plan     Past Medical History  He has a past medical history of Acute bronchitis (02/16/2024), Arthritis (Unknown), Basal cell carcinoma of skin, unspecified, Cellulitis of lower extremity (02/16/2024), CHF (congestive heart failure) (2014), Disease due to severe acute respiratory syndrome coronavirus 2 (SARS-CoV-2) (04/11/2023), Diverticulosis of intestine, part unspecified, without perforation or abscess without bleeding, Eczema (04/11/2023), Heart disease (1999), Hematuria (02/16/2024), HLD (hyperlipidemia) (04/11/2023), Hypertension (1999), Myocardial infarction (Multi) (11/5/2014), Noninfective gastroenteritis and colitis, unspecified, Other abnormal glucose, Personal history of (healed) traumatic fracture, Personal history of other diseases of the circulatory system, Personal history of other diseases of the digestive system, Personal history of other diseases of the digestive system, Personal history of other diseases of the digestive system, Personal history of other diseases of the musculoskeletal system and connective tissue, Personal history of other diseases of the nervous system and sense organs, Personal history of other diseases of the respiratory system, Personal history of other medical treatment, Personal history of other specified conditions, Personal history of urinary calculi, Restless legs syndrome, Right lower quadrant abdominal pain (02/24/2023), Scalp hematoma (02/16/2024), Sore throat (04/11/2023), Thrush, oral (04/11/2023), Umbilical hernia without obstruction or gangrene, Unspecified atherosclerosis, and Varicella (unknown).    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/17/2017); Colonoscopy (02/17/2017); Other surgical history (02/17/2017); Other surgical history (02/17/2017); Other surgical history (02/17/2017); Hemorrhoid surgery (02/17/2017); Appendectomy (02/17/2017); Other surgical history (02/17/2017); Other surgical history (02/17/2017); Other surgical history (02/17/2017); Hernia repair (02/17/2017); MR angio head wo IV contrast (05/28/2016); MR angio neck wo IV contrast (05/28/2016); MR angio head wo IV contrast (05/03/2018); MR angio neck wo IV contrast (05/03/2018); MR angio head wo IV contrast (05/03/2018); Coronary stent placement (1999,2000,2014); Joint replacement (4/29/2014 &3/14/2022 knees); Sinus surgery (10/29/2014); Spine surgery (1993 & 2018 neck fusion); and Cardiac catheterization (1999,2000).     Social History  He reports that he has never smoked. He has never used smokeless tobacco. He reports current alcohol use of about 1.0 - 2.0 standard drink of alcohol per week. He reports that he does not use drugs.    Family History  Family History   Problem Relation Name Age of Onset    Cancer Mother MOM         breast    Heart disease Mother MOM     Breast cancer Mother MOM     Arthritis Mother MOM     Hyperlipidemia Mother MOM     Hypertension Mother MOM     Heart attack Mother MOM     Arthritis Father DAD     Other (bladder cancer) Father DAD     Diabetes Father DAD     Heart disease Father DAD     Other (cabg) Father DAD     Hypertension Father DAD     Heart attack Father DAD     Skin cancer Father DAD     Diabetes Sister Sister     Arthritis Sister Sister     Goiter Sister Sister     Hyperlipidemia Sister Sister     Heart disease Sister Sister only     Accidental death  Brother Brother     Arthritis Brother Brother     Hyperlipidemia Brother Brother     Heart disease Brother Brother only         Allergies  Adhesive tape-silicones, Bee venom protein (honey bee), Niacin, Simvastatin, and Tetracyclines    Review of systems:   13-point review of systems done and negative except as noted in HPI      Physical Exam   Vital signs: Reviewed  MSK:   No asymmetry or masses noted of the musculature.   Examination of the muscles/joints/bones show normal range of motion.  The patient is mildly tender to palpation in the cervical and lumbar paraspinal musculature as well as the medial lateral joint lines of both knees.    Neurologic:  Motor strength:   5/5 muscle strength of the upper extremities bilateral and equal.  5/5 muscle strength of the lower extremities bilaterally and equal.     Sensation:   Sensation intact to pin prick in the bilateral upper extremities.  Sensation intact to pin prick in the bilateral lower extremities.     Provocative tests: Negative Doris sign bilaterally, 2+ patellar reflexes bilaterally.    Psychiatric: Mood and affect are normal.     Last Recorded Vitals  /90   Pulse 82   Resp 20     Relevant Results            Last OARRS Review: No data recorded    I have personally reviewed the OARRS report for Phuc Cheek I have considered the risks of abuse, dependence, addiction and diversion       Assessment/Plan   Assessment & Plan  Lumbar stenosis with neurogenic claudication      As discussed above       Osvaldo Kuhn MD

## 2025-01-30 NOTE — DISCHARGE INSTRUCTIONS
[facility]  [facility] [address]  [address] [id number] [phone]    POST PROCEDURE INSTRUCTIONS    Activity  Medication used during your procedure may cause some temporary weakness or numbness in your arms or legs, depending on the type of injection you received. It is recommended that you do not drive or operate machinery the day of your injection.  You do not need to stay in bed when you get home. You should be able to resume your normal activities, including work. However, any pre-existing physical restriction you had prior to the procedure may still remain.   Have a responsible adult with you if you received sedation for the procedure. Do not drive or operate machinery for 12 hours.    Pain  Immediate pain relief from the local anesthetic will wear off after several hours.  Prolonged relief from the steroid may take 3-14 days to occur.  Some patients may experience a “flare up” of their normal pain for a few days after the procedure. You may apply ice packs to the sore area for 15minutes on/ 2 hours off and take your prescribed or over the counter analgesics as needed.  Common side effects from the steroid include facial flushing, headaches,fluid retention,trouble sleeping,and cold like symptoms for 24-48 hours post procedure.  Patients receiving diagnostic injections (no steroid): pain relief is intended to be temporary. Pay attention to the percentage of pain relief that occurs compared to before your injection so you can report this to your doctor. Pain scores before and after the procedure need to be documented.    Medications  Resume your normal medications unless otherwise instructed  If you held your blood thinning medication for the procedure,you will be instructed on when to restart.    Injection site care  Keep the injection site clean and dry. You may shower and remove the Band Aid after you arrive home.  If you notice excessive  bleeding (slow general oozing that completely soaks the dressing or fresh bright red bleeding),apply pressure and call our office immediately.  Observe for signs of infection: increasing pain at injection site, redness, swelling, drainage with a foul smell, any associated fever or chills                        If you notice any of these, call our office immediately.    Diabetic patients   You may notice an elevation in blood sugar if steroid is used. Notify your primary care doctor if blood sugar levels do not return to normal.    Follow up  Make a virtual injection follow up appointment  with Dr. Kuhn in 3-4 weeks      Call our office at [phone] to speak to the clinical staff with any concerns or problems.  Go to the nearest emergency room if you are not able to reach us and your problem is urgent.  Call [phone] if you develop serious symptoms such as: chest pain or difficulty breathing.

## 2025-02-05 ENCOUNTER — OFFICE VISIT (OUTPATIENT)
Dept: PRIMARY CARE | Facility: CLINIC | Age: 75
End: 2025-02-05
Payer: MEDICARE

## 2025-02-05 VITALS
OXYGEN SATURATION: 95 % | TEMPERATURE: 97.7 F | DIASTOLIC BLOOD PRESSURE: 73 MMHG | SYSTOLIC BLOOD PRESSURE: 104 MMHG | HEART RATE: 65 BPM | WEIGHT: 243.38 LBS | BODY MASS INDEX: 34.92 KG/M2

## 2025-02-05 DIAGNOSIS — J10.1 INFLUENZA A: Primary | ICD-10-CM

## 2025-02-05 LAB
POC RAPID INFLUENZA A: POSITIVE
POC RAPID INFLUENZA B: NEGATIVE

## 2025-02-05 PROCEDURE — 3078F DIAST BP <80 MM HG: CPT | Performed by: FAMILY MEDICINE

## 2025-02-05 PROCEDURE — 3074F SYST BP LT 130 MM HG: CPT | Performed by: FAMILY MEDICINE

## 2025-02-05 PROCEDURE — 87804 INFLUENZA ASSAY W/OPTIC: CPT | Performed by: FAMILY MEDICINE

## 2025-02-05 PROCEDURE — 1159F MED LIST DOCD IN RCRD: CPT | Performed by: FAMILY MEDICINE

## 2025-02-05 PROCEDURE — 99214 OFFICE O/P EST MOD 30 MIN: CPT | Performed by: FAMILY MEDICINE

## 2025-02-05 PROCEDURE — 1124F ACP DISCUSS-NO DSCNMKR DOCD: CPT | Performed by: FAMILY MEDICINE

## 2025-02-05 RX ORDER — BENZONATATE 100 MG/1
100-200 CAPSULE ORAL 3 TIMES DAILY PRN
Qty: 60 CAPSULE | Refills: 0 | Status: SHIPPED | OUTPATIENT
Start: 2025-02-05 | End: 2025-02-15

## 2025-02-05 RX ORDER — OSELTAMIVIR PHOSPHATE 75 MG/1
75 CAPSULE ORAL 2 TIMES DAILY
Qty: 10 CAPSULE | Refills: 0 | Status: SHIPPED | OUTPATIENT
Start: 2025-02-05 | End: 2025-02-10

## 2025-02-05 ASSESSMENT — ENCOUNTER SYMPTOMS
HEADACHES: 1
FEVER: 0
COUGH: 1

## 2025-02-05 NOTE — PROGRESS NOTES
Subjective   Patient ID: Phuc Cheek is a 74 y.o. male who presents for Cough, Headache, and URI (Pt presents c/o congestion, nasal/chest, no fever, negative Covid this AM.).  HPI Historian(s): Self    c/o head congestion, cough, up all night coughing,   Onset of symptoms was 1 days ago.  Symptoms have been gradually worsening since that time.  Severity is moderate.  Tried Afrin, which helped.  Improved or relieved by Afrin. Cough improved by cold air.  Exacerbated by n/a  Also c/o runny nose, headache.  Denies fever, chills, sweats, NVD, chest/side/back pain, pain with deep inhalation, leg bruising/redness/pain/swelling, SOB, difficulty breathing.  Did get flu shot this season.    Review of Systems   Constitutional:  Negative for fever.   HENT:  Positive for congestion.    Respiratory:  Positive for cough.    Neurological:  Positive for headaches.   All other systems reviewed and are negative.    No LMP for male patient.    Patient Care Team:  Salvador Rachel DO as PCP - General (Family Medicine)  Salvador Rachel DO as PCP - [facility] [place] [place] [facility] Attributed Provider  Cecilio Osman MD as Referring Physician (Cardiology)  Osvaldo Kuhn MD as Anesthesiologist (Pain Medicine)  Marnie Chavez MD as Referring Physician (Endocrinology)  Ya Maldonado MD as Referring Physician (Internal Medicine)  Vale Johnson (Neurology)  Jayce Valdez DO as Surgeon (Orthopaedic Surgery)  Ruy Steiner MD as Referring Physician (Internal Medicine)    Current Outpatient Medications   Medication Instructions    acetaminophen (TYLENOL) 1,000 mg, Every 6 hours PRN    atenolol (TENORMIN) 25 mg, oral, Daily    benzonatate (TESSALON) 100-200 mg, oral, 3 times daily PRN, Do not crush or chew.    cetirizine (ZYRTEC) 10 mg, Daily    cholecalciferol (Vitamin D-3) 50 MCG (2000 UT) tablet 1 each orally once a day    clopidogrel (PLAVIX) 75 mg, oral, Daily    cyanocobalamin (Vitamin B-12) 500 mcg tablet 1 tablet, Daily    EPINEPHrine  0.3 mg/0.3 mL injection syringe 1 Syringe, Once as needed    ezetimibe (ZETIA) 10 mg, oral, Daily    famotidine (PEPCID) 20 mg, Daily    folic acid (Folvite) 800 mcg tablet 1 tablet, Daily    furosemide (Lasix) 20 mg tablet TAKE 1 TABLET BY MOUTH THREE TIMES A WEEK    lisinopril 10 mg, oral, Daily, for blood pressure    methocarbamol (ROBAXIN) 750 mg, oral, 3 times daily PRN    oseltamivir (TAMIFLU) 75 mg, oral, 2 times daily    oxyCODONE (ROXICODONE) 10 mg, oral, 3 times daily PRN    oxyCODONE-acetaminophen (Percocet)  mg tablet 1 tablet, oral, 3 times daily PRN    oxyCODONE-acetaminophen (Percocet)  mg tablet 1 tablet, oral, 3 times daily PRN    oxyCODONE-acetaminophen (Percocet)  mg tablet 1 tablet, oral, 3 times daily PRN    oxyCODONE-acetaminophen (Percocet)  mg tablet 1 tablet, oral, 3 times daily PRN    potassium chloride CR (Klor-Con) 10 mEq ER tablet Take 2 tablets by mouth 3 x weekly    pravastatin (Pravachol) 10 mg tablet TAKE 1 TABLET BY MOUTH THREE TIMES A WEEK    rOPINIRole (REQUIP) 1 mg, oral, 3 times daily    zinc gluconate 50 mg tablet 50 mg of elemental zinc, 3 times weekly       Objective   /73   Pulse 65   Temp 36.5 °C (97.7 °F)   Wt 110 kg (243 lb 6 oz)   SpO2 95%   BMI 34.92 kg/m²           Physical Exam  Vitals and nursing note reviewed.   Constitutional:       General: He is not in acute distress.     Appearance: Normal appearance. He is not diaphoretic.      Comments: No assistive device presently being used.   HENT:      Head: Normocephalic and atraumatic.      Right Ear: Tympanic membrane, ear canal and external ear normal.      Left Ear: Tympanic membrane, ear canal and external ear normal.      Nose: Nose normal.      Mouth/Throat:      Mouth: Mucous membranes are moist.      Pharynx: Oropharynx is clear. No posterior oropharyngeal erythema.   Eyes:      General: No scleral icterus.     Extraocular Movements: Extraocular movements intact.       Conjunctiva/sclera: Conjunctivae normal.   Cardiovascular:      Rate and Rhythm: Normal rate and regular rhythm.      Heart sounds: Normal heart sounds.   Pulmonary:      Effort: Pulmonary effort is normal. No respiratory distress.      Breath sounds: Normal breath sounds. No wheezing, rhonchi or rales.   Musculoskeletal:      Cervical back: Normal range of motion and neck supple. No tenderness.   Lymphadenopathy:      Cervical: No cervical adenopathy.   Skin:     General: Skin is warm and dry.      Coloration: Skin is not jaundiced.   Neurological:      General: No focal deficit present.      Mental Status: He is alert and oriented to person, place, and time. Mental status is at baseline.   Psychiatric:         Mood and Affect: Mood normal.         Behavior: Behavior normal.         Thought Content: Thought content normal.         Assessment & Plan  Influenza A  Day 1, supportive care, r/o influenza. Flu A positive.  Orders:    benzonatate (Tessalon) 100 mg capsule; Take 1-2 capsules (100-200 mg) by mouth 3 times a day as needed for cough for up to 10 days. Do not crush or chew.    POCT Influenza A/B manually resulted    oseltamivir (Tamiflu) 75 mg capsule; Take 1 capsule (75 mg) by mouth 2 times a day for 5 days.

## 2025-02-05 NOTE — PATIENT INSTRUCTIONS
Anticipate usual course of viral upper respiratory illness. Worst of symptoms typically lasting for about 7 days, often peaking around day 5. Improvement should typically begin between days 7-10 of illness. Resolution of symptoms typically within 2-3 weeks. Some things that might indicate something else is going on, or has developed: Fever starting after day 5 of illness, worsening of condition after day 7 of illness, not beginning to have improvement by day 10 of illness, fever (100.4 or higher) going away for 48 hours then returning, sharp stabbing ear pain, pain/redness/swelling localized over a sinus cavity, or severe or rapidly worsening symptoms.    If appropriate, Afrin/oxymetazoline for 3 days can be very helpful, for teens and adults (children 6-12 only with adult supervision). Nasal steroids (e.g., Flonase, Nasacort, etc.) may be a safer option, though not as effective. Nasal saline can also help thin the mucus to make it drain out more easily. A nasal antihistamine spray (e.g., Azelastine) can be very helpful for allergies, as can nasal steroid sprays.    For a sore throat, you may try salt water gargles, straight honey. Adults may try Cepacol lozenges, Chloraseptic throat spray.    For a cough, you may try Delsym/dextromethorphan. Adults may try Coricidin HBP (chlorpheniramine-dextromethorphan), Benzonatate/Tessalon Perles, cough drops.    Please return or seek medical attention if symptoms persist, change, worsen, or return. For emergencies, call 9-1-1 or go to the nearest Emergency Room. Please schedule additional problem-focused appointment(s) to address additional problem(s).    Avoid taking Biotin (a vitamin, shows up particularly in hair/nail supplements) for a week prior to any blood tests, as it can interfere with certain results. Fasting for labs means 12 hours, nothing to eat or drink, except water and medications, unless directed otherwise.    For assistance with scheduling referrals or  consultations, please call [phone]. For laboratory, radiology, and other tests, please call [phone] ([phone] for pediatrics). Please review prescription inserts and published information for possible adverse effects of all medications. Return after testing or consultation to review results and recommendations, if symptoms persist, change, worsen, or return, or if you have any question or concern. If you do not get results within 7-10 days, or you have any question or concern, please send a message, call the office ([phone]), or return to the office for a follow-up appointment. For non-emergencies, you may call the office. For emergencies, call 9-1-1 or go to the nearest Emergency Department. Please schedule additional appointment(s) to address concern(s) not addressed today. An annual Wellness visit is strongly recommended. A Wellness visit should be dedicated to addressing routine health maintenance matters (e.g., cancer screenings, cardiovascular screening, etc.). Problem-focused visits, typically prompted by symptoms or specific concerns, are usually conducted separately, particularly if multiple or complex problems need to be addressed.    In general, results are not released or discussed over the telephone, but at an appointment or via  [employer]. Results of tests done through [facility] are released via  [employer] (see below).  [email or web address]   [employer] support line: [phone]

## 2025-02-05 NOTE — ASSESSMENT & PLAN NOTE
Day 1, supportive care, r/o influenza. Flu A positive.  Orders:    benzonatate (Tessalon) 100 mg capsule; Take 1-2 capsules (100-200 mg) by mouth 3 times a day as needed for cough for up to 10 days. Do not crush or chew.    POCT Influenza A/B manually resulted    oseltamivir (Tamiflu) 75 mg capsule; Take 1 capsule (75 mg) by mouth 2 times a day for 5 days.

## 2025-02-06 ENCOUNTER — APPOINTMENT (OUTPATIENT)
Dept: PRIMARY CARE | Facility: CLINIC | Age: 75
End: 2025-02-06
Payer: MEDICARE

## 2025-02-17 ENCOUNTER — OFFICE VISIT (OUTPATIENT)
Dept: PAIN MEDICINE | Facility: CLINIC | Age: 75
End: 2025-02-17
Payer: MEDICARE

## 2025-02-17 VITALS
DIASTOLIC BLOOD PRESSURE: 82 MMHG | OXYGEN SATURATION: 94 % | HEART RATE: 65 BPM | SYSTOLIC BLOOD PRESSURE: 142 MMHG | RESPIRATION RATE: 18 BRPM

## 2025-02-17 DIAGNOSIS — M54.16 LUMBAR RADICULOPATHY: ICD-10-CM

## 2025-02-17 DIAGNOSIS — M48.062 LUMBAR STENOSIS WITH NEUROGENIC CLAUDICATION: Primary | ICD-10-CM

## 2025-02-17 DIAGNOSIS — M96.1 CERVICAL POST-LAMINECTOMY SYNDROME: ICD-10-CM

## 2025-02-17 PROCEDURE — 1159F MED LIST DOCD IN RCRD: CPT | Performed by: ANESTHESIOLOGY

## 2025-02-17 PROCEDURE — 99213 OFFICE O/P EST LOW 20 MIN: CPT | Performed by: ANESTHESIOLOGY

## 2025-02-17 PROCEDURE — 3079F DIAST BP 80-89 MM HG: CPT | Performed by: ANESTHESIOLOGY

## 2025-02-17 PROCEDURE — 3077F SYST BP >= 140 MM HG: CPT | Performed by: ANESTHESIOLOGY

## 2025-02-17 RX ORDER — OXYCODONE AND ACETAMINOPHEN 10; 325 MG/1; MG/1
1 TABLET ORAL 3 TIMES DAILY PRN
Qty: 84 TABLET | Refills: 0 | Status: SHIPPED | OUTPATIENT
Start: 2025-02-24

## 2025-02-17 RX ORDER — OXYCODONE AND ACETAMINOPHEN 10; 325 MG/1; MG/1
1 TABLET ORAL 3 TIMES DAILY PRN
Qty: 84 TABLET | Refills: 0 | Status: SHIPPED | OUTPATIENT
Start: 2025-04-21 | End: 2025-05-19

## 2025-02-17 RX ORDER — NALOXONE HYDROCHLORIDE 4 MG/.1ML
1 SPRAY NASAL AS NEEDED
Qty: 2 EACH | Refills: 0 | Status: SHIPPED | OUTPATIENT
Start: 2025-02-17

## 2025-02-17 RX ORDER — OXYCODONE AND ACETAMINOPHEN 10; 325 MG/1; MG/1
1 TABLET ORAL 3 TIMES DAILY PRN
Qty: 84 TABLET | Refills: 0 | Status: SHIPPED | OUTPATIENT
Start: 2025-03-24 | End: 2025-04-21

## 2025-02-17 RX ORDER — GABAPENTIN 300 MG/1
CAPSULE ORAL
Qty: 42 CAPSULE | Refills: 0 | Status: SHIPPED | OUTPATIENT
Start: 2025-02-17

## 2025-02-17 ASSESSMENT — PAIN SCALES - GENERAL: PAINLEVEL_OUTOF10: 10 - WORST POSSIBLE PAIN

## 2025-02-17 ASSESSMENT — PAIN - FUNCTIONAL ASSESSMENT: PAIN_FUNCTIONAL_ASSESSMENT: 0-10

## 2025-02-17 ASSESSMENT — PAIN DESCRIPTION - DESCRIPTORS: DESCRIPTORS: ACHING;SHOOTING;SHARP

## 2025-02-17 NOTE — PROGRESS NOTES
"Pain Management Clinic Note     History Of Present Illness  Phuc Cheek is a 74 y.o. male with a history of low back pain radiating to the posterior aspect of his left buttock. He received a left biased L4-5 epidural steroid injection on 1/30/25. A few days after the injection, he experienced sharp pain in his R buttock and his R leg gave out on him while he was walking. The symptoms resided, and then approximately 2 days later, he began having sharp, shooting pain radiating from the left buttock, down the lateral aspect of his left thigh, to his posterior left calf, down to the plantar aspect of his left foot. His left foot occasionally feels \"asleep.\" He denies weakness, falls, saddle anesthesia, bladder and bowel incontinence. His pain is exacerbated by activity and lying on a firm mattress. His pain is 10/10 in severity.     His pain has been managed with Robaxin 750mg TID, percocet 10mg TID, and ropinirole 1mg TID. His medications typically relieve his pain 90%, but have not been effective in treating his new onset pain. He is able to independently manage his activities of daily living and has an average overall quality of family/social life with his current treatment. His controlled substance agreement will be updated today. Urine drug screen is up to date.     The pain causes significant stress in the patient's life, specifically interferes with general activity, mood, walking ability, ability to perform tasks at home and/or work.  Patient participates in physical therapy and continues to perform physician directed exercises at home. Denies any bowel or bladder incontinence, saddle anesthesia, worsening pain, weakness or falls.     Past Medical History  He has a past medical history of Acute bronchitis (02/16/2024), Arthritis (Unknown), Basal cell carcinoma of skin, unspecified, Cellulitis of lower extremity (02/16/2024), CHF (congestive heart failure) (2014), Disease due to severe acute respiratory " syndrome coronavirus 2 (SARS-CoV-2) (04/11/2023), Diverticulosis of intestine, part unspecified, without perforation or abscess without bleeding, Eczema (04/11/2023), Heart disease (1999), Hematuria (02/16/2024), HLD (hyperlipidemia) (04/11/2023), Hypertension (1999), Myocardial infarction (Multi) (11/5/2014), Noninfective gastroenteritis and colitis, unspecified, Other abnormal glucose, Personal history of (healed) traumatic fracture, Personal history of other diseases of the circulatory system, Personal history of other diseases of the digestive system, Personal history of other diseases of the digestive system, Personal history of other diseases of the digestive system, Personal history of other diseases of the musculoskeletal system and connective tissue, Personal history of other diseases of the nervous system and sense organs, Personal history of other diseases of the respiratory system, Personal history of other medical treatment, Personal history of other specified conditions, Personal history of urinary calculi, Restless legs syndrome, Right lower quadrant abdominal pain (02/24/2023), Scalp hematoma (02/16/2024), Sore throat (04/11/2023), Thrush, oral (04/11/2023), Umbilical hernia without obstruction or gangrene, Unspecified atherosclerosis, and Varicella (unknown).    Surgical History  He has a past surgical history that includes Tonsillectomy (07/22/2016); Cervical fusion (07/22/2016); Hand tendon surgery (07/22/2016); Knee surgery (07/22/2016); Other surgical history (07/22/2016); Coronary artery bypass graft (07/22/2016); Other surgical history (10/08/2021); Other surgical history (02/17/2017); Esophagogastroduodenoscopy (02/17/2017); Splenectomy, total (02/17/2017); Colonoscopy (02/17/2017); Other surgical history (02/17/2017); Other surgical history (02/17/2017); Other surgical history (02/17/2017); Hemorrhoid surgery (02/17/2017); Appendectomy (02/17/2017); Other surgical history (02/17/2017); Other  surgical history (02/17/2017); Other surgical history (02/17/2017); Hernia repair (02/17/2017); MR angio head wo IV contrast (05/28/2016); MR angio neck wo IV contrast (05/28/2016); MR angio head wo IV contrast (05/03/2018); MR angio neck wo IV contrast (05/03/2018); MR angio head wo IV contrast (05/03/2018); Coronary stent placement (1999,2000,2014); Joint replacement (4/29/2014 &3/14/2022 knees); Sinus surgery (10/29/2014); Spine surgery (1993 & 2018 neck fusion); and Cardiac catheterization (1999,2000).     Social History  He reports that he has never smoked. He has never used smokeless tobacco. He reports current alcohol use of about 1.0 - 2.0 standard drink of alcohol per week. He reports that he does not use drugs.    Family History  Family History   Problem Relation Name Age of Onset    Cancer Mother MOM         breast    Heart disease Mother MOM     Breast cancer Mother MOM     Arthritis Mother MOM     Hyperlipidemia Mother MOM     Hypertension Mother MOM     Heart attack Mother MOM     Arthritis Father DAD     Other (bladder cancer) Father DAD     Diabetes Father DAD     Heart disease Father DAD     Other (cabg) Father DAD     Hypertension Father DAD     Heart attack Father DAD     Skin cancer Father DAD     Diabetes Sister Sister     Arthritis Sister Sister     Goiter Sister Sister     Hyperlipidemia Sister Sister     Heart disease Sister Sister only     Accidental death Brother Brother     Arthritis Brother Brother     Hyperlipidemia Brother Brother     Heart disease Brother Brother only         Allergies  Adhesive tape-silicones, Bee venom protein (honey bee), Niacin, Simvastatin, and Tetracyclines    Review of Symptoms:   Constitutional: Negative for chills, diaphoresis or fever  HENT: Negative for neck swelling  Eyes:.  Negative for eye pain  Respiratory:.  Negative for cough, shortness of breath or wheezing    Cardiovascular:.  Negative for chest pain or palpitations  Gastrointestinal:.  Negative for  abdominal pain, nausea and vomiting  Genitourinary:.  Negative for urgency  Musculoskeletal:  Positive for back pain. Positive for joint pain. Denies falls within the past 3 months.  Skin: Negative for wounds or itching   Neurological: Negative for dizziness, seizures, loss of consciousness and weakness  Endo/Heme/Allergies: Does not bruise/bleed easily  Psychiatric/Behavioral: Negative for depression. The patient does not appear anxious.       Physical Exam     Advanced Exam   Inspection: No gross deformities, no surgical scars  Palpation: No tenderness of patient of lumbar midline, lumbar paraspinals, bilateral SI joints  ROM: Normal range of motion of the lumbar flexion extension  Motor: 5/5 strength upper and lower extremities  Sensory: Negative for sensory abnormalities in upper and lower extremities  Reflexes: 2+ reflexes bilateral upper and lower extremities  Lumbar: Positive straight leg raising bilaterally  Hip: Negative for pain with anterior, lateral, posterior palpation of hip joints, negative for internal/external rotation of the hip     Last Recorded Vitals  /82   Pulse 65   Resp 18   SpO2 94%     Relevant Results  Current Outpatient Medications   Medication Instructions    acetaminophen (TYLENOL) 1,000 mg, Every 6 hours PRN    atenolol (TENORMIN) 25 mg, oral, Daily    cetirizine (ZYRTEC) 10 mg, Daily    cholecalciferol (Vitamin D-3) 50 MCG (2000 UT) tablet 1 each orally once a day    clopidogrel (PLAVIX) 75 mg, oral, Daily    cyanocobalamin (Vitamin B-12) 500 mcg tablet 1 tablet, Daily    EPINEPHrine 0.3 mg/0.3 mL injection syringe 1 Syringe, Once as needed    ezetimibe (ZETIA) 10 mg, oral, Daily    famotidine (PEPCID) 20 mg, Daily    folic acid (Folvite) 800 mcg tablet 1 tablet, Daily    furosemide (Lasix) 20 mg tablet TAKE 1 TABLET BY MOUTH THREE TIMES A WEEK    lisinopril 10 mg, oral, Daily, for blood pressure    methocarbamol (ROBAXIN) 750 mg, oral, 3 times daily PRN    oxyCODONE  (ROXICODONE) 10 mg, oral, 3 times daily PRN    oxyCODONE-acetaminophen (Percocet)  mg tablet 1 tablet, oral, 3 times daily PRN    oxyCODONE-acetaminophen (Percocet)  mg tablet 1 tablet, oral, 3 times daily PRN    oxyCODONE-acetaminophen (Percocet)  mg tablet 1 tablet, oral, 3 times daily PRN    oxyCODONE-acetaminophen (Percocet)  mg tablet 1 tablet, oral, 3 times daily PRN    potassium chloride CR (Klor-Con) 10 mEq ER tablet Take 2 tablets by mouth 3 x weekly    pravastatin (Pravachol) 10 mg tablet TAKE 1 TABLET BY MOUTH THREE TIMES A WEEK    rOPINIRole (REQUIP) 1 mg, oral, 3 times daily    zinc gluconate 50 mg tablet 50 mg of elemental zinc, 3 times weekly         MR cervical spine wo IV contrast 03/08/2024    Narrative  Interpreted By:  Sandoval Alejandro,  STUDY:  MR CERVICAL SPINE WO IV CONTRAST;  3/8/2024 4:24 pm    INDICATION:  Signs/Symptoms:cervical pain.    COMPARISON:  MRI of the cervical spine dated 09/14/2022; CT of the cervical spine  dated 09/29/2023;    ACCESSION NUMBER(S):  [id number]    ORDERING CLINICIAN:  GERONIMO TAVAREZ    TECHNIQUE:  Sagittal T1, T2, STIR, axial T1 and axial T2 weighted images were  acquired through the cervical spine.    FINDINGS:  Postsurgical changes of in osseous interbody fusion extending from C5  through C7 with intervertebral disc spacer at C4-C5 are again  demonstrated, similar in appearance to prior MRI in September of 2022.    Cervical vertebral alignment is maintained, without evidence of  significant spondylolisthesis.    Cervical vertebral body heights are preserved without evidence of  compression fractures. No new STIR hyperintense edema is present in  the cervical vertebral marrow.    Mild STIR hyperintense edema is present in the pedicles of C7 on the  left, likely due to underlying degenerate facet osteoarthropathy.  Facet joints themselves are preserved without evidence of subluxation  or perching.    Craniocervical junction is  intact with degenerative changes at the  atlantoaxial articulation extending into the anterior epidural space  at the level of C1 and somewhat efface in the adjacent subarachnoid  space.    Cervical spinal cord does not demonstrate any intramedullary cord  signal changes.    C2-C3: No significant spinal canal stenosis is evident. Mild  left-sided neural foraminal narrowing is present due to uncovertebral  and facet joint hypertrophy, similar in appearance to prior exam.    C3-C4: Combination of mild circumferential disc bulging and  ligamentum flavum thickening efface the subarachnoid space, without  significant spinal canal narrowing. There is moderate right-sided and  mild-to-moderate left-sided neural foraminal stenosis due to  uncovertebral and facet joint hypertrophy, similar in appearance to  prior exam.    C4-C5: Posterior osteophytic spurring slightly deforms the anterior  subarachnoid space without spinal canal narrowing. Mild bilateral  neural foraminal stenosis is present due to uncovertebral and facet  joint hypertrophy, similar in appearance to prior exam.    C5-C6: No significant posterior disc contour abnormality is present.  No significant neural foraminal stenosis is evident.    C6-C7: No significant posterior disc contour abnormality or spinal  canal stenosis is present. Mild bilateral neural foraminal narrowing  is noted due to uncovertebral and facet joint hypertrophy.    C7-T1: There is some effacement of the anterior subarachnoid space  due to spondylolisthesis and dorsal subarachnoid space due to  ligamentum flavum thickening with mild underlying spinal canal  narrowing, similar in appearance to prior examination. Mild bilateral  neural foraminal stenosis is present due to uncovertebral and facet  joint hypertrophy, similar to prior study.    Prevertebral and paraspinal soft tissues do not demonstrate any acute  abnormality.    Impression  1.  Multilevel degenerative changes are again  demonstrated in the  cervical spine, not significantly progressed since prior exam in  September of 2022, although there is persistent mild spinal canal  narrowing present at the level of C7-T1 due to ligamentum flavum  thickening and disc osteophyte complex. Neural foraminal degenerative  changes are also similar in appearance to prior exam.  2. Postsurgical changes of interbody fusion extending from C5 through  C7 with intervertebral disc spacer at C4-C5 again demonstrated,  similar in appearance to prior MRI in September of 2022.    MACRO:  None    Signed by: Sandoval Alejandro 3/10/2024 4:40 PM  Dictation workstation:   [id number]      MR cervical spine wo IV contrast 09/14/2022    Narrative  MRN: [id number]  Patient Name: HEATHER GUERRERO    STUDY:  MRI CERVICAL WO;  9/14/2022 7:47 pm    INDICATION:  neck pain  M96.1: Cervical post-laminectomy syndrome.    COMPARISON:  MRI cervical spine, 09/15/2016    ACCESSION NUMBER(S):  [id number]    ORDERING CLINICIAN:  GONZALEZ BA    TECHNIQUE:  Sagittal T1, T2, STIR, axial T1 and axial T2 weighted images were  acquired through the cervical spine.    FINDINGS:  There 7 cervical vertebral bodies.    There are postoperative changes of interbody fusion extending from  C5-C7. There is susceptibility artifact at the C4-C5 intervertebral  disc space consistent with intervertebral disc spacer, new since the  prior MRI.    The remaining vertebral bodies demonstrate expected height. The  intervertebral discs at C2-C3, C3-C4, and C7-T1 demonstrate expected  signal and morphology.    There is grade 1 anterolisthesis of C2-C3, C3-C4, and C7-T1.    The cervical spinal cord is normal in caliber and signal.    C1-C2: The cervicomedullary junction is normal in appearance. No  spinal canal or neural foraminal stenosis.    C2-C3: There is no posterior disc contour abnormality. There is no  spinal canal stenosis. There is mild left neural foraminal narrowing  due to uncovertebral hypertrophy  and marked facet arthropathy,  unchanged.    C3-C4: There is a stable disc osteophyte complex which partially  effaces the ventral thecal sac. There is no striking spinal canal  stenosis. There is moderate left and moderate-to-marked right neural  foraminal narrowing due to uncovertebral hypertrophy and marked facet  osteoarthropathy, mildly progressed on the right.    C4-C5: There is no posterior disc contour abnormality. There is no  spinal canal stenosis. There is moderate bilateral neural foraminal  narrowing due to uncovertebral hypertrophy, essentially unchanged.  There is at most mild right facet osteoarthropathy.    C5-C6: There is a tiny right paracentral posterior osteophyte which  impresses upon the ventral thecal sac. No spinal canal or neural  foraminal stenosis. No facet osteoarthropathy.    C6-C7: No spinal canal or neural foraminal stenosis. No posterior  disc contour abnormality or facet osteoarthropathy.    C7-T1: There is a small pseudo bulge along with moderate ligamentum  flavum hypertrophy which demonstrates more focal protrusion on the  right. Ligamentum flavum impresses upon the posterior right thecal  sac resulting in mild spinal canal stenosis, new since prior. There  is mild narrowing of the right lateral recess without apparent neural  foraminal stenosis. There is overall marked bilateral facet  osteoarthropathy, progressed since prior.    The upper thoracic vertebrae and spinal canal are within normal  limits.    There is mild atrophy of the paraspinous musculature.    Impression  1.  Multilevel degenerative changes of the cervical spine as detailed  above, most pronounced a C7-T1 with new mild spinal canal stenosis  due to ligamentum flavum thickening and protrusion and interval  increase in facet osteoarthropathy.  2. Osseous interbody fusion extending from C5-C7 with an  intervertebral disc spacer at C4-C5.  3. Slight grade 1 anterolisthesis of C2-C3, C3-C4, and C7-T1, new  when  compared to prior exam in 2016.      I personally reviewed the images/study and I agree with the findings  as stated. This study was interpreted at [facility], [place], [place].     Epidural Steroid Injection  Table formatting from the original result was not included.  Procedure  Epidural Steroid Injection    Indication  Lumbar stenosis with neurogenic claudication    Medications  lidocaine PF (Xylocaine) 5 mg/mL (0.5 %) injection 13 mL   methylPREDNISolone acetate (DEPO-Medrol) injection 40 mg   iohexol (OMNIPaque) 240 mg iodine/mL solution 5 mL   sodium bicarbonate injection 1 mEq   sodium chloride (PF) 0.9% solution 5 mL   (Totals for administrations occurring from 1130 to 1147 on 01/30/25)     Preprocedure  A history and physical has been performed, and patient medication   allergies have been reviewed. The patient's tolerance of previous   anesthesia has been reviewed. The risks and benefits of the procedure and   the sedation options and risks were discussed with the patient. All   questions were answered and informed consent obtained.    Details of the Procedure  History reviewed patient interviewed and examined.  Risks and benefits of   procedure discussed patient agreed to proceed and consent was signed.  A   second identification was done in the operating room.  With the patient in   the prone position and under IV sedation the back was prepped and draped   in a sterile fashion.  Under fluoroscopic guidance  L4-L5 interspace was   identified.  Lidocaine 0.5% was used for skin and tissue infiltration.  An   18-gauge 3.5 inch Touhy needle was inserted into the epidural space under   fluoroscopic guidance in AP and lateral view and with loss-of-resistance   technique.  IV contrast showed adequate left-sided epidural spread without   any vascular or intrathecal uptake confirmed in AP and lateral views..    Depo-Medrol 40 mg mixed with lidocaine 0.5% a total of 5 cc was  injected.    Washout view showed good epidural spread in the AP view.    Needle removed Band-Aid applied.  Patient tolerated the procedure without   any problems and was transferred to the recovery room in stable condition.       Procedure Provider  Osvaldo Kuhn MD    Procedure Location  [address] [place] [address]    Referring Provider  Osvaldo Kuhn MD  [address] [place] [address]       1. Lumbar stenosis with neurogenic claudication        2. Cervical post-laminectomy syndrome             ASSESSMENT/PLAN  Phuc Cheek is a 74 y.o. male with history of low back pain s/p recent L4-5 TIFFANIE on 1/30/25 now presenting with new-onset pain radiating down the left lower extremity to the plantar aspect of his foot. His most recent MRI was in 2022 which did not show any significant stenosis, but given his new symptoms and physical exam findings, we should obtain updated imaging to assess for progression.     Our plan is as follows:  - Start gabapentin 300mg at bedtime with plan to uptitrate to 900mg at bedtime as tolerated  - Obtain MRI lumbar spine   - Continue percocet 10mg TID as needed. Controlled substance agreement updated in office today. Urine drug screen up to date.   - Continue Robaxin 750mg TID and ropinirole 1mg TID.        Thalia Peters MD

## 2025-02-20 ENCOUNTER — TELEPHONE (OUTPATIENT)
Dept: PAIN MEDICINE | Facility: CLINIC | Age: 75
End: 2025-02-20
Payer: MEDICARE

## 2025-02-20 DIAGNOSIS — M48.062 LUMBAR STENOSIS WITH NEUROGENIC CLAUDICATION: ICD-10-CM

## 2025-02-20 DIAGNOSIS — M47.816 LUMBAR SPONDYLOSIS: ICD-10-CM

## 2025-02-20 DIAGNOSIS — M47.816 LUMBAR FACET ARTHROPATHY: ICD-10-CM

## 2025-02-20 RX ORDER — DIAZEPAM 5 MG/1
TABLET ORAL
Qty: 4 TABLET | Refills: 0 | Status: SHIPPED | OUTPATIENT
Start: 2025-02-20

## 2025-03-03 PROBLEM — H53.2 DIPLOPIA: Status: ACTIVE | Noted: 2025-03-03

## 2025-03-03 PROBLEM — J01.90 ACUTE BACTERIAL SINUSITIS: Status: RESOLVED | Noted: 2025-03-03 | Resolved: 2025-03-03

## 2025-03-03 PROBLEM — J40 BRONCHITIS: Status: RESOLVED | Noted: 2024-02-16 | Resolved: 2025-03-03

## 2025-03-03 PROBLEM — E78.00 PURE HYPERCHOLESTEROLEMIA: Status: ACTIVE | Noted: 2025-03-03

## 2025-03-03 PROBLEM — B96.89 ACUTE BACTERIAL SINUSITIS: Status: RESOLVED | Noted: 2025-03-03 | Resolved: 2025-03-03

## 2025-03-03 RX ORDER — KETOCONAZOLE 20 MG/G
CREAM TOPICAL
COMMUNITY
Start: 2024-12-30

## 2025-03-06 ENCOUNTER — HOSPITAL ENCOUNTER (OUTPATIENT)
Dept: RADIOLOGY | Facility: CLINIC | Age: 75
Discharge: HOME | End: 2025-03-06
Payer: MEDICARE

## 2025-03-06 ENCOUNTER — APPOINTMENT (OUTPATIENT)
Dept: PRIMARY CARE | Facility: CLINIC | Age: 75
End: 2025-03-06
Payer: MEDICARE

## 2025-03-06 VITALS
DIASTOLIC BLOOD PRESSURE: 68 MMHG | OXYGEN SATURATION: 96 % | BODY MASS INDEX: 26.62 KG/M2 | WEIGHT: 185.5 LBS | SYSTOLIC BLOOD PRESSURE: 103 MMHG | TEMPERATURE: 98.6 F | HEART RATE: 66 BPM

## 2025-03-06 DIAGNOSIS — M25.532 LEFT WRIST PAIN: ICD-10-CM

## 2025-03-06 DIAGNOSIS — F11.20 CONTINUOUS OPIOID DEPENDENCE (MULTI): ICD-10-CM

## 2025-03-06 DIAGNOSIS — I50.9 CHRONIC CONGESTIVE HEART FAILURE, UNSPECIFIED HEART FAILURE TYPE: ICD-10-CM

## 2025-03-06 DIAGNOSIS — I10 PRIMARY HYPERTENSION: Primary | ICD-10-CM

## 2025-03-06 DIAGNOSIS — E66.01 OBESITY, MORBID (MULTI): ICD-10-CM

## 2025-03-06 LAB
ALBUMIN SERPL-MCNC: 4.3 G/DL (ref 3.6–5.1)
ALP SERPL-CCNC: 63 U/L (ref 35–144)
ALT SERPL-CCNC: 12 U/L (ref 9–46)
ANION GAP SERPL CALCULATED.4IONS-SCNC: 7 MMOL/L (CALC) (ref 7–17)
AST SERPL-CCNC: 16 U/L (ref 10–35)
BILIRUB SERPL-MCNC: 0.9 MG/DL (ref 0.2–1.2)
BUN SERPL-MCNC: 16 MG/DL (ref 7–25)
CALCIUM SERPL-MCNC: 9.6 MG/DL (ref 8.6–10.3)
CHLORIDE SERPL-SCNC: 104 MMOL/L (ref 98–110)
CO2 SERPL-SCNC: 26 MMOL/L (ref 20–32)
CREAT SERPL-MCNC: 0.89 MG/DL (ref 0.7–1.28)
EGFRCR SERPLBLD CKD-EPI 2021: 89 ML/MIN/1.73M2
GLUCOSE SERPL-MCNC: 80 MG/DL (ref 65–139)
POTASSIUM SERPL-SCNC: 5 MMOL/L (ref 3.5–5.3)
PROT SERPL-MCNC: 6.7 G/DL (ref 6.1–8.1)
SODIUM SERPL-SCNC: 137 MMOL/L (ref 135–146)
URATE SERPL-MCNC: 5.5 MG/DL (ref 4–8)

## 2025-03-06 PROCEDURE — 1124F ACP DISCUSS-NO DSCNMKR DOCD: CPT | Performed by: FAMILY MEDICINE

## 2025-03-06 PROCEDURE — 99214 OFFICE O/P EST MOD 30 MIN: CPT | Performed by: FAMILY MEDICINE

## 2025-03-06 PROCEDURE — 1159F MED LIST DOCD IN RCRD: CPT | Performed by: FAMILY MEDICINE

## 2025-03-06 PROCEDURE — 1125F AMNT PAIN NOTED PAIN PRSNT: CPT | Performed by: FAMILY MEDICINE

## 2025-03-06 PROCEDURE — 73110 X-RAY EXAM OF WRIST: CPT | Mod: LT

## 2025-03-06 PROCEDURE — 3074F SYST BP LT 130 MM HG: CPT | Performed by: FAMILY MEDICINE

## 2025-03-06 PROCEDURE — G2211 COMPLEX E/M VISIT ADD ON: HCPCS | Performed by: FAMILY MEDICINE

## 2025-03-06 PROCEDURE — 1036F TOBACCO NON-USER: CPT | Performed by: FAMILY MEDICINE

## 2025-03-06 PROCEDURE — 3078F DIAST BP <80 MM HG: CPT | Performed by: FAMILY MEDICINE

## 2025-03-06 RX ORDER — PREDNISONE 10 MG/1
TABLET ORAL
Qty: 20 TABLET | Refills: 0 | Status: SHIPPED | OUTPATIENT
Start: 2025-03-06 | End: 2025-03-19

## 2025-03-06 ASSESSMENT — ENCOUNTER SYMPTOMS
CHILLS: 0
WHEEZING: 0
APNEA: 0
DIZZINESS: 0
LIGHT-HEADEDNESS: 0
HEADACHES: 0
PALPITATIONS: 0
UNEXPECTED WEIGHT CHANGE: 0
CHOKING: 0
COUGH: 0
SHORTNESS OF BREATH: 0
DIAPHORESIS: 0
FEVER: 0
CHEST TIGHTNESS: 0

## 2025-03-06 ASSESSMENT — PAIN SCALES - GENERAL: PAINLEVEL_OUTOF10: 10-WORST PAIN EVER

## 2025-03-06 NOTE — PATIENT INSTRUCTIONS
Please return for a(n) blood pressure, cholesterol, and medication follow-up appointment and Wellness visit in August 2025, after tests to review results and options, earlier if any question or concern. Please schedule additional problem-focused appointment(s) to address additional problem(s).    Recommended vaccines:  Influenza, annual  Shingrix (shingles) vaccine series  Avoid taking Biotin (a vitamin, shows up particularly in hair/nail supplements) for a week prior to any blood tests, as it can interfere with certain results. Fasting for labs means 12 hours, nothing to eat or drink, except water and medications, unless directed otherwise.    For assistance with scheduling referrals or consultations, please call [phone]. For laboratory, radiology, and other tests, please call [phone] ([phone] for pediatrics). Please review prescription inserts and published information for possible adverse effects of all medications. Return after testing or consultation to review results and recommendations, if symptoms persist, change, worsen, or return, or if you have any question or concern. If you do not get results within 7-10 days, or you have any question or concern, please send a message, call the office ([phone]), or return to the office for a follow-up appointment. For non-emergencies, you may call the office. For emergencies, call 9-1-1 or go to the nearest Emergency Department. Please schedule additional appointment(s) to address concern(s) not addressed today. An annual Wellness visit is strongly recommended. A Wellness visit should be dedicated to addressing routine health maintenance matters (e.g., cancer screenings, cardiovascular screening, etc.). Problem-focused visits, typically prompted by symptoms or specific concerns, are usually conducted separately, particularly if multiple or complex problems need to be addressed.    In general, results are not released or discussed over the  telephone, but at an appointment or via  [facility]. Results of tests done through [facility] are released via  [facility] (see below).  [email or web address]   [facility] support line: [phone]

## 2025-03-06 NOTE — ASSESSMENT & PLAN NOTE
Well compensated. Per cardiology.        What Type Of Note Output Would You Prefer (Optional)?: Standard Output How Severe Are Your Spot(S)?: mild Have Your Spot(S) Been Treated In The Past?: has not been treated Hpi Title: Evaluation of Skin Lesions

## 2025-03-06 NOTE — PROGRESS NOTES
Subjective   Patient ID: Phuc Cheek is a 74 y.o. male who presents for Follow-up (Pt presents with spouse for 6 month check up, c/o L wrist pain, denies injury, x2 wks, no refills.).  HPI Historian(s): Self and Wife    Generally feeling well.  c/o left wrist pain, awoke with it 2w ago.   Onset of symptoms was 2 weeks ago.  Symptoms have been gradually worsening since that time.  Severity is moderate  Tried Temo cream, which didn't help; wrist brace, which helps, and without it fingers get stiff.  Improved or relieved by wrist brace  Exacerbated by movement  Also c/o swelling  Denies redness, bruising, weakness, numbness, tingling  Completed Medrol dose pack for oral issue a couple weeks ago.    Had back injection that he feels has worsened his sciatica.    Review of Systems   Constitutional:  Negative for chills, diaphoresis, fever and unexpected weight change.   Eyes:  Negative for visual disturbance.   Respiratory:  Negative for apnea (no PND), cough, choking, chest tightness, shortness of breath and wheezing.    Cardiovascular:  Negative for chest pain, palpitations and leg swelling.   Neurological:  Negative for dizziness, syncope, light-headedness and headaches.   All other systems reviewed and are negative.    No LMP for male patient.    Patient Care Team:  Salvador Rachel DO as PCP - General (Family Medicine)  Salvador Rachel DO as PCP - [facility] [place] Attributed Provider  Cecilio Osman MD as Referring Physician (Cardiology)  Osvaldo Kuhn MD as Anesthesiologist (Pain Medicine)  Marnie Chavez MD as Referring Physician (Endocrinology)  Ya Maldonado MD as Referring Physician (Internal Medicine)  Vale Johnson (Neurology)  Jayce Valdez DO as Surgeon (Orthopaedic Surgery)  Ruy Steiner MD as Referring Physician (Internal Medicine)    Current Outpatient Medications   Medication Instructions    acetaminophen (TYLENOL) 1,000 mg, Every 6 hours PRN    atenolol (TENORMIN) 25 mg, oral,  Daily    cetirizine (ZYRTEC) 10 mg, Daily    cholecalciferol (Vitamin D-3) 50 MCG (2000 UT) tablet 1 each orally once a day    clopidogrel (PLAVIX) 75 mg, oral, Daily    cyanocobalamin (Vitamin B-12) 500 mcg tablet 1 tablet, Daily    diazePAM (Valium) 5 mg tablet TAKE 1-2 TABS PO ONE HOUR PRIOR TO MRI. MAY REPEAT UPON ARRIVAL TO MRI IF NEEDED (must have a )    EPINEPHrine 0.3 mg/0.3 mL injection syringe 1 Syringe, Once as needed    ezetimibe (ZETIA) 10 mg, oral, Daily    famotidine (PEPCID) 20 mg, Daily    folic acid (Folvite) 800 mcg tablet 1 tablet, Daily    furosemide (Lasix) 20 mg tablet TAKE 1 TABLET BY MOUTH THREE TIMES A WEEK    gabapentin (Neurontin) 300 mg capsule Take 1 cap PO for 1 week at bedtime, then increase to 2 caps PO for 1 week at bedtime, then increase to 3 caps PO for 1 week at bedtime and continue this dose then call the office for refills.    ketoconazole (NIZOral) 2 % cream APPLY CREAM TOPICALLY TO AFFECTED AREA ON TOENAILS ONCE DAILY    lisinopril 10 mg, oral, Daily, for blood pressure    methocarbamol (ROBAXIN) 750 mg, oral, 3 times daily PRN    naloxone (NARCAN) 4 mg, nasal, As needed, May repeat every 2-3 minutes if needed, alternating nostrils, until medical assistance becomes available.    oxyCODONE-acetaminophen (Percocet)  mg tablet 1 tablet, oral, 3 times daily PRN    oxyCODONE-acetaminophen (Percocet)  mg tablet 1 tablet, oral, 3 times daily PRN    [START ON 3/24/2025] oxyCODONE-acetaminophen (Percocet)  mg tablet 1 tablet, oral, 3 times daily PRN    [START ON 4/21/2025] oxyCODONE-acetaminophen (Percocet)  mg tablet 1 tablet, oral, 3 times daily PRN    potassium chloride CR (Klor-Con) 10 mEq ER tablet Take 2 tablets by mouth 3 x weekly    pravastatin (Pravachol) 10 mg tablet TAKE 1 TABLET BY MOUTH THREE TIMES A WEEK    predniSONE (Deltasone) 10 mg tablet Take 3 tabs x3 days, then 2 tabs x3 days, then 1 tab x3 days, then half tab x4 days, then stop.     rOPINIRole (REQUIP) 1 mg, oral, 3 times daily    zinc gluconate 50 mg tablet 50 mg of elemental zinc, 3 times weekly     Prior to Admission medications    Medication Sig Start Date End Date Taking? Authorizing Provider   acetaminophen (Tylenol) 500 mg tablet Take 2 tablets (1,000 mg) by mouth every 6 hours if needed.   Yes Historical Provider, MD   atenolol (Tenormin) 25 mg tablet Take 1 tablet by mouth once daily 5/3/24  Yes Salvador Rachel DO   cetirizine (ZyrTEC) 10 mg tablet Take 1 tablet (10 mg) by mouth once daily. 11/19/19  Yes Historical Provider, MD   cholecalciferol (Vitamin D-3) 50 MCG (2000 UT) tablet 1 each orally once a day   Yes Historical Provider, MD   clopidogrel (Plavix) 75 mg tablet Take 1 tablet by mouth once daily 5/3/24  Yes Salvador Rachel DO   cyanocobalamin (Vitamin B-12) 500 mcg tablet Take 1 tablet (500 mcg) by mouth once daily.   Yes Historical Provider, MD   diazePAM (Valium) 5 mg tablet TAKE 1-2 TABS PO ONE HOUR PRIOR TO MRI. MAY REPEAT UPON ARRIVAL TO MRI IF NEEDED (must have a ) 2/20/25  Yes Osvaldo Kuhn MD   EPINEPHrine 0.3 mg/0.3 mL injection syringe Inject 0.3 mL (0.3 mg) into the muscle 1 time if needed for anaphylaxis. 7/10/23  Yes Historical Provider, MD   ezetimibe (Zetia) 10 mg tablet Take 1 tablet by mouth once daily 5/3/24  Yes Salvador Rachel DO   famotidine (Pepcid) 20 mg tablet Take 1 tablet (20 mg) by mouth once daily. 5/5/21  Yes Historical Provider, MD   folic acid (Folvite) 800 mcg tablet Take 1 tablet (800 mcg) by mouth once daily. 12/26/19  Yes Historical Provider, MD   furosemide (Lasix) 20 mg tablet TAKE 1 TABLET BY MOUTH THREE TIMES A WEEK 2/19/24  Yes Salvador Rachel DO   ketoconazole (NIZOral) 2 % cream APPLY CREAM TOPICALLY TO AFFECTED AREA ON TOENAILS ONCE DAILY 12/30/24  Yes Historical Provider, MD   lisinopril 10 mg tablet Take 1 tablet (10 mg) by mouth once daily. for blood pressure 10/5/23  Yes Liza Grigsby, APRN-CNP    methocarbamol (Robaxin) 750 mg tablet Take 1 tablet (750 mg) by mouth 3 times a day as needed for muscle spasms. 12/20/24  Yes JIM Gilmore   oxyCODONE-acetaminophen (Percocet)  mg tablet Take 1 tablet by mouth 3 times a day as needed for severe pain (7 - 10). Do not fill before January 27, 2025. 1/27/25  Yes JIM Gilmore   oxyCODONE-acetaminophen (Percocet)  mg tablet Take 1 tablet by mouth 3 times a day as needed for severe pain (7 - 10). Do not fill before February 24, 2025. 2/24/25  Yes Osvaldo Kuhn MD   oxyCODONE-acetaminophen (Percocet)  mg tablet Take 1 tablet by mouth 3 times a day as needed for severe pain (7 - 10) for up to 28 days. Do not fill before March 24, 2025. 3/24/25 4/21/25 Yes Osvaldo Kuhn MD   oxyCODONE-acetaminophen (Percocet)  mg tablet Take 1 tablet by mouth 3 times a day as needed for severe pain (7 - 10) for up to 28 days. Do not fill before April 21, 2025. 4/21/25 5/19/25 Yes Osvaldo Kuhn MD   potassium chloride CR (Klor-Con) 10 mEq ER tablet Take 2 tablets by mouth 3 x weekly 2/19/24  Yes Salvador Rachel DO   pravastatin (Pravachol) 10 mg tablet TAKE 1 TABLET BY MOUTH THREE TIMES A WEEK 5/3/24  Yes Salvador Rachel DO   rOPINIRole (Requip) 1 mg tablet Take 1 tablet (1 mg) by mouth 3 times a day. 12/20/24 3/20/25 Yes JIM Gilmore   zinc gluconate 50 mg tablet Take 1 tablet (50 mg of elemental zinc) by mouth 3 times a week.  Patient taking differently: Take 1 tablet (50 mg of elemental zinc) by mouth 3 times a week. On and off currently off   Yes Historical Provider, MD   gabapentin (Neurontin) 300 mg capsule Take 1 cap PO for 1 week at bedtime, then increase to 2 caps PO for 1 week at bedtime, then increase to 3 caps PO for 1 week at bedtime and continue this dose then call the office for refills.  Patient not taking: Reported on 3/6/2025 2/17/25   Osvaldo Kuhn MD   naloxone (Narcan) 4 mg/0.1 mL nasal  spray Administer 1 spray (4 mg) into affected nostril(s) if needed for opioid reversal. May repeat every 2-3 minutes if needed, alternating nostrils, until medical assistance becomes available.  Patient not taking: Reported on 3/6/2025 2/17/25   Osvaldo Kuhn MD        Objective   /68   Pulse 66   Temp 37 °C (98.6 °F) (Oral)   Wt 84.1 kg (185 lb 8 oz)   SpO2 96%   BMI 26.62 kg/m²           Physical Exam  Vitals and nursing note reviewed.   Constitutional:       General: He is not in acute distress.     Appearance: Normal appearance.      Comments: left wrist brace   HENT:      Head: Normocephalic and atraumatic.   Eyes:      General: No scleral icterus.     Extraocular Movements: Extraocular movements intact.      Conjunctiva/sclera: Conjunctivae normal.   Pulmonary:      Effort: Pulmonary effort is normal. No respiratory distress.   Musculoskeletal:         General: Swelling (base of thumb MCP and surrounding area of wrist) and tenderness (left snuff box and extensor tendons) present.   Skin:     General: Skin is warm and dry.      Coloration: Skin is not jaundiced.   Neurological:      Mental Status: He is alert and oriented to person, place, and time. Mental status is at baseline.   Psychiatric:         Behavior: Behavior normal.         Assessment & Plan  Primary hypertension  Well controlled.   Orders:    Follow Up In Primary Care - Medicare Annual; Future    Left wrist pain  Suspect tendinitis v. arthritis, possibly rebound inflammation after Medrol dose pack. Plain films, uric acid, cold compresses, Tylenol. Try Prednisone taper, anticipate this will be helpful.  Orders:    XR wrist left 3+ views; Future    Uric Acid; Future    Comprehensive Metabolic Panel; Future    predniSONE (Deltasone) 10 mg tablet; Take 3 tabs x3 days, then 2 tabs x3 days, then 1 tab x3 days, then half tab x4 days, then stop.    Continuous opioid dependence (Multi)  Per pain management.       Chronic congestive heart  failure, unspecified heart failure type  Well compensated. Per cardiology.       Obesity, morbid (Multi)  Therapeutic lifestyle changes.

## 2025-03-06 NOTE — ASSESSMENT & PLAN NOTE
Suspect tendinitis v. arthritis, possibly rebound inflammation after Medrol dose pack. Plain films, uric acid, cold compresses, Tylenol. Try Prednisone taper, anticipate this will be helpful.  Orders:    XR wrist left 3+ views; Future    Uric Acid; Future    Comprehensive Metabolic Panel; Future    predniSONE (Deltasone) 10 mg tablet; Take 3 tabs x3 days, then 2 tabs x3 days, then 1 tab x3 days, then half tab x4 days, then stop.

## 2025-03-07 ENCOUNTER — HOSPITAL ENCOUNTER (OUTPATIENT)
Dept: RADIOLOGY | Facility: HOSPITAL | Age: 75
Discharge: HOME | End: 2025-03-07
Payer: MEDICARE

## 2025-03-07 DIAGNOSIS — M54.16 LUMBAR RADICULOPATHY: ICD-10-CM

## 2025-03-07 PROCEDURE — 72148 MRI LUMBAR SPINE W/O DYE: CPT

## 2025-03-14 ENCOUNTER — TELEPHONE (OUTPATIENT)
Dept: PAIN MEDICINE | Facility: CLINIC | Age: 75
End: 2025-03-14
Payer: MEDICARE

## 2025-03-14 NOTE — TELEPHONE ENCOUNTER
Dr Kuhn reviewed MRI,recommending left L5 TFE. Pt called,pre procedure instructions given. Auth to hold Plavix was obtained for his previous TIFFANIE 1/2025. Pre injection checklist completed and will put on hold. Pt will call if he decides to schedule.

## 2025-04-15 DIAGNOSIS — M96.1 CERVICAL POST-LAMINECTOMY SYNDROME: ICD-10-CM

## 2025-04-15 RX ORDER — METHOCARBAMOL 750 MG/1
750 TABLET, FILM COATED ORAL 3 TIMES DAILY PRN
Qty: 90 TABLET | Refills: 2 | Status: SHIPPED | OUTPATIENT
Start: 2025-04-15

## 2025-04-15 NOTE — TELEPHONE ENCOUNTER
Pharmacy called for medication refill Methocarbamol 750 mg take one tablet TID to be filled to preferred pharmacy. Last follow up 2/20/25. Last refill 12/20/24.

## 2025-04-17 NOTE — PATIENT INSTRUCTIONS
Continued Stay SW/EVELYN Assessment/Plan of Care Note       Active Substitute Decision Maker (SDM)    There are no active Substitute Decision Maker (SDM) on file.       Progress note:    CM spoke with Debby at [place]. No answer regarding referral at this time.     EVELYN sent referral to [facility].     Stephy Steward RN updated.     [facility] p-/ f-  Transportation line -   EVELYN Kilgore [phone]  IVON Keyes [phone]     Active with [facility]  RN/PT/OT       See JAMIE/EVELYN flowsheets for other objective data.    Disposition Recommendations:  Preliminary discharge destination: Planned Discharge Destination: Home with services/support  JAMIE/EVELYN recommendation for discharge: Other (comment) (TBD pending therapy evals)    Destination Pharmacy:        Discharge Plan/Needs:     Continued Care and Services - Admitted Since 4/3/2025       Destination        Service Provider Request Status Services Address Phone Fax    [facility] [place] - [facility] Pending - Request Sent -- [address], [facility] [address] [phone]    [facility] - [facility] Pending - Request Sent -- [address] [facility] [address] [phone]    [facility] - [facility] Pending - No Request Sent -- [address] [facility] [address] [phone]    [facility] - SNF PAN Declined  pt does not have good long term plan. -- [address] [facility] [address] [phone]    [facility] - SNF Declined  Bed not available -- [address] [facility] [address] [phone]    [facility] Declined  Behavior issues or concerns -- [address] [facility] [facility] [facility] [address] [phone]    [facility] Declined  Bed not available -- [address], [facility] [address] [phone]    [facility]  Please follow-up with your gastroenterologist for repeat colonoscopy for colon cancer screening.    Try ice/cold compresses. Please follow-up with Dr. Dodge, if your hip pain persists, worsens, or returns.    Please return for your next Wellness visit in 12 months. Please schedule additional problem-focused appointment(s) to address additional problem(s).    Avoid taking Biotin (a vitamin, shows up particularly in hair/nail supplements) for a week prior to any blood tests, as it can interfere with certain results. Fasting for labs means 12 hours, nothing to eat or drink, except water and medications, unless directed otherwise.    For assistance with scheduling referrals or consultations, please call [phone]. For laboratory, radiology, and other tests, please call [phone] ([phone] for pediatrics). Please review prescription inserts and published information for possible adverse effects of all medications. Return after testing or consultation to review results and recommendations, if symptoms persist, change, worsen, or return, or if you have any question or concern. If you do not get results within 7-10 days, or you have any question or concern, please send a message, call the office ([phone]), or return to the office for a follow-up appointment. For non-emergencies, you may call the office. For emergencies, call 9-1-1 or go to the nearest Emergency Department. Please schedule additional appointment(s) to address concern(s) not addressed today.    In general, results are not released or discussed over the telephone, but at an appointment or via  EventWith. Results of tests done through [facility] are released via  EventWith (see below).  [email or web address]   EventWith support line: [phone]     HOME - PAN SNF Declined  Behavior issues or concerns -- [address] [place] [address] [phone]    [facility] - SNF PAN Declined  Behavior issues or concerns -- [address] [place] [address] [phone]    [facility]  Behavior issues or concerns -- [address] [place] [address] [phone]                  Continued Care and Services - Linked Episodes Includes continued care and service providers from the active episodes linked to this encounter, which are listed below      Care Transitions Episode start date: 4/3/2025   There are no active outsourced providers for this episode.                   Prior To Hospitalization:    Living Situation: Roommate (Ex , Cb) and residing at Apartment    .  Support Systems: Family members, Friends   Home Devices/Equipment: Mobility assist device, Shower chair, Blood glucose monitor            Mobility Assist Devices: None   Type of Service Prior to Hospitalization: PT, OT, Nurse (specify)               Patient/Family discharge goal (s):  Home, Home Care, Home therapy     Resources provided:           Prior Function  Bed Mobility: Modified Independent (04/05/25 1124 : Oneida Solis, PT)  Transfers: Modified Independent (04/05/25 1124 : Oneida Solis, PT)  Ambulation in the Home: Modified  Independent (04/05/25 1124 : Oneida Solis, PT)  Ambulation in the Community: Modified Independent (04/05/25 1124 : Oneida Solis, PT)    Current Function  Last Filed Values         Value Time User    Current Function  slightly below baseline level of function 4/16/2025  1:01 PM Cherelle Wood, ROSS    Therapy Impairments  activity tolerance; strength; safety awareness 4/16/2025  1:01 PM Cherelle Wood, ROSS    ADLs Requiring Support  bed mobility; transfers; ambulation; stairs 4/16/2025  1:01 PM Cherelle Wood, PTA            Therapy  Recommendations for Discharge:   PT:      Last Filed Values         Value Time User    PT Discharge Needs  therapy 5 or more times per week 4/16/2025  1:01 PM Cherelle Wood PTA          OT:       Last Filed Values         Value Time User    OT Discharge Needs  therapy 5 or more times per week 4/16/2025  4:08 PM Bhakti France OTA          SLP:    Last Filed Values       None            Mobility Equipment Recommended for Discharge: Pt has 2ww      Barriers to Discharge  Identified Barriers to Discharge/Transition Planning: Medical necessity for acute care

## 2025-05-13 ENCOUNTER — OFFICE VISIT (OUTPATIENT)
Dept: PAIN MEDICINE | Facility: CLINIC | Age: 75
End: 2025-05-13
Payer: MEDICARE

## 2025-05-13 VITALS — DIASTOLIC BLOOD PRESSURE: 82 MMHG | HEART RATE: 96 BPM | SYSTOLIC BLOOD PRESSURE: 175 MMHG | RESPIRATION RATE: 20 BRPM

## 2025-05-13 DIAGNOSIS — G89.29 CHRONIC PAIN OF RIGHT KNEE: ICD-10-CM

## 2025-05-13 DIAGNOSIS — M96.1 CERVICAL POST-LAMINECTOMY SYNDROME: ICD-10-CM

## 2025-05-13 DIAGNOSIS — G25.81 RESTLESS LEGS SYNDROME: ICD-10-CM

## 2025-05-13 DIAGNOSIS — M25.561 CHRONIC PAIN OF RIGHT KNEE: ICD-10-CM

## 2025-05-13 DIAGNOSIS — M51.371 DEGENERATION OF INTERVERTEBRAL DISC OF LUMBOSACRAL REGION WITH LOWER EXTREMITY PAIN: ICD-10-CM

## 2025-05-13 PROCEDURE — G2211 COMPLEX E/M VISIT ADD ON: HCPCS | Performed by: ANESTHESIOLOGY

## 2025-05-13 PROCEDURE — 99214 OFFICE O/P EST MOD 30 MIN: CPT | Performed by: ANESTHESIOLOGY

## 2025-05-13 PROCEDURE — 3077F SYST BP >= 140 MM HG: CPT | Performed by: ANESTHESIOLOGY

## 2025-05-13 PROCEDURE — 3079F DIAST BP 80-89 MM HG: CPT | Performed by: ANESTHESIOLOGY

## 2025-05-13 RX ORDER — OXYCODONE AND ACETAMINOPHEN 10; 325 MG/1; MG/1
1 TABLET ORAL 3 TIMES DAILY PRN
Qty: 84 TABLET | Refills: 0 | Status: SHIPPED | OUTPATIENT
Start: 2025-06-16 | End: 2025-07-14

## 2025-05-13 RX ORDER — METHOCARBAMOL 750 MG/1
750 TABLET, FILM COATED ORAL 3 TIMES DAILY PRN
Qty: 90 TABLET | Refills: 2 | Status: SHIPPED | OUTPATIENT
Start: 2025-05-13

## 2025-05-13 RX ORDER — ROPINIROLE 1 MG/1
1 TABLET, FILM COATED ORAL 3 TIMES DAILY
Qty: 90 TABLET | Refills: 2 | Status: SHIPPED | OUTPATIENT
Start: 2025-05-13 | End: 2025-08-11

## 2025-05-13 RX ORDER — OXYCODONE AND ACETAMINOPHEN 10; 325 MG/1; MG/1
1 TABLET ORAL 3 TIMES DAILY PRN
Qty: 84 TABLET | Refills: 0 | Status: SHIPPED | OUTPATIENT
Start: 2025-05-19 | End: 2025-06-16

## 2025-05-13 RX ORDER — OXYCODONE AND ACETAMINOPHEN 10; 325 MG/1; MG/1
1 TABLET ORAL 3 TIMES DAILY PRN
Qty: 84 TABLET | Refills: 0 | Status: SHIPPED | OUTPATIENT
Start: 2025-07-14

## 2025-05-13 ASSESSMENT — PAIN SCALES - GENERAL: PAINLEVEL_OUTOF10: 10 - WORST POSSIBLE PAIN

## 2025-05-13 ASSESSMENT — PAIN DESCRIPTION - DESCRIPTORS: DESCRIPTORS: RADIATING

## 2025-05-13 ASSESSMENT — PAIN - FUNCTIONAL ASSESSMENT: PAIN_FUNCTIONAL_ASSESSMENT: 0-10

## 2025-05-13 NOTE — PROGRESS NOTES
Subjective   Patient ID: Phuc Cheek is a 74 y.o. male with a past medical history of low back pain radiating to the posterior aspect of his left buttock.      Patient states pain is localized to the left side, will radiate into buttock and downhis lateral leg to the knee. Sitting makes it worse and walking and standing do not cause discomfort.patient unable togo on long car rides due to the pain.     He received a left biased L4-5 epidural steroid injection on 1/30/25. Patient states did not help with his pain but made it worse. Pain down his leg that was not relieved afterwards.     Previous injection he has had injection and pain was relieved for about 6 months.     His pain has been managed with Robaxin 750mg TID, percocet 10mg TID, and ropinirole 1mg TID. His medications typically relieve his pain 90%, but have not been effective in treating his new onset pain. He is able to independently manage his activities of daily living and has an average overall quality of family/social life with his current treatment. His controlled substance agreement will be updated today. Urine drug screen is up to date.       Review of Systems   13-point ROS done and negative except for HPI.     Current Outpatient Medications   Medication Instructions    acetaminophen (TYLENOL) 1,000 mg, Every 6 hours PRN    atenolol (TENORMIN) 25 mg, oral, Daily    cetirizine (ZYRTEC) 10 mg, Daily    cholecalciferol (Vitamin D-3) 50 MCG (2000 UT) tablet 1 each orally once a day    clopidogrel (PLAVIX) 75 mg, oral, Daily    cyanocobalamin (Vitamin B-12) 500 mcg tablet 1 tablet, Daily    diazePAM (Valium) 5 mg tablet TAKE 1-2 TABS PO ONE HOUR PRIOR TO MRI. MAY REPEAT UPON ARRIVAL TO MRI IF NEEDED (must have a )    EPINEPHrine 0.3 mg/0.3 mL injection syringe 1 Syringe, Once as needed    ezetimibe (ZETIA) 10 mg, oral, Daily    famotidine (PEPCID) 20 mg, Daily    folic acid (Folvite) 800 mcg tablet 1 tablet, Daily    furosemide (Lasix) 20 mg  tablet TAKE 1 TABLET BY MOUTH THREE TIMES A WEEK    gabapentin (Neurontin) 300 mg capsule Take 1 cap PO for 1 week at bedtime, then increase to 2 caps PO for 1 week at bedtime, then increase to 3 caps PO for 1 week at bedtime and continue this dose then call the office for refills.    ketoconazole (NIZOral) 2 % cream APPLY CREAM TOPICALLY TO AFFECTED AREA ON TOENAILS ONCE DAILY    lisinopril 10 mg, oral, Daily, for blood pressure    methocarbamol (ROBAXIN) 750 mg, oral, 3 times daily PRN    naloxone (NARCAN) 4 mg, nasal, As needed, May repeat every 2-3 minutes if needed, alternating nostrils, until medical assistance becomes available.    oxyCODONE-acetaminophen (Percocet)  mg tablet 1 tablet, oral, 3 times daily PRN    oxyCODONE-acetaminophen (Percocet)  mg tablet 1 tablet, oral, 3 times daily PRN    oxyCODONE-acetaminophen (Percocet)  mg tablet 1 tablet, oral, 3 times daily PRN    oxyCODONE-acetaminophen (Percocet)  mg tablet 1 tablet, oral, 3 times daily PRN    potassium chloride CR (Klor-Con) 10 mEq ER tablet Take 2 tablets by mouth 3 x weekly    pravastatin (Pravachol) 10 mg tablet TAKE 1 TABLET BY MOUTH THREE TIMES A WEEK    rOPINIRole (REQUIP) 1 mg, oral, 3 times daily    zinc gluconate 50 mg tablet 50 mg of elemental zinc, 3 times weekly       Medical History[1]     Surgical History[2]     Family History[3]     RX Allergies[4]     Objective     Vitals:    05/13/25 1054   BP: 175/82   Pulse: 96   Resp: 20        Physical Exam  General: NAD, well groomed, well nourished  Eyes: Non-icteric sclera, EOMI  Ears, Nose, Mouth, and Throat: External ears and nose appear to be without deformity or rash. No lesions or masses noted. Hearing is grossly intact.   Neck: Trachea midline  Respiratory: Nonlabored breathing   Cardiovascular: no peripheral edema   Skin: No rashes or open lesions/ulcers identified on skin.    Back:   Palpation: No tenderness to palpation over lumbar paraspinous muscles.    Pain to palpation over left buttock   Straight leg raise: does not reproduce their pain, bilaterally   ROMI Maneuver does not reproduce pain     Hip: No pain over greater trochanters. and Pain not reproduced with hip internal/external rotation.     Neurologic:   Cranial nerves grossly intact.   Strength 5/5 and symmetric plantar/dorsiflexion   Sensation: Normal to light touch throughout, pinprick  intact throughout.  DTRs:normal and symmetric throughout  Mosley: absent  Clonus: absent      Psychiatric: Alert, orientation to person, place, and time. Cooperative.    Imaging personally reviewed and independently interpreted:     MRI Lumbar Spine 3/07/25  FINDINGS:  Mild lower lumbar levoscoliosis.. Slight right lateral translation L3  on L4. Slight left lateral translation L4 on L5.. Mild degree  spondylolisthesis at multiple levels.      No compression deformity or destructive osseous process.      Discs are desiccated throughout the lumbar spine. Moderately severe  asymmetric loss of disc height L4-5. Moderate asymmetric loss of disc  height L3-4. Mild Modic type 1 endplate degenerative change L2-3.  Asymmetric Modic type 1 endplate degenerative changes L3-4. Mixed  Modic type 1 and type 2 endplate degenerative change L4-5.      Normal conus termination.      T2 hyperintense focus in the right iliac bone is unchanged from prior  study and likely nonaggressive.      LEVELS:  L5-S1: Disc bulge lateralized slightly more right than left. Moderate  facet arthrosis left worse than right. No significant central canal  stenosis. Mild-to-moderate right and moderate left foraminal  stenosis. L4-L5: Disc bulge lateralized to both sides with endplate  spurring. Moderate facet arthrosis. Mild ligamentous thickening. Mild  central canal stenosis. Moderate narrowing of the bilateral lateral  recesses at the superior endplate level. Moderately severe right and  moderate left foraminal stenosis. L3-L4: Disc bulge lateralized  to  both sides with mild endplate spurring along the left.  Mild-to-moderate facet arthrosis. Mild central canal stenosis.  Moderate narrowing of the right lateral recess at the superior  endplate level. Mild narrowing of the left lateral recess at the  upper disc level. Moderate bilateral foraminal stenosis left worse  than right. L2-L3: Disc bulge lateralized slightly more left than  right. Mild-to-moderate facet arthrosis. Mild central canal stenosis.  Mild-to-moderate bilateral foraminal stenosis. L1-L2: Mild  degenerative changes. No significant stenosis. T12-L1: Mild  degenerative changes. No significant stenosis.          IMPRESSION:  Multilevel degenerative lumbar spondylosis as described above. There  are no significant interval changes from the prior MRI.      Mild lower lumbar levoscoliosis.    Assessment/Plan   Phuc Cheek is a 74 y.o. male with history of low back pain s/p recent L4-5 TIFFANIE on 1/30/25 which patient states did not help. Signs and symptoms are consistent with lumbar radiculopathy secondary to foraminal stens[place]. Patient has moderate foraminal stenosis in L2-L3, L3-L4.     Plan:  - EMG of left leg   - bilateral knee x-ray  - Water based physical therapy   - Continue percocet 10mg TID as needed. Controlled substance agreement updated in office today. Urine drug screen up to date.   - Continue Robaxin 750mg TID and ropinirole 1mg TID.     The patient has failed treatment with : Physical therapy , three or more classes of medications, alternative therapies, injections, and the procedure is medically indicated    We discussed  the risks, benefits and alternatives of the procedure including but not limited to: , Lack of efficacy , Transiently worsening pain , Bleeding, Infection , and Nerve Damage    The patient was invited to contact us back anytime with any questions or concerns and follow-up with us in the office as needed.     Diagnoses and all orders for this visit:  Cervical  post-laminectomy syndrome  Restless legs syndrome    The patient was discussed and seen with Dr. Kuhn.  Guero Benavides MD           [1]   Past Medical History:  Diagnosis Date    Acute bronchitis 02/16/2024    Arthritis Unknown    Basal cell carcinoma of skin, unspecified     Skin cancer, basal cell    Cellulitis of lower extremity 02/16/2024    CHF (congestive heart failure) 2014    Disease due to severe acute respiratory syndrome coronavirus 2 (SARS-CoV-2) 04/11/2023    Diverticulosis of intestine, part unspecified, without perforation or abscess without bleeding     Diverticulosis    Eczema 04/11/2023    Heart disease 1999    Hematuria 02/16/2024    HLD (hyperlipidemia) 04/11/2023    Hypertension 1999    Myocardial infarction (Multi) 11/5/2014    Noninfective gastroenteritis and colitis, unspecified     Colitis    Other abnormal glucose     Hemoglobin A1c less than 7.0%    Personal history of (healed) traumatic fracture     History of fracture of upper extremity    Personal history of other diseases of the circulatory system     History of arteriosclerotic cardiovascular disease    Personal history of other diseases of the digestive system     History of anal fissures    Personal history of other diseases of the digestive system     History of esophageal reflux    Personal history of other diseases of the digestive system     History of hemorrhoids    Personal history of other diseases of the musculoskeletal system and connective tissue     History of osteoarthritis    Personal history of other diseases of the nervous system and sense organs     History of sleep apnea    Personal history of other diseases of the respiratory system     History of sinusitis    Personal history of other medical treatment     History of stress test    Personal history of other specified conditions     History of headache    Personal history of urinary calculi     History of renal calculi    Restless legs syndrome     RLS (restless  legs syndrome)    Right lower quadrant abdominal pain 02/24/2023    Scalp hematoma 02/16/2024    Sore throat 04/11/2023    Thrush, oral 04/11/2023    Umbilical hernia without obstruction or gangrene     Umbilical hernia    Unspecified atherosclerosis     Blocked artery    Varicella unknown   [2]   Past Surgical History:  Procedure Laterality Date    APPENDECTOMY  02/17/2017    Appendectomy    CARDIAC CATHETERIZATION  1999,2000    CERVICAL FUSION  07/22/2016    Cervical Vertebral Fusion    COLONOSCOPY  02/17/2017    Colonoscopy    CORONARY ARTERY BYPASS GRAFT  07/22/2016    CABG    CORONARY STENT PLACEMENT  1999,2000,2014    ESOPHAGOGASTRODUODENOSCOPY  02/17/2017    Diagnostic Esophagogastroduodenoscopy    HAND TENDON SURGERY  07/22/2016    Hand Incision Tendon Sheath Of A Finger    HEMORRHOID SURGERY  02/17/2017    Hemorrhoidectomy    HERNIA REPAIR  02/17/2017    Inguinal Hernia Repair    JOINT REPLACEMENT  4/29/2014 &3/14/2022 knees    KNEE SURGERY  07/22/2016    Knee Surgery    MR HEAD ANGIO WO IV CONTRAST  05/28/2016    MR HEAD ANGIO WO IV CONTRAST 5/28/2016 [facility]    MR HEAD ANGIO WO IV CONTRAST  05/03/2018    MR HEAD ANGIO WO IV CONTRAST 5/3/2018 [facility]    MR HEAD ANGIO WO IV CONTRAST  05/03/2018    MR HEAD ANGIO WO IV CONTRAST 5/3/2018 [facility]    MR NECK ANGIO WO IV CONTRAST  05/28/2016    MR NECK ANGIO WO IV CONTRAST 5/28/2016 [facility]ICAL [facility]GAC[facility]    MR NECK ANGIO WO IV CONTRAST  05/03/2018    MR NECK ANGIO WO IV CONTRAST 5/3/2018 [facility]    OTHER SURGICAL HISTORY  07/22/2016    Biopsy Skin    OTHER SURGICAL HISTORY  10/08/2021    Heart surgery    OTHER SURGICAL HISTORY  02/17/2017    Excision Of Leg Soft Tissue Tumor    OTHER SURGICAL HISTORY  02/17/2017    Nerve Block Cervical Plexus    OTHER SURGICAL HISTORY  02/17/2017    Nerve Block Lumbar Plexus    OTHER SURGICAL HISTORY  02/17/2017    Acupuncture One Or More [place]    OTHER SURGICAL HISTORY   02/17/2017    Angioplasty    OTHER SURGICAL HISTORY  02/17/2017    Arterial Catheterization    OTHER SURGICAL HISTORY  02/17/2017    Cystoscopy With Biopsy    SINUS SURGERY  10/29/2014    SPINE SURGERY  1993 & 2018 neck fusion    SPLENECTOMY, TOTAL  02/17/2017    Splenectomy    TONSILLECTOMY  07/22/2016    Tonsillectomy   [3]   Family History  Problem Relation Name Age of Onset    Cancer Mother MOM         breast    Heart disease Mother MOM     Breast cancer Mother MOM     Arthritis Mother MOM     Hyperlipidemia Mother MOM     Hypertension Mother MOM     Heart attack Mother MOM     Arthritis Father DAD     Other (bladder cancer) Father DAD     Diabetes Father DAD     Heart disease Father DAD     Other (cabg) Father DAD     Hypertension Father DAD     Heart attack Father DAD     Skin cancer Father DAD     Diabetes Sister Sister     Arthritis Sister Sister     Goiter Sister Sister     Hyperlipidemia Sister Sister     Heart disease Sister Sister only     Accidental death Brother Brother     Arthritis Brother Brother     Hyperlipidemia Brother Brother     Heart disease Brother Brother only    [4]   Allergies  Allergen Reactions    Adhesive Unknown    Bee Venom Protein (Honey Bee) Hives and Swelling    Niacin Other    Simvastatin Myalgia    Tetracyclines Unknown     Nausea and GI upset    Adhesive Tape-Silicones Unknown and Rash     Adhesive Tape TAPE

## 2025-05-14 ENCOUNTER — HOSPITAL ENCOUNTER (OUTPATIENT)
Dept: RADIOLOGY | Facility: HOSPITAL | Age: 75
Discharge: HOME | End: 2025-05-14
Payer: MEDICARE

## 2025-05-14 PROCEDURE — 73564 X-RAY EXAM KNEE 4 OR MORE: CPT | Mod: 50

## 2025-08-07 PROBLEM — J10.1 INFLUENZA A: Status: RESOLVED | Noted: 2025-02-05 | Resolved: 2025-08-07

## 2025-08-07 RX ORDER — PANTOPRAZOLE SODIUM 20 MG/1
1 TABLET, DELAYED RELEASE ORAL
COMMUNITY
Start: 2025-04-30

## 2025-08-07 RX ORDER — PENICILLIN V POTASSIUM 500 MG/1
TABLET, FILM COATED ORAL
COMMUNITY
Start: 2025-04-23

## 2025-08-11 ENCOUNTER — APPOINTMENT (OUTPATIENT)
Dept: PRIMARY CARE | Facility: CLINIC | Age: 75
End: 2025-08-11
Payer: MEDICARE

## 2025-08-11 ENCOUNTER — OFFICE VISIT (OUTPATIENT)
Dept: PAIN MEDICINE | Facility: CLINIC | Age: 75
End: 2025-08-11
Payer: MEDICARE

## 2025-08-11 VITALS
HEART RATE: 57 BPM | HEIGHT: 70 IN | SYSTOLIC BLOOD PRESSURE: 109 MMHG | BODY MASS INDEX: 34.79 KG/M2 | DIASTOLIC BLOOD PRESSURE: 69 MMHG | OXYGEN SATURATION: 96 % | WEIGHT: 243 LBS | TEMPERATURE: 98.1 F

## 2025-08-11 VITALS
DIASTOLIC BLOOD PRESSURE: 72 MMHG | OXYGEN SATURATION: 98 % | RESPIRATION RATE: 18 BRPM | HEART RATE: 78 BPM | SYSTOLIC BLOOD PRESSURE: 126 MMHG

## 2025-08-11 DIAGNOSIS — G25.81 RESTLESS LEGS SYNDROME: ICD-10-CM

## 2025-08-11 DIAGNOSIS — M47.816 LUMBAR FACET ARTHROPATHY: ICD-10-CM

## 2025-08-11 DIAGNOSIS — M96.1 CERVICAL POST-LAMINECTOMY SYNDROME: ICD-10-CM

## 2025-08-11 DIAGNOSIS — M70.61 TROCHANTERIC BURSITIS OF RIGHT HIP: Primary | ICD-10-CM

## 2025-08-11 DIAGNOSIS — I10 PRIMARY HYPERTENSION: ICD-10-CM

## 2025-08-11 DIAGNOSIS — J01.90 ACUTE NON-RECURRENT SINUSITIS, UNSPECIFIED LOCATION: Primary | ICD-10-CM

## 2025-08-11 DIAGNOSIS — M47.812 SPONDYLOSIS OF CERVICAL REGION WITHOUT MYELOPATHY OR RADICULOPATHY: ICD-10-CM

## 2025-08-11 LAB — POC SARS-COV-2 AG BINAX: ABNORMAL

## 2025-08-11 PROCEDURE — 1159F MED LIST DOCD IN RCRD: CPT | Performed by: ANESTHESIOLOGY

## 2025-08-11 PROCEDURE — 99212 OFFICE O/P EST SF 10 MIN: CPT | Mod: 25 | Performed by: ANESTHESIOLOGY

## 2025-08-11 PROCEDURE — 1159F MED LIST DOCD IN RCRD: CPT | Performed by: FAMILY MEDICINE

## 2025-08-11 PROCEDURE — 3078F DIAST BP <80 MM HG: CPT | Performed by: FAMILY MEDICINE

## 2025-08-11 PROCEDURE — 87811 SARS-COV-2 COVID19 W/OPTIC: CPT | Performed by: FAMILY MEDICINE

## 2025-08-11 PROCEDURE — 3074F SYST BP LT 130 MM HG: CPT | Performed by: FAMILY MEDICINE

## 2025-08-11 PROCEDURE — 3074F SYST BP LT 130 MM HG: CPT | Performed by: ANESTHESIOLOGY

## 2025-08-11 PROCEDURE — 3078F DIAST BP <80 MM HG: CPT | Performed by: ANESTHESIOLOGY

## 2025-08-11 PROCEDURE — 20610 DRAIN/INJ JOINT/BURSA W/O US: CPT | Performed by: ANESTHESIOLOGY

## 2025-08-11 PROCEDURE — 99214 OFFICE O/P EST MOD 30 MIN: CPT | Performed by: FAMILY MEDICINE

## 2025-08-11 PROCEDURE — 1036F TOBACCO NON-USER: CPT | Performed by: ANESTHESIOLOGY

## 2025-08-11 PROCEDURE — 2500000004 HC RX 250 GENERAL PHARMACY W/ HCPCS (ALT 636 FOR OP/ED): Performed by: ANESTHESIOLOGY

## 2025-08-11 PROCEDURE — 1036F TOBACCO NON-USER: CPT | Performed by: FAMILY MEDICINE

## 2025-08-11 RX ORDER — ROPINIROLE 1 MG/1
1 TABLET, FILM COATED ORAL 3 TIMES DAILY
Qty: 90 TABLET | Refills: 2 | Status: SHIPPED | OUTPATIENT
Start: 2025-08-11 | End: 2025-11-09

## 2025-08-11 RX ORDER — BUPIVACAINE HYDROCHLORIDE 5 MG/ML
4 INJECTION, SOLUTION EPIDURAL; INTRACAUDAL; PERINEURAL ONCE
Status: COMPLETED | OUTPATIENT
Start: 2025-08-11 | End: 2025-08-12

## 2025-08-11 RX ORDER — OXYCODONE AND ACETAMINOPHEN 10; 325 MG/1; MG/1
1 TABLET ORAL 3 TIMES DAILY PRN
Qty: 84 TABLET | Refills: 0 | Status: SHIPPED | OUTPATIENT
Start: 2025-09-08 | End: 2025-10-06

## 2025-08-11 RX ORDER — AMOXICILLIN AND CLAVULANATE POTASSIUM 875; 125 MG/1; MG/1
875 TABLET, FILM COATED ORAL 2 TIMES DAILY
Qty: 20 TABLET | Refills: 0 | Status: SHIPPED | OUTPATIENT
Start: 2025-08-11 | End: 2025-08-21

## 2025-08-11 RX ORDER — METHOCARBAMOL 750 MG/1
750 TABLET, FILM COATED ORAL 3 TIMES DAILY PRN
Qty: 90 TABLET | Refills: 2 | Status: SHIPPED | OUTPATIENT
Start: 2025-08-11

## 2025-08-11 RX ORDER — OXYCODONE AND ACETAMINOPHEN 10; 325 MG/1; MG/1
1 TABLET ORAL 3 TIMES DAILY PRN
Qty: 84 TABLET | Refills: 0 | Status: SHIPPED | OUTPATIENT
Start: 2025-10-06 | End: 2025-11-03

## 2025-08-11 RX ORDER — OXYCODONE AND ACETAMINOPHEN 10; 325 MG/1; MG/1
1 TABLET ORAL 3 TIMES DAILY PRN
Qty: 84 TABLET | Refills: 0 | Status: SHIPPED | OUTPATIENT
Start: 2025-08-11

## 2025-08-11 RX ORDER — TRIAMCINOLONE ACETONIDE 40 MG/ML
40 INJECTION, SUSPENSION INTRA-ARTICULAR; INTRAMUSCULAR ONCE
Status: COMPLETED | OUTPATIENT
Start: 2025-08-11 | End: 2025-08-12

## 2025-08-11 RX ORDER — IPRATROPIUM BROMIDE 42 UG/1
2 SPRAY, METERED NASAL 4 TIMES DAILY PRN
Qty: 15 ML | Refills: 2 | Status: SHIPPED | OUTPATIENT
Start: 2025-08-11

## 2025-08-11 ASSESSMENT — ENCOUNTER SYMPTOMS
SHORTNESS OF BREATH: 0
DIAPHORESIS: 0
SORE THROAT: 1
SINUS PAIN: 0
CHEST TIGHTNESS: 0
FEVER: 0
DIARRHEA: 0
VOMITING: 0
TROUBLE SWALLOWING: 0
COUGH: 1
RHINORRHEA: 1
NAUSEA: 0
CHILLS: 0
SINUS PRESSURE: 0

## 2025-08-11 ASSESSMENT — PAIN - FUNCTIONAL ASSESSMENT: PAIN_FUNCTIONAL_ASSESSMENT: 0-10

## 2025-08-11 ASSESSMENT — PAIN DESCRIPTION - DESCRIPTORS: DESCRIPTORS: ACHING

## 2025-08-12 RX ADMIN — BUPIVACAINE HYDROCHLORIDE 20 MG: 5 INJECTION, SOLUTION EPIDURAL; INTRACAUDAL; PERINEURAL at 07:39

## 2025-08-12 RX ADMIN — TRIAMCINOLONE ACETONIDE 40 MG: 40 INJECTION, SUSPENSION INTRA-ARTICULAR; INTRAMUSCULAR at 07:39

## 2025-09-12 ENCOUNTER — APPOINTMENT (OUTPATIENT)
Dept: PRIMARY CARE | Facility: CLINIC | Age: 75
End: 2025-09-12
Payer: MEDICARE

## 2026-02-13 ENCOUNTER — APPOINTMENT (OUTPATIENT)
Dept: PRIMARY CARE | Facility: CLINIC | Age: 76
End: 2026-02-13
Payer: MEDICARE